# Patient Record
Sex: FEMALE | Race: BLACK OR AFRICAN AMERICAN | NOT HISPANIC OR LATINO | Employment: FULL TIME | URBAN - METROPOLITAN AREA
[De-identification: names, ages, dates, MRNs, and addresses within clinical notes are randomized per-mention and may not be internally consistent; named-entity substitution may affect disease eponyms.]

---

## 2017-03-01 ENCOUNTER — OFFICE VISIT (OUTPATIENT)
Dept: FAMILY MEDICINE CLINIC | Facility: CLINIC | Age: 47
End: 2017-03-01

## 2017-03-01 VITALS
HEIGHT: 68 IN | WEIGHT: 293 LBS | DIASTOLIC BLOOD PRESSURE: 92 MMHG | SYSTOLIC BLOOD PRESSURE: 130 MMHG | OXYGEN SATURATION: 96 % | HEART RATE: 93 BPM | BODY MASS INDEX: 44.41 KG/M2 | TEMPERATURE: 97.9 F

## 2017-03-01 DIAGNOSIS — E66.01 MORBID OBESITY DUE TO EXCESS CALORIES (HCC): ICD-10-CM

## 2017-03-01 DIAGNOSIS — I10 ESSENTIAL HYPERTENSION: Primary | ICD-10-CM

## 2017-03-01 DIAGNOSIS — R60.0 LOCALIZED EDEMA: ICD-10-CM

## 2017-03-01 DIAGNOSIS — N93.9 ABNORMAL UTERINE AND VAGINAL BLEEDING, UNSPECIFIED: ICD-10-CM

## 2017-03-01 PROCEDURE — 99213 OFFICE O/P EST LOW 20 MIN: CPT | Performed by: FAMILY MEDICINE

## 2017-03-01 RX ORDER — LOSARTAN POTASSIUM 50 MG/1
50 TABLET ORAL DAILY
Qty: 90 TABLET | Refills: 3 | Status: SHIPPED | OUTPATIENT
Start: 2017-03-01 | End: 2018-02-09 | Stop reason: SDUPTHER

## 2017-03-01 NOTE — PROGRESS NOTES
"Subjective   Davina Delgado is a 46 y.o. female.  Pt is here due to swelling in ankles. Swelling is present 3-4 weeks. Pt is waking up in middle of the night along with through the day with swelling ankles.     History of Present Illness aye ankle swelling 3-4 weeks.  Painful due to pressure in ankles.  No injury.  She is on hctz and norvasc 10 mg for over last 1 year. No cp no soa.  nno pnd.  No orthopnea onset after started calcium channel blocker.  Bilateral  No redness.      She has hx fibroids and having spotting intermenstral with some bleeding 2 weeks after period for 2-3 days.      The following portions of the patient's history were reviewed and updated as appropriate: allergies, current medications, past family history, past medical history, past social history, past surgical history and problem list.    Review of Systems   Constitutional: Negative.    HENT: Negative.    Eyes: Negative.    Respiratory: Negative.  Negative for apnea, cough, choking, chest tightness and shortness of breath.    Cardiovascular: Positive for leg swelling. Negative for chest pain and palpitations.   Gastrointestinal: Negative.    Endocrine: Negative.    Genitourinary: Negative.    Musculoskeletal: Negative.    Skin: Negative.    Allergic/Immunologic: Negative.    Neurological: Negative.    Hematological: Negative.    Psychiatric/Behavioral: Negative.    All other systems reviewed and are negative.      Objective     Vitals:    03/01/17 1053   BP: 130/92   Pulse: 93   Temp: 97.9 °F (36.6 °C)   SpO2: 96%   Weight: (!) 340 lb (154 kg)   Height: 68\" (172.7 cm)       Physical Exam   Constitutional: She is oriented to person, place, and time. She appears well-developed and well-nourished.   HENT:   Head: Normocephalic and atraumatic.   Eyes: EOM are normal. Pupils are equal, round, and reactive to light. Right eye exhibits no discharge. Left eye exhibits no discharge.   Neck: Normal range of motion. Neck supple.   Cardiovascular: " Normal rate, regular rhythm, normal heart sounds and intact distal pulses.    Trace 1+ pitting edema bilaterally   Pulmonary/Chest: Effort normal and breath sounds normal.   Abdominal: Soft. Bowel sounds are normal. She exhibits no mass. There is no tenderness.   Musculoskeletal: Normal range of motion.        Right shoulder: She exhibits no swelling.   Neurological: She is alert and oriented to person, place, and time. She has normal reflexes.   Skin: Skin is warm and dry. No cyanosis. Nails show no clubbing.   Psychiatric: She has a normal mood and affect. Her behavior is normal. Judgment and thought content normal.   Nursing note and vitals reviewed.      Assessment/Plan     Problem List Items Addressed This Visit        Cardiovascular and Mediastinum    Hypertension - Primary    Relevant Medications    losartan (COZAAR) 50 MG tablet       Digestive    Obesity       Other    Abnormal uterine and vaginal bleeding, unspecified    Relevant Orders    Ambulatory Referral to Gynecology    Localized edema    Relevant Medications    losartan (COZAAR) 50 MG tablet        We will discontinue Norvasc as this is likely what is causing her peripheral edema.  Continue hydrochlorothiazide.  Start her on losartan 50 mg daily recheck her blood pressure in 1-2 months.  We will refer her to gynecology for her abnormal vaginal bleeding.

## 2017-03-30 ENCOUNTER — OFFICE VISIT (OUTPATIENT)
Dept: OBSTETRICS AND GYNECOLOGY | Facility: CLINIC | Age: 47
End: 2017-03-30

## 2017-03-30 VITALS
SYSTOLIC BLOOD PRESSURE: 140 MMHG | WEIGHT: 293 LBS | HEIGHT: 68 IN | BODY MASS INDEX: 44.41 KG/M2 | DIASTOLIC BLOOD PRESSURE: 90 MMHG

## 2017-03-30 DIAGNOSIS — N93.9 ABNORMAL UTERINE AND VAGINAL BLEEDING, UNSPECIFIED: Primary | ICD-10-CM

## 2017-03-30 DIAGNOSIS — N92.1 MENORRHAGIA WITH IRREGULAR CYCLE: ICD-10-CM

## 2017-03-30 DIAGNOSIS — D25.1 INTRAMURAL LEIOMYOMA OF UTERUS: ICD-10-CM

## 2017-03-30 PROCEDURE — 99203 OFFICE O/P NEW LOW 30 MIN: CPT | Performed by: OBSTETRICS & GYNECOLOGY

## 2017-03-30 RX ORDER — HYDROCHLOROTHIAZIDE 12.5 MG/1
CAPSULE, GELATIN COATED ORAL
Refills: 3 | COMMUNITY
Start: 2017-03-06 | End: 2019-06-17 | Stop reason: SDUPTHER

## 2017-03-30 NOTE — PROGRESS NOTES
Subjective   Chief Complaint   Patient presents with   • Menstrual Problem     Pt. is here because she is having irregular periods.  She complains of having really heavy periods.      Davina Delgado is a 46 y.o. year old .  Patient's last menstrual period was 03/10/2017 (approximate).  She presents to be seen because of abnormal bleeding and heavy periods.     SEXUAL Hx:  She is not currently sexually active.  In the past year there has not been new sexual partners.    Condoms are not typically used.  She would not like to be screened for STD's at today's exam.  Current birth control method: tubal ligation.  She is happy with her current method of contraception and does not want to discuss alternative methods of contraception.  MENSTRUAL Hx:  Patient's last menstrual period was 03/10/2017 (approximate).  In the past 6 months her cycles have been irregular.  Her menstrual flow is excessive.   Each month on average there are roughly 2 day(s) of very heavy flow.    Intermenstrual bleeding is present.    Post-coital bleeding is absent.  Dysmenorrhea: is not affecting her activities of daily living  PMS: is not affecting her activities of daily living  Her cycles are a source of concern for her that she wishes to discuss today.  HEALTH Hx:  She exercises regularly:no (but is planning to start exercising more ).  She wears her seat belt:yes.  She has concerns about domestic violence: no.  OTHER COMPLAINTS:  Nothing else    OTHER THINGS SHE WANTS TO DISCUSS TODAY:  Nothing else    The following portions of the patient's history were reviewed and updated as appropriate:current medications, allergies, past medical history and past surgical history    Smoking status: Never Smoker                                                                 Smokeless status: Not on file                       Review of Systems  Review of Systems - History obtained from the patient  General ROS: positive for  - fatigue and weight  "gain  Psychological ROS: negative  ENT ROS: negative  Allergy and Immunology ROS: negative  Hematological and Lymphatic ROS: negative  Endocrine ROS: negative  Breast ROS: negative for breast lumps  Mammogram is current  Respiratory ROS: no cough, shortness of breath, or wheezing  Cardiovascular ROS: no chest pain or dyspnea on exertion  Gastrointestinal ROS: no abdominal pain, change in bowel habits, or black or bloody stools  Genito-Urinary ROS: no dysuria, trouble voiding, or hematuria  Musculoskeletal ROS: negative  Neurological ROS: no TIA or stroke symptoms  Dermatological ROS: negative        Objective   /90 (BP Location: Right arm, Patient Position: Sitting, Cuff Size: Large Adult)  Ht 68\" (172.7 cm)  Wt (!) 337 lb (153 kg)  LMP 03/10/2017 (Approximate)  Breastfeeding? No  BMI 51.24 kg/m2    General:  well developed; well nourished  no acute distress  obese - Body mass index is 51.24 kg/(m^2).   Skin:  No suspicious lesions seen   Thyroid: normal to inspection and palpation   Lungs:  breathing is unlabored  clear to auscultation bilaterally   Heart:  regular rate and rhythm, S1, S2 normal, no murmur, click, rub or gallop   Breasts:  Examined in supine position  Symmetric without masses or skin dimpling  Nipples normal without inversion, lesions or discharge  There are no palpable axillary nodes   Abdomen: soft, non-tender; no masses  no umbilical or inginual hernias are present  no hepato-splenomegaly   Pelvis: Clinical staff was present for exam  External genitalia:  normal appearance of the external genitalia including [place]th[place]'s and [place]'s glands.  :  urethral meatus normal; urethral hypermobility is absent.  Vaginal:  normal pink mucosa without prolapse or lesions.  Cervix:  normal appearance.  Uterus:  normal size, shape and consistency. anteverted;  Adnexa:  normal bimanual exam of the adnexa.  Rectal:  digital rectal exam not performed; anus visually normal appearing.     Lab Review "   No data reviewed    Imaging   No data reviewed          Assessment/Plan   Davina was seen today for menstrual problem.    Diagnoses and all orders for this visit:    Abnormal uterine and vaginal bleeding, unspecified  -     US Non-ob Transvaginal; Future    Menorrhagia with irregular cycle  -     US Non-ob Transvaginal; Future    Intramural leiomyoma of uterus  -     US Non-ob Transvaginal; Future       Return after US      This note was electronically signed.    Chirag Poe MD   March 30, 2017

## 2017-05-22 ENCOUNTER — OFFICE VISIT (OUTPATIENT)
Dept: FAMILY MEDICINE CLINIC | Facility: CLINIC | Age: 47
End: 2017-05-22

## 2017-05-22 VITALS
HEIGHT: 68 IN | DIASTOLIC BLOOD PRESSURE: 88 MMHG | HEART RATE: 97 BPM | SYSTOLIC BLOOD PRESSURE: 118 MMHG | BODY MASS INDEX: 44.41 KG/M2 | WEIGHT: 293 LBS | OXYGEN SATURATION: 98 %

## 2017-05-22 DIAGNOSIS — I10 ESSENTIAL HYPERTENSION: Primary | ICD-10-CM

## 2017-05-22 PROCEDURE — 99213 OFFICE O/P EST LOW 20 MIN: CPT | Performed by: FAMILY MEDICINE

## 2017-07-21 ENCOUNTER — HOSPITAL ENCOUNTER (OUTPATIENT)
Dept: ULTRASOUND IMAGING | Facility: HOSPITAL | Age: 47
Discharge: HOME | End: 2017-07-21
Attending: OBSTETRICS & GYNECOLOGY | Admitting: OBSTETRICS & GYNECOLOGY

## 2017-07-21 DIAGNOSIS — N93.9 ABNORMAL UTERINE AND VAGINAL BLEEDING, UNSPECIFIED: ICD-10-CM

## 2017-07-21 DIAGNOSIS — N92.1 MENORRHAGIA WITH IRREGULAR CYCLE: ICD-10-CM

## 2017-07-21 DIAGNOSIS — D25.1 INTRAMURAL LEIOMYOMA OF UTERUS: ICD-10-CM

## 2017-07-21 PROCEDURE — 76830 TRANSVAGINAL US NON-OB: CPT

## 2017-08-01 ENCOUNTER — OFFICE VISIT (OUTPATIENT)
Dept: OBSTETRICS AND GYNECOLOGY | Facility: CLINIC | Age: 47
End: 2017-08-01

## 2017-08-01 VITALS
WEIGHT: 293 LBS | BODY MASS INDEX: 44.41 KG/M2 | TEMPERATURE: 98.6 F | HEIGHT: 68 IN | DIASTOLIC BLOOD PRESSURE: 84 MMHG | SYSTOLIC BLOOD PRESSURE: 130 MMHG

## 2017-08-01 DIAGNOSIS — N92.1 MENORRHAGIA WITH IRREGULAR CYCLE: ICD-10-CM

## 2017-08-01 DIAGNOSIS — N93.9 ABNORMAL UTERINE AND VAGINAL BLEEDING, UNSPECIFIED: ICD-10-CM

## 2017-08-01 DIAGNOSIS — D25.1 INTRAMURAL LEIOMYOMA OF UTERUS: Primary | ICD-10-CM

## 2017-08-01 PROCEDURE — 99212 OFFICE O/P EST SF 10 MIN: CPT | Performed by: OBSTETRICS & GYNECOLOGY

## 2017-08-01 NOTE — PROGRESS NOTES
Subjective   Davina Delgado is a 47 y.o. female.     History of Present Illness  A transvaginal ultrasound shows multiple uterine leiomyomata.  Patient is continued to have abnormal bleeding and wants a D&C for endometrial sampling.  The following portions of the patient's history were reviewed and updated as appropriate: allergies, current medications, past family history, past medical history, past social history, past surgical history and problem list.    Review of Systems   Constitutional: Negative.    Respiratory: Negative.    Cardiovascular: Negative.    Genitourinary: Negative.        Objective   Physical Exam   Constitutional: She appears well-developed and well-nourished.   Nursing note and vitals reviewed.      Assessment/Plan   Davina was seen today for follow-up.    Diagnoses and all orders for this visit:    Intramural leiomyoma of uterus    Menorrhagia with irregular cycle  -     External Facility Surgical/Procedural Request    Abnormal uterine and vaginal bleeding, unspecified  -     External Facility Surgical/Procedural Request       Schedule hysteroscopic fractional D&C    Chirag Poe MD

## 2017-08-07 ENCOUNTER — PREP FOR SURGERY (OUTPATIENT)
Dept: OTHER | Facility: HOSPITAL | Age: 47
End: 2017-08-07

## 2017-08-07 DIAGNOSIS — N93.9 ABNORMAL UTERINE BLEEDING (AUB): Primary | ICD-10-CM

## 2017-08-14 DIAGNOSIS — I10 ESSENTIAL HYPERTENSION: ICD-10-CM

## 2017-08-14 RX ORDER — HYDROCHLOROTHIAZIDE 12.5 MG/1
CAPSULE, GELATIN COATED ORAL
Qty: 90 CAPSULE | Refills: 3 | Status: SHIPPED | OUTPATIENT
Start: 2017-08-14 | End: 2017-11-22 | Stop reason: SDUPTHER

## 2017-10-12 ENCOUNTER — CLINICAL SUPPORT (OUTPATIENT)
Dept: FAMILY MEDICINE CLINIC | Facility: CLINIC | Age: 47
End: 2017-10-12

## 2017-10-12 DIAGNOSIS — Z23 NEED FOR INFLUENZA VACCINE: ICD-10-CM

## 2017-10-12 PROCEDURE — 90471 IMMUNIZATION ADMIN: CPT | Performed by: FAMILY MEDICINE

## 2017-10-12 PROCEDURE — 90686 IIV4 VACC NO PRSV 0.5 ML IM: CPT | Performed by: FAMILY MEDICINE

## 2017-10-18 ENCOUNTER — TRANSCRIBE ORDERS (OUTPATIENT)
Dept: ADMINISTRATIVE | Facility: HOSPITAL | Age: 47
End: 2017-10-18

## 2017-10-18 DIAGNOSIS — Z12.31 VISIT FOR SCREENING MAMMOGRAM: Primary | ICD-10-CM

## 2017-10-23 ENCOUNTER — TELEPHONE (OUTPATIENT)
Dept: FAMILY MEDICINE CLINIC | Facility: CLINIC | Age: 47
End: 2017-10-23

## 2017-10-23 NOTE — TELEPHONE ENCOUNTER
mucinex (plain gueifenesin) 600 mg BID   LAURA CARLSON    ----- Message from Elizabet Holder MA sent at 10/20/2017 11:12 AM EDT -----  Regarding: FW: NEEDING SUGGESTIONS FOR RX  Contact: [phone]  Thong Mary on call    ----- Message -----     From: Malorie Chu     Sent: 10/20/2017   9:19 AM       To: Elizabet Holder MA  Subject: NEEDING SUGGESTIONS FOR RX                       PATIENT TAKES B/P MEDICATION AND HAS COLD.  WANTS TO KNOW WHAT KIND OF MEDICATION SHE CAN USE TO HELP BRING UP THE PHLEGM?

## 2017-11-22 ENCOUNTER — OFFICE VISIT (OUTPATIENT)
Dept: FAMILY MEDICINE CLINIC | Facility: CLINIC | Age: 47
End: 2017-11-22

## 2017-11-22 VITALS
WEIGHT: 293 LBS | HEIGHT: 68 IN | TEMPERATURE: 98.1 F | OXYGEN SATURATION: 100 % | SYSTOLIC BLOOD PRESSURE: 128 MMHG | HEART RATE: 71 BPM | BODY MASS INDEX: 44.41 KG/M2 | DIASTOLIC BLOOD PRESSURE: 82 MMHG

## 2017-11-22 DIAGNOSIS — E55.9 VITAMIN D DEFICIENCY: ICD-10-CM

## 2017-11-22 DIAGNOSIS — IMO0001 CLASS 3 OBESITY DUE TO EXCESS CALORIES WITHOUT SERIOUS COMORBIDITY WITH BODY MASS INDEX (BMI) OF 50.0 TO 59.9 IN ADULT: ICD-10-CM

## 2017-11-22 DIAGNOSIS — D50.9 IRON DEFICIENCY ANEMIA, UNSPECIFIED IRON DEFICIENCY ANEMIA TYPE: ICD-10-CM

## 2017-11-22 DIAGNOSIS — I10 ESSENTIAL HYPERTENSION: Primary | ICD-10-CM

## 2017-11-22 DIAGNOSIS — R31.9 HEMATURIA, UNSPECIFIED TYPE: ICD-10-CM

## 2017-11-22 PROBLEM — Z12.4 PAP SMEAR FOR CERVICAL CANCER SCREENING: Status: ACTIVE | Noted: 2017-11-22

## 2017-11-22 LAB
25(OH)D3 SERPL-MCNC: 36.7 NG/ML
ALBUMIN SERPL-MCNC: 4.2 G/DL (ref 3.2–4.8)
ALBUMIN/GLOB SERPL: 1.4 G/DL (ref 1.5–2.5)
ALP SERPL-CCNC: 97 U/L (ref 25–100)
ALT SERPL W P-5'-P-CCNC: 13 U/L (ref 7–40)
ANION GAP SERPL CALCULATED.3IONS-SCNC: 5 MMOL/L (ref 3–11)
ARTICHOKE IGE QN: 116 MG/DL (ref 0–130)
AST SERPL-CCNC: 16 U/L (ref 0–33)
BASOPHILS # BLD AUTO: 0.03 10*3/MM3 (ref 0–0.2)
BASOPHILS NFR BLD AUTO: 0.6 % (ref 0–1)
BILIRUB BLD-MCNC: NEGATIVE MG/DL
BILIRUB SERPL-MCNC: 0.3 MG/DL (ref 0.3–1.2)
BUN BLD-MCNC: 8 MG/DL (ref 9–23)
BUN/CREAT SERPL: 11.4 (ref 7–25)
CALCIUM SPEC-SCNC: 11.6 MG/DL (ref 8.7–10.4)
CHLORIDE SERPL-SCNC: 110 MMOL/L (ref 99–109)
CHOLEST SERPL-MCNC: 167 MG/DL (ref 0–200)
CLARITY, POC: CLEAR
CO2 SERPL-SCNC: 25 MMOL/L (ref 20–31)
COLOR UR: YELLOW
CREAT BLD-MCNC: 0.7 MG/DL (ref 0.6–1.3)
DEPRECATED RDW RBC AUTO: 47 FL (ref 37–54)
EOSINOPHIL # BLD AUTO: 0.15 10*3/MM3 (ref 0–0.3)
EOSINOPHIL NFR BLD AUTO: 2.8 % (ref 0–3)
ERYTHROCYTE [DISTWIDTH] IN BLOOD BY AUTOMATED COUNT: 15 % (ref 11.3–14.5)
EXPIRATION DATE: ABNORMAL
GFR SERPL CREATININE-BSD FRML MDRD: 108 ML/MIN/1.73
GLOBULIN UR ELPH-MCNC: 3.1 GM/DL
GLUCOSE BLD-MCNC: 103 MG/DL (ref 70–100)
GLUCOSE UR STRIP-MCNC: NEGATIVE MG/DL
HAV IGM SERPL QL IA: NORMAL
HBA1C MFR BLD: 5.1 % (ref 4.8–5.6)
HBV CORE IGM SERPL QL IA: NORMAL
HBV SURFACE AG SERPL QL IA: NORMAL
HCT VFR BLD AUTO: 36.1 % (ref 34.5–44)
HCV AB SER DONR QL: NORMAL
HDLC SERPL-MCNC: 39 MG/DL (ref 40–60)
HGB BLD-MCNC: 10.7 G/DL (ref 11.5–15.5)
HIV1+2 AB SER QL: NORMAL
IMM GRANULOCYTES # BLD: 0.01 10*3/MM3 (ref 0–0.03)
IMM GRANULOCYTES NFR BLD: 0.2 % (ref 0–0.6)
IRON 24H UR-MRATE: 42 MCG/DL (ref 50–175)
IRON SATN MFR SERPL: 14 % (ref 15–50)
KETONES UR QL: NEGATIVE
LEUKOCYTE EST, POC: NEGATIVE
LYMPHOCYTES # BLD AUTO: 1.92 10*3/MM3 (ref 0.6–4.8)
LYMPHOCYTES NFR BLD AUTO: 35.7 % (ref 24–44)
Lab: ABNORMAL
MCH RBC QN AUTO: 25.4 PG (ref 27–31)
MCHC RBC AUTO-ENTMCNC: 29.6 G/DL (ref 32–36)
MCV RBC AUTO: 85.7 FL (ref 80–99)
MONOCYTES # BLD AUTO: 0.55 10*3/MM3 (ref 0–1)
MONOCYTES NFR BLD AUTO: 10.2 % (ref 0–12)
NEUTROPHILS # BLD AUTO: 2.72 10*3/MM3 (ref 1.5–8.3)
NEUTROPHILS NFR BLD AUTO: 50.5 % (ref 41–71)
NITRITE UR-MCNC: NEGATIVE MG/ML
PH UR: 6 [PH] (ref 5–8)
PLATELET # BLD AUTO: 374 10*3/MM3 (ref 150–450)
PMV BLD AUTO: 9.9 FL (ref 6–12)
POTASSIUM BLD-SCNC: 4.3 MMOL/L (ref 3.5–5.5)
PROT SERPL-MCNC: 7.3 G/DL (ref 5.7–8.2)
PROT UR STRIP-MCNC: NEGATIVE MG/DL
RBC # BLD AUTO: 4.21 10*6/MM3 (ref 3.89–5.14)
RBC # UR STRIP: ABNORMAL /UL
SODIUM BLD-SCNC: 140 MMOL/L (ref 132–146)
SP GR UR: 1.02 (ref 1–1.03)
TIBC SERPL-MCNC: 305 MCG/DL (ref 250–450)
TRIGL SERPL-MCNC: 111 MG/DL (ref 0–150)
TSH SERPL DL<=0.05 MIU/L-ACNC: 1.5 MIU/ML (ref 0.35–5.35)
UROBILINOGEN UR QL: NORMAL
VIT B12 BLD-MCNC: 664 PG/ML (ref 211–911)
WBC NRBC COR # BLD: 5.38 10*3/MM3 (ref 3.5–10.8)

## 2017-11-22 PROCEDURE — 83036 HEMOGLOBIN GLYCOSYLATED A1C: CPT | Performed by: FAMILY MEDICINE

## 2017-11-22 PROCEDURE — 80074 ACUTE HEPATITIS PANEL: CPT | Performed by: FAMILY MEDICINE

## 2017-11-22 PROCEDURE — 84443 ASSAY THYROID STIM HORMONE: CPT | Performed by: FAMILY MEDICINE

## 2017-11-22 PROCEDURE — 83540 ASSAY OF IRON: CPT | Performed by: FAMILY MEDICINE

## 2017-11-22 PROCEDURE — 86592 SYPHILIS TEST NON-TREP QUAL: CPT | Performed by: FAMILY MEDICINE

## 2017-11-22 PROCEDURE — 80061 LIPID PANEL: CPT | Performed by: FAMILY MEDICINE

## 2017-11-22 PROCEDURE — 85025 COMPLETE CBC W/AUTO DIFF WBC: CPT | Performed by: FAMILY MEDICINE

## 2017-11-22 PROCEDURE — 87086 URINE CULTURE/COLONY COUNT: CPT | Performed by: FAMILY MEDICINE

## 2017-11-22 PROCEDURE — 83550 IRON BINDING TEST: CPT | Performed by: FAMILY MEDICINE

## 2017-11-22 PROCEDURE — 87147 CULTURE TYPE IMMUNOLOGIC: CPT | Performed by: FAMILY MEDICINE

## 2017-11-22 PROCEDURE — 82607 VITAMIN B-12: CPT | Performed by: FAMILY MEDICINE

## 2017-11-22 PROCEDURE — G0432 EIA HIV-1/HIV-2 SCREEN: HCPCS | Performed by: FAMILY MEDICINE

## 2017-11-22 PROCEDURE — 99214 OFFICE O/P EST MOD 30 MIN: CPT | Performed by: FAMILY MEDICINE

## 2017-11-22 PROCEDURE — 82306 VITAMIN D 25 HYDROXY: CPT | Performed by: FAMILY MEDICINE

## 2017-11-22 PROCEDURE — 80053 COMPREHEN METABOLIC PANEL: CPT | Performed by: FAMILY MEDICINE

## 2017-11-22 NOTE — PROGRESS NOTES
Subjective   Davina Delgado is a 47 y.o. female.     Pt is here for routine fu on htn and fasting labs.    Hypertension   This is a chronic problem. The current episode started more than 1 year ago. The problem has been gradually improving since onset. The problem is controlled. Pertinent negatives include no anxiety, blurred vision, chest pain, headaches, malaise/fatigue, neck pain, orthopnea, palpitations, peripheral edema, PND, shortness of breath or sweats. There are no associated agents to hypertension. Risk factors for coronary artery disease include dyslipidemia and sedentary lifestyle. Past treatments include angiotensin blockers and diuretics. The current treatment provides moderate improvement. There are no compliance problems.  There is no history of angina, kidney disease, CAD/MI, CVA, heart failure, left ventricular hypertrophy, PVD or retinopathy.      She has history of iron def anemia and would like iron levels checked today    She was seen dr Xiong gyn and had tv us, dx with fibroids and offered D&C for irreg menses.  Pt declined at this time.  She is having regular periods with some intermittent spotting.  Pap 2016.  Repeat in 3 years.      The following portions of the patient's history were reviewed and updated as appropriate: allergies, current medications, past family history, past medical history, past social history, past surgical history and problem list.    Review of Systems   Constitutional: Negative.  Negative for malaise/fatigue.   HENT: Negative.    Eyes: Negative.  Negative for blurred vision.   Respiratory: Negative.  Negative for shortness of breath.    Cardiovascular: Negative.  Negative for chest pain, palpitations, orthopnea and PND.   Gastrointestinal: Negative.    Endocrine: Negative.    Genitourinary: Positive for menstrual problem.   Musculoskeletal: Negative.  Negative for neck pain.   Skin: Negative.    Allergic/Immunologic: Negative.    Neurological: Negative.  Negative  "for headaches.   Hematological: Negative.    Psychiatric/Behavioral: Negative.    All other systems reviewed and are negative.      Objective     Vitals:    11/22/17 0816   BP: 128/82   Pulse: 71   Temp: 98.1 °F (36.7 °C)   SpO2: 100%   Weight: (!) 333 lb (151 kg)   Height: 68\" (172.7 cm)       Physical Exam   Constitutional: She is oriented to person, place, and time. She appears well-developed and well-nourished.   HENT:   Head: Normocephalic and atraumatic.   Eyes: EOM are normal. Pupils are equal, round, and reactive to light. Right eye exhibits no discharge. Left eye exhibits no discharge.   Neck: Normal range of motion. Neck supple. No thyromegaly present.   Cardiovascular: Normal rate, regular rhythm, normal heart sounds and intact distal pulses.    No murmur heard.  Pulmonary/Chest: Effort normal and breath sounds normal. No respiratory distress. She has no wheezes. She has no rales.   Abdominal: Soft. Bowel sounds are normal. She exhibits no mass. There is no tenderness.   Musculoskeletal: Normal range of motion.        Right shoulder: She exhibits no swelling.   Lymphadenopathy:     She has no cervical adenopathy.   Neurological: She is alert and oriented to person, place, and time. She has normal reflexes. She displays normal reflexes.   Skin: Skin is warm and dry. No rash noted. No cyanosis or erythema. Nails show no clubbing.   Psychiatric: She has a normal mood and affect. Her behavior is normal. Judgment and thought content normal.   Nursing note and vitals reviewed.      Assessment/Plan     Problem List Items Addressed This Visit        Cardiovascular and Mediastinum    Hypertension - Primary    Relevant Orders    CBC & Differential    TSH    Comprehensive Metabolic Panel    Hemoglobin A1c    Lipid Panel    Vitamin D 25 Hydroxy    Vitamin B12    POC Urinalysis Dipstick, Automated (Completed)    HIV-1 / O / 2 Ag / Antibody 4th Generation    Hepatitis Panel, Acute    RPR    CBC Auto Differential       " Digestive    Vitamin D deficiency    Obesity       Hematopoietic and Hemostatic    Iron deficiency anemia    Relevant Orders    Iron and TIBC       Other    Hematuria    Relevant Orders    Urine Culture - Urine, Urine, Clean Catch

## 2017-11-24 LAB
BACTERIA SPEC AEROBE CULT: ABNORMAL
BACTERIA SPEC AEROBE CULT: ABNORMAL
RPR SER QL: NORMAL
STREP GROUPING: ABNORMAL

## 2017-12-04 ENCOUNTER — HOSPITAL ENCOUNTER (OUTPATIENT)
Dept: MAMMOGRAPHY | Facility: HOSPITAL | Age: 47
Discharge: HOME | End: 2017-12-04
Attending: FAMILY MEDICINE | Admitting: FAMILY MEDICINE

## 2017-12-04 DIAGNOSIS — Z12.31 VISIT FOR SCREENING MAMMOGRAM: ICD-10-CM

## 2017-12-04 PROCEDURE — 77063 BREAST TOMOSYNTHESIS BI: CPT

## 2017-12-04 PROCEDURE — G0202 SCR MAMMO BI INCL CAD: HCPCS

## 2017-12-05 PROCEDURE — 77063 BREAST TOMOSYNTHESIS BI: CPT | Performed by: RADIOLOGY

## 2017-12-05 PROCEDURE — 77067 SCR MAMMO BI INCL CAD: CPT | Performed by: RADIOLOGY

## 2017-12-07 ENCOUNTER — TELEPHONE (OUTPATIENT)
Dept: FAMILY MEDICINE CLINIC | Facility: CLINIC | Age: 47
End: 2017-12-07

## 2017-12-07 NOTE — TELEPHONE ENCOUNTER
Called pt , per dr tomlinson recommend pt taking a women's daily vitamin with iron and to follow up with Dr macedo next week . Pt verbalized understanding .

## 2017-12-07 NOTE — TELEPHONE ENCOUNTER
Everything was stable except for mild iron deficient anemia. She should be taking a women's multivitamin with iron once a day. Dr. Troncoso can follow-up with the rest of the labs next week.

## 2018-01-03 ENCOUNTER — OFFICE VISIT (OUTPATIENT)
Dept: FAMILY MEDICINE CLINIC | Facility: CLINIC | Age: 48
End: 2018-01-03

## 2018-01-03 VITALS
WEIGHT: 293 LBS | TEMPERATURE: 98 F | SYSTOLIC BLOOD PRESSURE: 108 MMHG | HEART RATE: 88 BPM | BODY MASS INDEX: 44.41 KG/M2 | HEIGHT: 68 IN | DIASTOLIC BLOOD PRESSURE: 84 MMHG | OXYGEN SATURATION: 97 %

## 2018-01-03 DIAGNOSIS — R35.0 URINE FREQUENCY: Primary | ICD-10-CM

## 2018-01-03 DIAGNOSIS — M79.662 PAIN OF LEFT CALF: ICD-10-CM

## 2018-01-03 LAB
BILIRUB BLD-MCNC: NEGATIVE MG/DL
CLARITY, POC: CLEAR
COLOR UR: YELLOW
EXPIRATION DATE: ABNORMAL
GLUCOSE UR STRIP-MCNC: NEGATIVE MG/DL
KETONES UR QL: NEGATIVE
LEUKOCYTE EST, POC: ABNORMAL
Lab: ABNORMAL
NITRITE UR-MCNC: NEGATIVE MG/ML
PH UR: 5.5 [PH] (ref 5–8)
PROT UR STRIP-MCNC: NEGATIVE MG/DL
RBC # UR STRIP: ABNORMAL /UL
SP GR UR: 1.01 (ref 1–1.03)
UROBILINOGEN UR QL: NORMAL

## 2018-01-03 PROCEDURE — 99214 OFFICE O/P EST MOD 30 MIN: CPT | Performed by: FAMILY MEDICINE

## 2018-01-03 PROCEDURE — 87086 URINE CULTURE/COLONY COUNT: CPT | Performed by: FAMILY MEDICINE

## 2018-01-03 PROCEDURE — 87147 CULTURE TYPE IMMUNOLOGIC: CPT | Performed by: FAMILY MEDICINE

## 2018-01-03 RX ORDER — NITROFURANTOIN 25; 75 MG/1; MG/1
100 CAPSULE ORAL EVERY 12 HOURS SCHEDULED
Qty: 20 CAPSULE | Refills: 0 | Status: SHIPPED | OUTPATIENT
Start: 2018-01-03 | End: 2018-05-23

## 2018-01-03 NOTE — PROGRESS NOTES
"Subjective   Davina Delgado is a 47 y.o. female.     Pt is here due to tenderness on left inter lower calf area. Noticed couple days ago. Warm to touch. Some swelling. Pain worse when walking.    Pt complains of urinary frequency that started about 3 weeks along with low back pain.    History of Present Illness he reports 2 days of tenderness in her left medial distal calf.  She complains of some calf swelling and some warmth over the medial calf.  She denies any pain with flexion and dorsiflexion or plantarflexion of the foot.  No shortness of breath no chest pain.  No recent immobilization or recent travel.  No family or personal history of blood clots.    She also reports she's had some urinary frequency for the last 3 weeks some mild lower back pain.  No pain with urination no fevers chills no nausea vomiting no vaginal discharge no vaginal itching.    The following portions of the patient's history were reviewed and updated as appropriate: allergies, current medications, past family history, past medical history, past social history, past surgical history and problem list.       Review of Systems   Constitutional: Negative.  Negative for fatigue and fever.   HENT: Negative.    Eyes: Negative.    Respiratory: Negative.    Cardiovascular: Negative.    Gastrointestinal: Negative.    Endocrine: Negative.    Genitourinary: Positive for frequency and urgency.   Musculoskeletal: Positive for joint swelling. Negative for back pain and gait problem.   Skin: Negative.    Allergic/Immunologic: Negative.    Neurological: Negative.    Hematological: Negative.    Psychiatric/Behavioral: Negative.    All other systems reviewed and are negative.      Objective     Vitals:    01/03/18 1140   BP: 108/84   Pulse: 88   Temp: 98 °F (36.7 °C)   SpO2: 97%   Weight: (!) 153 kg (338 lb)   Height: 172.7 cm (68\")       Physical Exam   Constitutional: She is oriented to person, place, and time. She appears well-developed and " well-nourished.   HENT:   Head: Normocephalic and atraumatic.   Eyes: EOM are normal. Pupils are equal, round, and reactive to light. Right eye exhibits no discharge. Left eye exhibits no discharge.   Neck: Normal range of motion. Neck supple.   Cardiovascular: Normal rate, regular rhythm, normal heart sounds and intact distal pulses.    Pulmonary/Chest: Effort normal and breath sounds normal.   Abdominal: Soft. Bowel sounds are normal. She exhibits no mass. There is no tenderness.   Musculoskeletal: Normal range of motion. She exhibits edema. She exhibits no tenderness or deformity.        Right shoulder: She exhibits no swelling.   Left calf 44 cm  Right calf 42 cm.   Neg caputo's     Neurological: She is alert and oriented to person, place, and time. She has normal reflexes.   Skin: Skin is warm and dry. No rash noted. No cyanosis or erythema. No pallor. Nails show no clubbing.   Psychiatric: She has a normal mood and affect. Her behavior is normal. Judgment and thought content normal.   Vitals reviewed.      Assessment/Plan     Problem List Items Addressed This Visit        Nervous and Auditory    Pain of left calf    Relevant Orders    Duplex Venous Lower Extremity - Left CAR       Genitourinary    Urine frequency - Primary    Relevant Medications    nitrofurantoin, macrocrystal-monohydrate, (MACROBID) 100 MG capsule    Other Relevant Orders    POC Urinalysis Dipstick, Automated (Completed)    Urine Culture - Urine, Urine, Clean Catch

## 2018-01-05 LAB
BACTERIA SPEC AEROBE CULT: ABNORMAL
STREP GROUPING: ABNORMAL

## 2018-01-09 ENCOUNTER — TELEPHONE (OUTPATIENT)
Dept: FAMILY MEDICINE CLINIC | Facility: CLINIC | Age: 48
End: 2018-01-09

## 2018-01-09 NOTE — TELEPHONE ENCOUNTER
----- Message from Zeynep Tracey Rep sent at 1/9/2018  2:17 PM EST -----  Regarding: RESULTS OF XRAY OF LT LEG  Contact: [phone]  WOULD LIKE TO KNOW THE RESULTS OF XRAY ON LEFT LEG. HAD DONE AT [facility]. ALSO WANTS TO KNOW IF SHE NEEDS TO BE RECHECK AFTER UTI.

## 2018-01-10 ENCOUNTER — TELEPHONE (OUTPATIENT)
Dept: FAMILY MEDICINE CLINIC | Facility: CLINIC | Age: 48
End: 2018-01-10

## 2018-01-10 NOTE — TELEPHONE ENCOUNTER
----- Message from Zeynep Tracey sent at 1/9/2018  2:17 PM EST -----  Regarding: RESULTS OF XRAY OF LT LEG  Contact: [phone]  WOULD LIKE TO KNOW THE RESULTS OF XRAY ON LEFT LEG. HAD DONE AT [facility]. ALSO WANTS TO KNOW IF SHE NEEDS TO BE RECHECK AFTER UTI.    PHONE PT TO NOTIFIED OF NORMAL VENOUS DUPLEX WITH THE EXCEPTION VENOUS INSUFFICIENCY. NO NEED TO FU AFTER FINISHING ANTIBIOTIC FOR UTI AS CULTURE DID NOT GROW ANY BACTERIA. UNABLE TO REACH DUE TO NON WORKING NUMBER.

## 2018-01-16 ENCOUNTER — TELEPHONE (OUTPATIENT)
Dept: FAMILY MEDICINE CLINIC | Facility: CLINIC | Age: 48
End: 2018-01-16

## 2018-02-09 DIAGNOSIS — R60.0 LOCALIZED EDEMA: ICD-10-CM

## 2018-02-09 DIAGNOSIS — I10 ESSENTIAL HYPERTENSION: ICD-10-CM

## 2018-02-09 RX ORDER — LOSARTAN POTASSIUM 50 MG/1
TABLET ORAL
Qty: 90 TABLET | Refills: 0 | Status: SHIPPED | OUTPATIENT
Start: 2018-02-09 | End: 2018-05-06 | Stop reason: SDUPTHER

## 2018-05-06 DIAGNOSIS — I10 ESSENTIAL HYPERTENSION: ICD-10-CM

## 2018-05-06 DIAGNOSIS — R60.0 LOCALIZED EDEMA: ICD-10-CM

## 2018-05-06 RX ORDER — LOSARTAN POTASSIUM 50 MG/1
TABLET ORAL
Qty: 90 TABLET | Refills: 0 | Status: SHIPPED | OUTPATIENT
Start: 2018-05-06 | End: 2018-05-23 | Stop reason: DRUGHIGH

## 2018-05-23 ENCOUNTER — OFFICE VISIT (OUTPATIENT)
Dept: FAMILY MEDICINE CLINIC | Facility: CLINIC | Age: 48
End: 2018-05-23

## 2018-05-23 VITALS
HEART RATE: 96 BPM | DIASTOLIC BLOOD PRESSURE: 88 MMHG | WEIGHT: 293 LBS | OXYGEN SATURATION: 96 % | SYSTOLIC BLOOD PRESSURE: 110 MMHG | TEMPERATURE: 98.2 F | BODY MASS INDEX: 50.94 KG/M2

## 2018-05-23 DIAGNOSIS — R60.0 LOCALIZED EDEMA: ICD-10-CM

## 2018-05-23 DIAGNOSIS — I10 ESSENTIAL HYPERTENSION: Primary | ICD-10-CM

## 2018-05-23 PROCEDURE — 99213 OFFICE O/P EST LOW 20 MIN: CPT | Performed by: FAMILY MEDICINE

## 2018-05-23 RX ORDER — LOSARTAN POTASSIUM 25 MG/1
25 TABLET ORAL DAILY
Qty: 90 TABLET | Refills: 3 | Status: SHIPPED | OUTPATIENT
Start: 2018-05-23 | End: 2020-06-05

## 2018-05-23 NOTE — PROGRESS NOTES
Subjective   Davina Delgado is a 47 y.o. female.     Pt is here to fu on htn. Systolic been 90s-low 100s Diastolic 62-69.    History of Present Illness she has been checking her blood pressure at home and although she feels well she has noticed some low systolic blood pressures in the low 100s and some lower diastolic numbers and the mid 60s.  She denies any near syncope no chest pain or shortness of breath she has occasional headaches at the end of the days.  No blurred vision no weakness.  No additional swelling in her legs.  She has no other complaints.    The following portions of the patient's history were reviewed and updated as appropriate: allergies, current medications, past family history, past medical history, past social history, past surgical history and problem list.    Review of Systems   Constitutional: Negative.    HENT: Negative.    Eyes: Negative.    Respiratory: Negative.    Cardiovascular: Negative.    Gastrointestinal: Negative.    Endocrine: Negative.    Genitourinary: Negative.    Musculoskeletal: Negative.    Skin: Negative.    Allergic/Immunologic: Negative.    Neurological: Negative.    Hematological: Negative.    Psychiatric/Behavioral: Negative.    All other systems reviewed and are negative.      Objective     Vitals:    05/23/18 0809   BP: 110/88   Pulse: 96   Temp: 98.2 °F (36.8 °C)   SpO2: 96%   Weight: (!) 152 kg (335 lb)       Physical Exam   Constitutional: She is oriented to person, place, and time. She appears well-developed and well-nourished.   HENT:   Head: Normocephalic and atraumatic.   Eyes: EOM are normal. Pupils are equal, round, and reactive to light. Right eye exhibits no discharge. Left eye exhibits no discharge.   Neck: Normal range of motion. Neck supple.   Cardiovascular: Normal rate, regular rhythm, normal heart sounds and intact distal pulses.    Pulmonary/Chest: Effort normal and breath sounds normal.   Abdominal: Soft. Bowel sounds are normal. She exhibits  no mass. There is no tenderness.   Musculoskeletal: Normal range of motion.        Right shoulder: She exhibits no swelling.   Neurological: She is alert and oriented to person, place, and time. She has normal reflexes.   Skin: Skin is warm and dry. No cyanosis. Nails show no clubbing.   Psychiatric: She has a normal mood and affect. Her behavior is normal. Judgment and thought content normal.       Assessment/Plan     Problem List Items Addressed This Visit        Cardiovascular and Mediastinum    Hypertension - Primary    Relevant Medications    losartan (COZAAR) 25 MG tablet       Other    Localized edema        I have adjusted her losartan from 50 mg daily to 25 mg daily.  I've asked her to bring her blood pressure cuff into the office to calibrate her cuff.  We discussed the goal blood pressure.  Follow-up in 3 months keep a blood pressure log in the meantime.  If her blood pressure readings are greater than 140/90 she should increase her losartan back to 50 mg.

## 2018-07-07 ENCOUNTER — APPOINTMENT (OUTPATIENT)
Dept: GENERAL RADIOLOGY | Facility: HOSPITAL | Age: 48
End: 2018-07-07

## 2018-07-07 ENCOUNTER — HOSPITAL ENCOUNTER (EMERGENCY)
Facility: HOSPITAL | Age: 48
Discharge: HOME | End: 2018-07-07
Attending: EMERGENCY MEDICINE | Admitting: EMERGENCY MEDICINE

## 2018-07-07 VITALS
OXYGEN SATURATION: 96 % | SYSTOLIC BLOOD PRESSURE: 168 MMHG | RESPIRATION RATE: 18 BRPM | HEIGHT: 68 IN | DIASTOLIC BLOOD PRESSURE: 73 MMHG | BODY MASS INDEX: 44.41 KG/M2 | TEMPERATURE: 98.4 F | WEIGHT: 293 LBS | HEART RATE: 82 BPM

## 2018-07-07 DIAGNOSIS — M17.12 PRIMARY OSTEOARTHRITIS OF LEFT KNEE: Primary | ICD-10-CM

## 2018-07-07 PROCEDURE — 99283 EMERGENCY DEPT VISIT LOW MDM: CPT

## 2018-07-07 PROCEDURE — 73560 X-RAY EXAM OF KNEE 1 OR 2: CPT

## 2018-07-07 RX ORDER — IBUPROFEN 800 MG/1
800 TABLET ORAL
Qty: 15 TABLET | Refills: 0 | Status: SHIPPED | OUTPATIENT
Start: 2018-07-07 | End: 2022-02-24

## 2018-07-11 ENCOUNTER — OFFICE VISIT (OUTPATIENT)
Dept: ORTHOPEDIC SURGERY | Facility: CLINIC | Age: 48
End: 2018-07-11

## 2018-07-11 VITALS — HEART RATE: 94 BPM | BODY MASS INDEX: 44.41 KG/M2 | OXYGEN SATURATION: 99 % | HEIGHT: 68 IN | WEIGHT: 293 LBS

## 2018-07-11 DIAGNOSIS — M17.12 PRIMARY LOCALIZED OSTEOARTHROSIS OF LEFT LOWER LEG: Primary | ICD-10-CM

## 2018-07-11 PROCEDURE — 20610 DRAIN/INJ JOINT/BURSA W/O US: CPT | Performed by: ORTHOPAEDIC SURGERY

## 2018-07-11 PROCEDURE — 99204 OFFICE O/P NEW MOD 45 MIN: CPT | Performed by: ORTHOPAEDIC SURGERY

## 2018-07-11 RX ORDER — BETAMETHASONE SODIUM PHOSPHATE AND BETAMETHASONE ACETATE 3; 3 MG/ML; MG/ML
6 INJECTION, SUSPENSION INTRA-ARTICULAR; INTRALESIONAL; INTRAMUSCULAR; SOFT TISSUE
Status: COMPLETED | OUTPATIENT
Start: 2018-07-11 | End: 2018-07-11

## 2018-07-11 RX ORDER — LIDOCAINE HYDROCHLORIDE 10 MG/ML
4 INJECTION, SOLUTION INFILTRATION; PERINEURAL
Status: COMPLETED | OUTPATIENT
Start: 2018-07-11 | End: 2018-07-11

## 2018-07-11 RX ORDER — BUPIVACAINE HYDROCHLORIDE 2.5 MG/ML
4 INJECTION, SOLUTION INFILTRATION; PERINEURAL
Status: COMPLETED | OUTPATIENT
Start: 2018-07-11 | End: 2018-07-11

## 2018-07-11 RX ADMIN — BUPIVACAINE HYDROCHLORIDE 4 ML: 2.5 INJECTION, SOLUTION INFILTRATION; PERINEURAL at 13:09

## 2018-07-11 RX ADMIN — LIDOCAINE HYDROCHLORIDE 4 ML: 10 INJECTION, SOLUTION INFILTRATION; PERINEURAL at 13:09

## 2018-07-11 RX ADMIN — BETAMETHASONE SODIUM PHOSPHATE AND BETAMETHASONE ACETATE 6 MG: 3; 3 INJECTION, SUSPENSION INTRA-ARTICULAR; INTRALESIONAL; INTRAMUSCULAR; SOFT TISSUE at 13:09

## 2018-07-11 NOTE — PROGRESS NOTES
[facility] [place] Note    Subjective     Chief Complaint   Patient presents with   • Left Knee - Pain        HPI      Davina Delgado is a 48 y.o. female. She complains of left knee pain.  She's had a for 6 months.  She takes 800 mg ibuprofen.  Pain is burning.  It is worse with walking and better with rest.        Past Medical History:   Diagnosis Date   • Hypertension    • Obesity    • Vitamin D deficiency       Past Surgical History:   Procedure Laterality Date   • NO PAST SURGERIES     • TUBAL ABDOMINAL LIGATION        Family History   Problem Relation Age of Onset   • Colon polyps Mother    • Colon polyps Father    • No Known Problems Sister    • No Known Problems Brother    • No Known Problems Daughter    • No Known Problems Son    • No Known Problems Maternal Grandmother    • No Known Problems Paternal Grandmother    • No Known Problems Maternal Aunt    • No Known Problems Paternal Aunt    • BRCA 1/2 Neg Hx    • Breast cancer Neg Hx    • Colon cancer Neg Hx    • Endometrial cancer Neg Hx    • Ovarian cancer Neg Hx      Social History     Social History   • Marital status:      Spouse name: N/A   • Number of children: N/A   • Years of education: N/A     Occupational History   • Not on file.     Social History Main Topics   • Smoking status: Never Smoker   • Smokeless tobacco: Never Used   • Alcohol use No   • Drug use: No   • Sexual activity: No     Other Topics Concern   • Not on file     Social History Narrative    PT IS  AND DOES NOT WORK.      Current Outpatient Prescriptions on File Prior to Visit   Medication Sig Dispense Refill   • aspirin 81 MG EC tablet Take 81 mg by mouth Daily.     • hydrochlorothiazide (MICROZIDE) 12.5 MG capsule TAKE ONE CAPSULE BY MOUTH EVERY DAY  3   • ibuprofen (ADVIL,MOTRIN) 800 MG tablet Take 1 tablet by mouth 3 (Three) Times a Day With Meals. 15 tablet 0   • losartan (COZAAR) 25 MG tablet Take 1 tablet by mouth Daily. 90 tablet 3   •  "MULTIPLE VITAMINS ESSENTIAL PO Take  by mouth.     • vitamin D (ERGOCALCIFEROL) 45792 UNITS capsule capsule Take  by mouth.       No current facility-administered medications on file prior to visit.       No Known Allergies     The following portions of the patient's history were reviewed and updated as appropriate: allergies, current medications, past family history, past medical history, past social history, past surgical history and problem list.    Review of Systems   Constitutional: Negative.    HENT: Negative.    Eyes: Negative.    Respiratory: Negative.    Cardiovascular: Negative.    Gastrointestinal: Negative.    Endocrine: Negative.    Genitourinary: Negative.    Musculoskeletal: Positive for arthralgias (Left knee pain).   Skin: Negative.    Allergic/Immunologic: Negative.    Neurological: Negative.    Hematological: Negative.    Psychiatric/Behavioral: Negative.         Objective      Physical Exam  Pulse 94   Ht 173 cm (68.11\")   Wt (!) 154 kg (339 lb 11.7 oz)   SpO2 99%   BMI 51.49 kg/m²     Body mass index is 51.49 kg/m².        GENERAL APPEARANCE: awake, alert & oriented x 3, in no acute distress and well developed, well nourished  PSYCH: normal mood and affect  LUNGS:  breathing nonlabored, no wheezing  EYES: sclera anicteric, pupils equal  CARDIOVASCULAR: palpable pulses dorsalis pedis, palpable posterior tibial bilaterally. Capillary refill less than 2 seconds  INTEGUMENTARY: skin intact, no clubbing, cyanosis  NEUROLOGIC:  Normal Sensation and reflexes             Ortho Exam  Peripheral Vascular:    Upper Extremity:   Inspection:  Left--no cyanotic nail beds Right--no cyanotic nail beds   Bilateral:  Pink nail beds with brisk capillary refill   Palpation:  Bilateral radial pulse normal    Musculoskeletal:  Global Assessment:  Overall assessment of Lower Extremity Muscle Strength and Tone:  Left quadriceps--5/5   Left hamstrings--5/5       Left tibialis anterior--5/5  Left " gastroc-soleus--5/5  Left EHL --5/5    Lower Extremity:  Knee/Patella:  No digital clubbing or cyanosis.    Examination of left knee reveals:  Normal deep tendon reflexes, coordination, strength, tone, sensation.  No known fractures or deformities.    Inspection and Palpation:  Left knee:  Tenderness:  Over the medial joint line and moderate severity  Effusion:  none  Crepitus:  Positive  Pulses:  2+  Ecchymosis:  None  Warmth:  None     ROM:  Right:  Extension:5    Flexion:120  Left:  Extension:5     Flexion:120    Instability:    Left:  Lachman Test:  Negative, Varus stress test negative, Valgus stress test negative    Deformities/Malalignments/Discrepancies:    Left:  Genu Varum   Right:  No deformity    Functional Testing:  Kenia's test:  Negative  Patella grind test:  Positive  Q-angle:  normal    Imaging/Studies  Imaging Results (last 7 days)     ** No results found for the last 168 hours. **        I reviewed the x-rays which show medial compartment arthritis.  Assessment/Plan        ICD-10-CM ICD-9-CM   1. Primary localized osteoarthrosis of left lower leg M17.12 715.16       Orders Placed This Encounter   Procedures   • Large Joint Arthrocentesis        I injected the left knee with cortisone.  She tolerated the injection well.  She will follow-up as needed when the pain returns.    Medical Decision Making  Management Options : prescription/IM medicine  Data/Risk: radiology tests and independent visualization of imaging, lab tests, or EMG/NCV    Samir High MD  07/11/18  1:19 PM         EMR Dragon/Transcription disclaimer:  Much of this encounter note is an electronic transcription of spoken language to printed text. Electronic transcription of spoken language may permit erroneous, or at times, nonsensical words or phrases to be inadvertently transcribed. Although I have reviewed the note for such errors, some may still exist.

## 2018-07-11 NOTE — ED PROVIDER NOTES
Subjective   Libby Delgado is a 48-year-old female that presents emergency Department with complaints of left knee pain.  Patient reports she's had pain off and on to her left knee.  Patient reports gotten worse today.  Patient denies any injury.  Patient denies any numbness, tingling.  Patient complains of increasing pain with walking.        History provided by:  Patient  Knee Pain   Location:  Knee  Knee location:  L knee  Pain details:     Quality:  Throbbing    Radiates to:  Does not radiate    Severity:  Mild    Timing:  Constant    Progression:  Worsening  Worsened by:  Bearing weight and activity  Ineffective treatments:  None tried  Associated symptoms: swelling    Associated symptoms: no back pain, no decreased ROM, no numbness and no tingling    Risk factors: obesity        Review of Systems   Musculoskeletal: Negative for back pain.        Left knee pain   All other systems reviewed and are negative.      Past Medical History:   Diagnosis Date   • Hypertension    • Obesity    • Vitamin D deficiency        No Known Allergies    Past Surgical History:   Procedure Laterality Date   • NO PAST SURGERIES     • TUBAL ABDOMINAL LIGATION         Family History   Problem Relation Age of Onset   • Colon polyps Mother    • Colon polyps Father    • No Known Problems Sister    • No Known Problems Brother    • No Known Problems Daughter    • No Known Problems Son    • No Known Problems Maternal Grandmother    • No Known Problems Paternal Grandmother    • No Known Problems Maternal Aunt    • No Known Problems Paternal Aunt    • BRCA 1/2 Neg Hx    • Breast cancer Neg Hx    • Colon cancer Neg Hx    • Endometrial cancer Neg Hx    • Ovarian cancer Neg Hx        Social History     Social History   • Marital status:      Social History Main Topics   • Smoking status: Never Smoker   • Smokeless tobacco: Never Used   • Alcohol use No   • Drug use: No   • Sexual activity: No     Other Topics Concern   • Not on file      Social History Narrative    PT IS  AND DOES NOT WORK.           Objective   Physical Exam   Constitutional: She is oriented to person, place, and time. She appears well-developed and well-nourished. No distress.   HENT:   Head: Normocephalic and atraumatic.   Eyes: EOM are normal.   Neck: Normal range of motion.   Cardiovascular: Normal rate, regular rhythm and intact distal pulses.    Pulmonary/Chest: Effort normal and breath sounds normal. No respiratory distress.   Musculoskeletal:        Left knee: She exhibits swelling. She exhibits normal range of motion and no deformity. Tenderness (anterior left knee) found.   No obvious signs and symptoms of ligamentous injury   Neurological: She is alert and oriented to person, place, and time.   Skin: Skin is warm.   Psychiatric: She has a normal mood and affect.   Nursing note and vitals reviewed.      Procedures           ED Course  ED Course as of Jul 11 0406   Wed Jul 11, 2018   0405 7/7 0400  patient is advise her results at this time.  Patient will be discharged home.  Patient to take a temperature as as discussed.  Patient to follow-up with OrthO as needed.  Patient agrees and verbalizes understanding.  [KG]      ED Course User Index  [KG] Summer LACI Pugh, APRN          No results found for this or any previous visit (from the past 24 hour(s)).  Note: In addition to lab results from this visit, the labs listed above may include labs taken at another facility or during a different encounter within the last 24 hours. Please correlate lab times with ED admission and discharge times for further clarification of the services performed during this visit.    XR Knee 1 or 2 View Left   Final Result     1. No acute osseous findings.     2. Mild tricompartmental osteoarthrosis.     3. Small knee joint effusion, a nonspecific finding.      THIS DOCUMENT HAS BEEN ELECTRONICALLY SIGNED BY RAYMUNDO ODOM MD        Vitals:    07/07/18 0057   BP: 168/73   BP Location:  "Left arm   Patient Position: Sitting   Pulse: 82   Resp: 18   Temp: 98.4 °F (36.9 °C)   TempSrc: Oral   SpO2: 96%   Weight: (!) 150 kg (330 lb)   Height: 172.7 cm (68\")     Medications - No data to display  ECG/EMG Results (last 24 hours)     ** No results found for the last 24 hours. **                [facility]      Final diagnoses:   Primary osteoarthritis of left knee            Summer Pugh, APRN  07/11/18 0406    "

## 2018-07-11 NOTE — PROGRESS NOTES
Procedure   Large Joint Arthrocentesis  Date/Time: 7/11/2018 1:09 PM  Consent given by: patient  Site marked: site marked  Timeout: Immediately prior to procedure a time out was called to verify the correct patient, procedure, equipment, support staff and site/side marked as required   Supporting Documentation  Indications: pain   Procedure Details  Location: knee - L knee  Preparation: Patient was prepped and draped in the usual sterile fashion  Needle size: 22 G  Approach: anterolateral  Medications administered: 4 mL lidocaine 1 %; 6 mg betamethasone acetate-betamethasone sodium phosphate 6 (3-3) MG/ML; 4 mL bupivacaine 0.25 %  Patient tolerance: patient tolerated the procedure well with no immediate complications

## 2018-07-12 DIAGNOSIS — I10 ESSENTIAL HYPERTENSION: ICD-10-CM

## 2018-07-17 RX ORDER — HYDROCHLOROTHIAZIDE 12.5 MG/1
CAPSULE, GELATIN COATED ORAL
Qty: 90 CAPSULE | Refills: 3 | Status: SHIPPED | OUTPATIENT
Start: 2018-07-17 | End: 2018-08-13 | Stop reason: SDUPTHER

## 2018-08-02 ENCOUNTER — TELEPHONE (OUTPATIENT)
Dept: ORTHOPEDIC SURGERY | Facility: CLINIC | Age: 48
End: 2018-08-02

## 2018-08-02 DIAGNOSIS — R60.0 LOCALIZED EDEMA: ICD-10-CM

## 2018-08-02 DIAGNOSIS — I10 ESSENTIAL HYPERTENSION: ICD-10-CM

## 2018-08-02 RX ORDER — LOSARTAN POTASSIUM 50 MG/1
TABLET ORAL
Qty: 90 TABLET | Refills: 0 | Status: SHIPPED | OUTPATIENT
Start: 2018-08-02 | End: 2018-08-13 | Stop reason: SDUPTHER

## 2018-08-02 NOTE — TELEPHONE ENCOUNTER
The patient stated that the pain that she is having in her left knee. She had to rub herself down in icy hot just to go to sleep. She also states that she does not want the cortisone injection again. I advised that I could get her an appointment with Dr. High to follow up for the pain. I set her up for an appointment on 08/17/18. She asked what is recommended for pain, I advised that OTC pain medication will work, such as Tylenol and Ibuprofen. I also recommended Aleve as well as alternating between heat and ice as well and rest as much as she can. She understood.

## 2018-08-02 NOTE — TELEPHONE ENCOUNTER
PT CALLING BECAUSE SHE HAD A CORTISONE INJ A COUPLE WEEKS AGO AND SHE SAYS HER KNEE IS STILL HURTING TERRIBLY. PLEASE CALL HER WITH SUGGESTIONS -[phone].

## 2018-08-13 ENCOUNTER — OFFICE VISIT (OUTPATIENT)
Dept: FAMILY MEDICINE CLINIC | Facility: CLINIC | Age: 48
End: 2018-08-13

## 2018-08-13 VITALS
DIASTOLIC BLOOD PRESSURE: 74 MMHG | BODY MASS INDEX: 44.41 KG/M2 | TEMPERATURE: 98.8 F | WEIGHT: 293 LBS | HEART RATE: 81 BPM | OXYGEN SATURATION: 98 % | HEIGHT: 68 IN | SYSTOLIC BLOOD PRESSURE: 128 MMHG

## 2018-08-13 DIAGNOSIS — I10 ESSENTIAL HYPERTENSION: Primary | ICD-10-CM

## 2018-08-13 PROCEDURE — 99213 OFFICE O/P EST LOW 20 MIN: CPT | Performed by: FAMILY MEDICINE

## 2018-08-13 NOTE — PROGRESS NOTES
"Erum Delgado is a 48 y.o. female.     Pt is here to fu on htn. Home systolic has been around low 120s and diastolic 69-70.    Hypertension   This is a chronic problem. The current episode started more than 1 year ago. The problem has been gradually improving since onset. The problem is controlled. Pertinent negatives include no anxiety, blurred vision, chest pain, peripheral edema or shortness of breath. There are no associated agents to hypertension. Risk factors for coronary artery disease include obesity, post-menopausal state and sedentary lifestyle. Current antihypertension treatment includes diuretics and angiotensin blockers. The current treatment provides moderate improvement. There are no compliance problems.  There is no history of angina, kidney disease or heart failure. There is no history of sleep apnea or a thyroid problem.        The following portions of the patient's history were reviewed and updated as appropriate: allergies, current medications, past family history, past medical history, past social history, past surgical history and problem list.    Review of Systems   Constitutional: Negative.    HENT: Negative.    Eyes: Negative.  Negative for blurred vision.   Respiratory: Negative.  Negative for shortness of breath.    Cardiovascular: Negative.  Negative for chest pain.   Gastrointestinal: Negative.    Endocrine: Negative.    Genitourinary: Negative.    Musculoskeletal: Negative.    Skin: Negative.    Allergic/Immunologic: Negative.    Neurological: Negative.    Hematological: Negative.    Psychiatric/Behavioral: Negative.    All other systems reviewed and are negative.      Objective     Vitals:    08/13/18 0812   BP: 128/74   Pulse: 81   Temp: 98.8 °F (37.1 °C)   SpO2: 98%   Weight: (!) 152 kg (336 lb)   Height: 172.7 cm (68\")       Physical Exam   Constitutional: She is oriented to person, place, and time. She appears well-developed and well-nourished.   HENT:   Head: " Normocephalic and atraumatic.   Eyes: Pupils are equal, round, and reactive to light. EOM are normal. Right eye exhibits no discharge. Left eye exhibits no discharge.   Neck: Normal range of motion. Neck supple.   Cardiovascular: Normal rate, regular rhythm, normal heart sounds and intact distal pulses.    Pulmonary/Chest: Effort normal and breath sounds normal.   Abdominal: Soft. Bowel sounds are normal. She exhibits no mass. There is no tenderness.   Musculoskeletal: Normal range of motion.        Right shoulder: She exhibits no swelling.   Neurological: She is alert and oriented to person, place, and time. She has normal reflexes.   Skin: Skin is warm and dry. No cyanosis. Nails show no clubbing.   Psychiatric: She has a normal mood and affect. Her behavior is normal. Judgment and thought content normal.   Nursing note and vitals reviewed.      Assessment/Plan     Problem List Items Addressed This Visit        Cardiovascular and Mediastinum    Hypertension - Primary    Current Assessment & Plan     Hypertension is improving with treatment.  Continue current treatment regimen.  Blood pressure will be reassessed at the next regular appointment.

## 2018-08-17 ENCOUNTER — OFFICE VISIT (OUTPATIENT)
Dept: ORTHOPEDIC SURGERY | Facility: CLINIC | Age: 48
End: 2018-08-17

## 2018-08-17 VITALS — HEIGHT: 68 IN | WEIGHT: 293 LBS | HEART RATE: 96 BPM | BODY MASS INDEX: 44.41 KG/M2 | OXYGEN SATURATION: 98 %

## 2018-08-17 DIAGNOSIS — IMO0001 CLASS 3 OBESITY DUE TO EXCESS CALORIES WITHOUT SERIOUS COMORBIDITY WITH BODY MASS INDEX (BMI) OF 50.0 TO 59.9 IN ADULT: ICD-10-CM

## 2018-08-17 DIAGNOSIS — M17.12 PRIMARY LOCALIZED OSTEOARTHROSIS OF LEFT LOWER LEG: Primary | ICD-10-CM

## 2018-08-17 PROCEDURE — 99213 OFFICE O/P EST LOW 20 MIN: CPT | Performed by: ORTHOPAEDIC SURGERY

## 2018-08-17 NOTE — PROGRESS NOTES
[facility] [place]    Subjective     Chief Complaint   Patient presents with   • Follow-up     5 weeks- Injection recheck -  Primary localized osteoarthrosis of left        HPI      Davina Delgado is a 48 y.o. female.  She is follow-up left knee.  She received a cortisone injection July.  It did not help.  She can pinpoint area of pain in her medial joint line.  The pain is 6 out of 10.        Past Medical History:   Diagnosis Date   • Hypertension    • Obesity    • Vitamin D deficiency       Past Surgical History:   Procedure Laterality Date   • NO PAST SURGERIES     • TUBAL ABDOMINAL LIGATION        Family History   Problem Relation Age of Onset   • Colon polyps Mother    • Colon polyps Father    • No Known Problems Sister    • No Known Problems Brother    • No Known Problems Daughter    • No Known Problems Son    • No Known Problems Maternal Grandmother    • No Known Problems Paternal Grandmother    • No Known Problems Maternal Aunt    • No Known Problems Paternal Aunt    • BRCA 1/2 Neg Hx    • Breast cancer Neg Hx    • Colon cancer Neg Hx    • Endometrial cancer Neg Hx    • Ovarian cancer Neg Hx      Social History     Social History   • Marital status:      Spouse name: N/A   • Number of children: N/A   • Years of education: N/A     Occupational History   • Not on file.     Social History Main Topics   • Smoking status: Never Smoker   • Smokeless tobacco: Never Used   • Alcohol use No   • Drug use: No   • Sexual activity: No     Other Topics Concern   • Not on file     Social History Narrative    PT IS  AND DOES NOT WORK.      Current Outpatient Prescriptions on File Prior to Visit   Medication Sig Dispense Refill   • aspirin 81 MG EC tablet Take 81 mg by mouth Daily.     • hydrochlorothiazide (MICROZIDE) 12.5 MG capsule TAKE ONE CAPSULE BY MOUTH EVERY DAY  3   • ibuprofen (ADVIL,MOTRIN) 800 MG tablet Take 1 tablet by mouth 3 (Three) Times a Day With Meals. 15 tablet 0  "  • losartan (COZAAR) 25 MG tablet Take 1 tablet by mouth Daily. 90 tablet 3   • MULTIPLE VITAMINS ESSENTIAL PO Take  by mouth.     • vitamin D (ERGOCALCIFEROL) 32390 UNITS capsule capsule Take  by mouth.       No current facility-administered medications on file prior to visit.       No Known Allergies     The following portions of the patient's history were reviewed and updated as appropriate: allergies, current medications, past family history, past medical history, past social history, past surgical history and problem list.    Review of Systems   Constitutional: Negative.    HENT: Negative.    Eyes: Negative.    Respiratory: Negative.    Cardiovascular: Negative.    Gastrointestinal: Negative.    Endocrine: Negative.    Genitourinary: Negative.    Musculoskeletal: Positive for arthralgias and gait problem.   Skin: Negative.    Allergic/Immunologic: Negative.    Hematological: Negative.    Psychiatric/Behavioral: Negative.         Objective      Physical Exam  Pulse 96   Ht 172.7 cm (67.99\")   Wt (!) 150 kg (330 lb 11 oz)   SpO2 98%   BMI 50.29 kg/m²     Body mass index is 50.29 kg/m².        GENERAL APPEARANCE: awake, alert & oriented x 3, in no acute distress and well developed, well nourished  PSYCH: normal mood and affect  LUNGS:  breathing nonlabored, no wheezing  EYES: sclera anicteric, pupils equal  CARDIOVASCULAR: palpable pulses dorsalis pedis, palpable posterior tibial bilaterally. Capillary refill less than 2 seconds  INTEGUMENTARY: skin intact, no clubbing, cyanosis  NEUROLOGIC:  Normal Sensation and reflexes             Ortho Exam  Peripheral Vascular:    Upper Extremity:   Inspection:  Left--no cyanotic nail beds Right--no cyanotic nail beds   Bilateral:  Pink nail beds with brisk capillary refill   Palpation:  Bilateral radial pulse normal    Musculoskeletal:  Global Assessment:  Overall assessment of Lower Extremity Muscle Strength and Tone:  Left quadriceps--5/5   Left hamstrings--5/5     "   Left tibialis anterior--5/5  Left gastroc-soleus--5/5  Left EHL --5/5    Lower Extremity:  Knee/Patella:  No digital clubbing or cyanosis.    Examination of left knee reveals:  Normal deep tendon reflexes, coordination, strength, tone, sensation.  No known fractures or deformities.    Inspection and Palpation:  Left knee:  Tenderness:  Over the medial joint line and moderate severity  Effusion:  none  Crepitus:  Positive  Pulses:  2+  Ecchymosis:  None  Warmth:  None     ROM:  Right:  Extension:5    Flexion:120  Left:  Extension:5     Flexion:120    Instability:    Left:  Lachman Test:  Negative, Varus stress test negative, Valgus stress test negative    Deformities/Malalignments/Discrepancies:    Left:  Genu Varum   Right:  No deformity    Functional Testing:  Kenia's test:  Negative  Patella grind test:  Positive  Q-angle:  normal    Imaging/Studies  Imaging Results (last 7 days)     ** No results found for the last 168 hours. **          Assessment/Plan        ICD-10-CM ICD-9-CM   1. Primary localized osteoarthrosis of left lower leg M17.12 715.16   2. Class 3 obesity due to excess calories without serious comorbidity with body mass index (BMI) of 50.0 to 59.9 in adult (CMS/[facility]) E66.09 278.00    Z68.43 V85.43       Orders Placed This Encounter   Procedures   • MRI Knee Left Without Contrast    We will get an MRI of the left knee.  She is not need a note for work resections.  She'll follow-up after the MRI.      Medical Decision Making  Management Options : over-the-counter medicine  Data/Risk: radiology tests    Samir High MD  08/17/18  8:20 AM         EMR Dragon/Transcription disclaimer:  Much of this encounter note is an electronic transcription of spoken language to printed text. Electronic transcription of spoken language may permit erroneous, or at times, nonsensical words or phrases to be inadvertently transcribed. Although I have reviewed the note for such errors, some may still exist.

## 2018-08-24 ENCOUNTER — HOSPITAL ENCOUNTER (OUTPATIENT)
Dept: MRI IMAGING | Facility: HOSPITAL | Age: 48
Discharge: HOME | End: 2018-08-24
Attending: ORTHOPAEDIC SURGERY | Admitting: ORTHOPAEDIC SURGERY

## 2018-08-24 DIAGNOSIS — M17.12 PRIMARY LOCALIZED OSTEOARTHROSIS OF LEFT LOWER LEG: ICD-10-CM

## 2018-08-24 PROCEDURE — 73721 MRI JNT OF LWR EXTRE W/O DYE: CPT

## 2018-08-31 ENCOUNTER — OFFICE VISIT (OUTPATIENT)
Dept: ORTHOPEDIC SURGERY | Facility: CLINIC | Age: 48
End: 2018-08-31

## 2018-08-31 VITALS — HEART RATE: 91 BPM | WEIGHT: 293 LBS | HEIGHT: 68 IN | OXYGEN SATURATION: 98 % | BODY MASS INDEX: 44.41 KG/M2

## 2018-08-31 DIAGNOSIS — M17.12 PRIMARY OSTEOARTHRITIS OF LEFT KNEE: ICD-10-CM

## 2018-08-31 DIAGNOSIS — IMO0001 CLASS 3 OBESITY DUE TO EXCESS CALORIES WITHOUT SERIOUS COMORBIDITY WITH BODY MASS INDEX (BMI) OF 50.0 TO 59.9 IN ADULT: Primary | ICD-10-CM

## 2018-08-31 DIAGNOSIS — S83.232D COMPLEX TEAR OF MEDIAL MENISCUS OF LEFT KNEE AS CURRENT INJURY, SUBSEQUENT ENCOUNTER: ICD-10-CM

## 2018-08-31 PROCEDURE — 99213 OFFICE O/P EST LOW 20 MIN: CPT | Performed by: ORTHOPAEDIC SURGERY

## 2018-09-06 ENCOUNTER — HOSPITAL ENCOUNTER (OUTPATIENT)
Dept: PHYSICAL THERAPY | Facility: HOSPITAL | Age: 48
Setting detail: THERAPIES SERIES
Discharge: HOME | End: 2018-09-06

## 2018-09-06 DIAGNOSIS — M17.12 PRIMARY OSTEOARTHRITIS OF LEFT KNEE: Primary | ICD-10-CM

## 2018-09-06 DIAGNOSIS — M25.562 CHRONIC PAIN OF LEFT KNEE: ICD-10-CM

## 2018-09-06 DIAGNOSIS — G89.29 CHRONIC PAIN OF LEFT KNEE: ICD-10-CM

## 2018-09-06 PROCEDURE — 97161 PT EVAL LOW COMPLEX 20 MIN: CPT

## 2018-09-06 NOTE — THERAPY EVALUATION
Outpatient Physical Therapy Ortho Initial Evaluation   [place]     Patient Name: Davina Delgado  : 1970  MRN: [id number]  Today's Date: 2018      Visit Date: 2018    Patient Active Problem List   Diagnosis   • Hypertension   • Vitamin D deficiency   • Obesity   • Physical exam, annual   • Hyperlipidemia   • Nonintractable episodic headache   • Abnormal uterine and vaginal bleeding, unspecified   • Localized edema   • Abnormal uterine bleeding (AUB)   • Pap smear for cervical cancer screening   • Iron deficiency anemia   • Hematuria   • Urine frequency   • Pain of left calf        Past Medical History:   Diagnosis Date   • Hypertension    • Obesity    • Vitamin D deficiency         Past Surgical History:   Procedure Laterality Date   • NO PAST SURGERIES     • TUBAL ABDOMINAL LIGATION         Visit Dx:     ICD-10-CM ICD-9-CM   1. Primary osteoarthritis of left knee M17.12 715.16   2. Chronic pain of left knee M25.562 719.46    G89.29 338.29           Patient History     Row Name 18 0801             History    Chief Complaint Pain  -AC      Type of Pain Knee pain  -AC      Date Current Problem(s) Began 18   6+ months ago  -AC      Brief Description of Current Complaint Pt noticed pain initially going up steps in L knee, felt a burning sensation in medial knee while lying in bed.  Pt states pain was so bad she had to go to the ER one night.  Aggravated by specific movements and going up steps.  Thinks she was too active trying to push through pain.    -AC      Previous treatment for THIS PROBLEM Injections  -AC      Patient/Caregiver Goals Relieve pain;Return to prior level of function  -AC      Patient's Rating of General Health Good  -AC      Occupation/sports/leisure activities Pt works as a caregiver for various people. Pt enjoys staying at home, cooking, taking care of  who is 82 years old.  Enjoys traveling, which can be an issue d/t knee pain.    -AC      Patient  seeing anyone else for problem(s)? Dr. High  -      How has patient tried to help current problem? Ice, mini squats, leg kicks  -AC      What clinical tests have you had for this problem? X-ray;MRI  -AC      Results of Clinical Tests Tricompartmental OA, small joint effusion, torn meniscus, mild MCL strain  -AC      Surgery/Hospitalization No  -AC      History of Previous Related Injuries Fell on R knee in '05 and displaced patella, did not feel pain until 1 year later.  No injuries to any other areas  -AC      Are you or can you be pregnant No  -AC         Pain     Pain Location Knee  -AC      Pain at Present 0  -AC      Pain at Best 0   Took 2 Aleve prior to evaluation  -AC      Pain at Worst 10  -AC      Pain Frequency Intermittent  -AC      Pain Description Burning;Aching   Stabbing when lying on mattress, aching going up steps  -AC      What Performance Factors Make the Current Problem(s) WORSE? Going up steps, lying on either side in bed, walking > 30-45 minutes continuously  -AC      What Performance Factors Make the Current Problem(s) BETTER? Getting off feet, elevating foot  -AC      Is your sleep disturbed? Yes   Initially  -AC      Is medication used to assist with sleep? Yes   Aleve, Tylenol Arthritis  -AC      Total hours of sleep per night 7-8  -AC      What position do you sleep in? Left sidelying;Right sidelying   With pillow between knees  -AC      Difficulties at work? Pushes through pain  -AC      Difficulties with ADL's? Using stairs in home  -AC         Fall Risk Assessment    Any falls in the past year: No  -AC         Daily Activities    Primary Language English  -AC      How does patient learn best? Reading;Listening;Demonstration  -AC      Barriers to learning Visual   Prescribed glasses  -AC      Pt Participated in POC and Goals Yes  -AC         Safety    Are you being hurt, hit, or frightened by anyone at home or in your life? No  -AC      Are you being neglected by a caregiver No  -AC         User Key  (r) = Recorded By, (t) = Taken By, (c) = Cosigned By    Initials Name Provider Type    AC Lisandra Benavides, PT Physical Therapist              PT Ortho     Row Name 09/06/18 0801       Posture/Observations    Posture- WNL Posture is WNL  -AC       Quarter Clearing    Quarter Clearing Lower Quarter Clearing  -AC       DTR- Lower Quarter Clearing    Patellar tendon (L2-4) Bilateral:;2- Normal response  -AC    Achilles tendon (S1-2) Bilateral:;2- Normal response  -AC       Sensory Screen for Light Touch- Lower Quarter Clearing    L1 (inguinal area) Bilateral:;Intact  -AC    L2 (anterior mid thigh) Bilateral:;Intact  -AC    L3 (distal anterior thigh) Bilateral:;Intact  -AC    L4 (medial lower leg/foot) Bilateral:;Intact  -AC    L5 (lateral lower leg/great toe) Bilateral:;Intact  -AC    S1 (bottom of foot) Bilateral:;Intact  -AC       Myotomal Screen- Lower Quarter Clearing    Hip flexion (L2) Right:;4 (Good);Left:;4+ (Good +)   Pain in L superior knee  -AC    Knee extension (L3) Right:;5 (Normal);Left:;4+ (Good +)  -AC    Ankle DF (L4) Bilateral:;5 (Normal)  -AC    Ankle PF (S1) Bilateral:;3- (Fair -)   Decreased ROM bilaterally  -AC    Knee flexion (S2) Right:;4+ (Good +);Left:;5 (Normal)   Pain in anterior left knee  -AC       Special Tests/Palpation    Special Tests/Palpation Knee  -AC       Knee Palpation    Knee Palpation? Yes   TTP distal MCL/pes anserine, patellar tendon insertion  -AC       Patellar Accessory Motions    Patellar Accessory Motions Tested? Yes   Decreased medial/lateral glide L patella  -AC       Knee (Tibiofemoral) Accessory Motions    Knee (Tibiofemoral) Accessory Motions Tested? Yes  -AC       Knee Special Tests    Anterior drawer (ACL lesion) Left:;Negative  -AC    Lachman’s (ACL lesion) Left:;Negative  -AC    Posterior drawer (PCL lesion) Left:;Negative  -AC    Valgus stress (MCL lesion) Left:;Positive   Pain at 30 deg flexion  -AC    Varus stress (LCL lesion)  Left:;Negative  -AC    Pivot shift test (ACL lesion) Left:;Positive   For crepitus only  -AC    Apley’s distraction test (meniscal lesion vs OA) Left:;Negative  -AC    Apley’s compression test (meniscal lesion vs OA) Left:;Negative  -AC    Thessaly test (meniscal lesion) Left:;Positive   At 30 degrees flexion  -AC    Patellar grind test (chondromalacia patella) Bilateral:;Negative  -AC       MMT (Manual Muscle Testing)    Rt Lower Ext Rt Hip Internal (Medial) Rotation;Rt Hip External (Lateral) Rotation;Rt Hip ABduction;Rt Hip Extension  -AC    Lt Lower Ext Lt Hip Internal (Medial) Rotation;Lt Hip External (Lateral) Rotation;Lt Hip ABduction;Lt Hip Extension  -AC       MMT Right Lower Ext    Rt Hip Extension MMT, Gross Movement (4+/5) good plus  -AC    Rt Hip ABduction MMT, Gross Movement (4/5) good  -AC    Rt Hip Internal (Medial) Rotation MMT, Gross Movement (4-/5) good minus  -AC    Rt Hip External (Lateral) Rotation MMT, Gross Movement (4+/5) good plus  -AC       MMT Left Lower Ext    Lt Hip Extension MMT, Gross Movement (4+/5) good plus  -AC    Lt Hip ABduction MMT, Gross Movement (4+/5) good plus  -AC    Lt Hip Internal (Medial) Rotation MMT, Gross Movement (5/5) normal  -AC    Lt Hip External (Lateral) Rotation MMT, Gross Movement (4+/5) good plus  -AC       Gait/Stairs Assessment/Training    Deviations/Abnormal Patterns (Gait) antalgic   Mildly decr'd step length with RLE  -AC      User Key  (r) = Recorded By, (t) = Taken By, (c) = Cosigned By    Initials Name Provider Type    Lisandra Montanez, PT Physical Therapist        Therapy Education  Education Details: Pt provided HEP including: standing hamstring stretch, standing gastroc/soleus stretches, heel raises, SLR, and PROM knee flexion.  Given: HEP  Program: New  How Provided: Demonstration, Written, Verbal  Provided to: Patient  Level of Understanding: Verbalized, Demonstrated           PT OP Goals     Row Name 09/06/18 0801          PT Short Term  Goals    STG Date to Achieve 09/27/18  -AC     STG 1 Decrease L knee pain by > or = 50%.  -AC     STG 1 Progress New  -AC     STG 2 Perform independent HEP to improve L knee strength/flexibility and manage pain.  -AC     STG 2 Progress New  -AC     STG 3 Enable ambulation up/down stairs with < or = 3/10 pain.  -AC     STG 3 Progress New  -AC        Long Term Goals    LTG Date to Achieve 10/18/18  -AC     LTG 1 Decrease L knee pain by > or = 75%.  -AC     LTG 1 Progress New  -AC     LTG 2 Improve LEFS score to > or = 65/80 to reflect significant improvement in use of LLE for ADLs/functional mobility.  -AC     LTG 2 Progress New  -AC     LTG 3 Enable pain-free ambulation up and down stairs.  -AC     LTG 3 Progress New  -AC        Time Calculation    PT Goal Re-Cert Due Date 12/05/18  -AC       User Key  (r) = Recorded By, (t) = Taken By, (c) = Cosigned By    Initials Name Provider Type    AC Lisandra Benavides, PT Physical Therapist              PT Assessment/Plan     Row Name 09/06/18 0801          PT Assessment    Functional Limitations Impaired gait;Limitations in community activities;Performance in leisure activities;Performance in work activities  -AC     Impairments Pain;Joint mobility;Muscle strength;Gait;Joint integrity  -AC     Assessment Comments Pt presents with signs and symptoms consistent with diagnosis. Pt presents with good overall strength but pain with specific stresses to L knee that limits pt's ability to ambulate stairs and perform work activities.  Pt would benefit from PT intervention to manage pain and improve pt's tolerance to activity.  -AC     Please refer to paper survey for additional self-reported information Yes  -AC     Rehab Potential Good  -AC     Patient/caregiver participated in establishment of treatment plan and goals Yes  -AC     Patient would benefit from skilled therapy intervention Yes  -AC        PT Plan    PT Frequency 1x/week  -AC     Predicted Duration of Therapy  Intervention (Therapy Eval) 12 visits  -AC     Planned CPT's? PT EVAL LOW COMPLEXITY: 18631;PT MANUAL THERAPY EA 15 MIN: 56886;PT GAIT TRAINING EA 15 MIN: 93459;PT ELECTRICAL STIM UNATTEND: ;PT RE-EVAL: 00799;PT THER ACT EA 15 MIN: 09142;PT NEUROMUSC RE-EDUCATION EA 15 MIN: 54647;PT SELF CARE/HOME MGMT/TRAIN EA 15: 06350;PT THER SUPP EA 15 MIN;PT THER MASS EA 15 MIN: 87114;PT HOT/COLD PACK WC NONMCARE: 17638;PT ULTRASOUND EA 15 MIN: 33824;PT IONTOPHORESIS EA 15 MIN: 09359  -AC     PT Plan Comments Progress LE strengthening/flexibility as tolerated. Manual therapy and modalities as needed.  -AC       User Key  (r) = Recorded By, (t) = Taken By, (c) = Cosigned By    Initials Name Provider Type    AC Lisandra Benavides, PT Physical Therapist        Outcome Measure Options: Lower Extremity Functional Scale (LEFS)  Lower Extremity Functional Index  Any of your usual work, housework or school activities: A little bit of difficulty  Your usual hobbies, recreational or sporting activities: A little bit of difficulty  Getting into or out of the bath: A little bit of difficulty  Walking between rooms: No difficulty  Putting on your shoes or socks: A little bit of difficulty  Squatting: Moderate difficulty  Lifting an object, like a bag of groceries from the floor: A little bit of difficulty  Performing light activities around your home: No difficulty  Performing heavy activities around your home: Moderate difficulty  Getting into or out of a car: Moderate difficulty  Walking 2 blocks: A little bit of difficulty  Walking a mile: A little bit of difficulty  Going up or down 10 stairs (about 1 flight of stairs): Quite a bit of difficulty  Standing for 1 hour: Moderate difficulty  Sitting for 1 hour: A little bit of difficulty  Running on even ground: Moderate difficulty  Running on uneven ground: Quite a bit of difficulty  Making sharp turns while running fast: Quite a bit of difficulty  Hopping: Moderate  difficulty  Rolling over in bed: A little bit of difficulty  Total: 50    Time Calculation:     Therapy Suggested Charges     Code   Minutes Charges    None             Start Time: 0801     Therapy Charges for Today     Code Description Service Date Service Provider Modifiers Qty    15349651509 HC PT EVAL LOW COMPLEXITY 4 9/6/2018 Lisandra Benavides, PT GP 1        PT G-Codes  Outcome Measure Options: Lower Extremity Functional Scale (LEFS)  Total: 50       Lisandra Benavides, PT  9/6/2018

## 2018-09-13 ENCOUNTER — HOSPITAL ENCOUNTER (OUTPATIENT)
Dept: PHYSICAL THERAPY | Facility: HOSPITAL | Age: 48
Setting detail: THERAPIES SERIES
Discharge: HOME | End: 2018-09-13

## 2018-09-13 DIAGNOSIS — M25.562 CHRONIC PAIN OF LEFT KNEE: ICD-10-CM

## 2018-09-13 DIAGNOSIS — G89.29 CHRONIC PAIN OF LEFT KNEE: ICD-10-CM

## 2018-09-13 DIAGNOSIS — M17.12 PRIMARY OSTEOARTHRITIS OF LEFT KNEE: Primary | ICD-10-CM

## 2018-09-13 PROCEDURE — 97110 THERAPEUTIC EXERCISES: CPT

## 2018-09-13 NOTE — THERAPY TREATMENT NOTE
Outpatient Physical Therapy Ortho Treatment Note  [facility]     Patient Name: Davina Delgado  : 1970  MRN: [id number]  Today's Date: 2018      Visit Date: 2018    Visit Dx:    ICD-10-CM ICD-9-CM   1. Primary osteoarthritis of left knee M17.12 715.16   2. Chronic pain of left knee M25.562 719.46    G89.29 338.29       Patient Active Problem List   Diagnosis   • Hypertension   • Vitamin D deficiency   • Obesity   • Physical exam, annual   • Hyperlipidemia   • Nonintractable episodic headache   • Abnormal uterine and vaginal bleeding, unspecified   • Localized edema   • Abnormal uterine bleeding (AUB)   • Pap smear for cervical cancer screening   • Iron deficiency anemia   • Hematuria   • Urine frequency   • Pain of left calf        Past Medical History:   Diagnosis Date   • Hypertension    • Obesity    • Vitamin D deficiency         Past Surgical History:   Procedure Laterality Date   • NO PAST SURGERIES     • TUBAL ABDOMINAL LIGATION             PT Assessment/Plan     Row Name 18 0759          PT Assessment    Assessment Comments Pt was able to tolerate progressions in quad/hamstring and gastroc/soleus strengthening this session, and reported elimination of pain at conclusion of session.  Pt states she is doing well with HEP as well.  -AC        PT Plan    PT Plan Comments Progress lower quarter strengthening as tolerated. Manual therapy and modalities for L knee as needed.  -AC       User Key  (r) = Recorded By, (t) = Taken By, (c) = Cosigned By    Initials Name Provider Type    AC Lisandra Benavides, PT Physical Therapist              Exercises     Row Name 18 0759             Subjective Comments    Subjective Comments Pt states she did well with her exercises, and is having some pain in her L knee today.  -AC         Subjective Pain    Able to rate subjective pain? yes  -AC      Pre-Treatment Pain Level 6  -AC      Post-Treatment Pain Level 0  -AC         Total Minutes     12177 - PT Therapeutic Exercise Minutes 30  -AC         Exercise 1    Exercise Name 1 Exercises performed in clinical setting as per flow sheet.  -AC      Time 1 30  -AC      Additional Comments Ther Ex  -AC        User Key  (r) = Recorded By, (t) = Taken By, (c) = Cosigned By    Initials Name Provider Type    AC Lisandra Benavides, PT Physical Therapist        Time Calculation:   Start Time: 0759  Therapy Suggested Charges     Code   Minutes Charges    51362 (CPT®) Hc Pt Neuromusc Re Education Ea 15 Min      71305 (CPT®) Hc Pt Ther Proc Ea 15 Min 30 2    06482 (CPT®) Hc Gait Training Ea 15 Min      01190 (CPT®) Hc Pt Therapeutic Act Ea 15 Min      50057 (CPT®) Hc Pt Manual Therapy Ea 15 Min      95286 (CPT®) Hc Pt Ther Massage- Per 15 Min      34680 (CPT®) Hc Pt Iontophoresis Ea 15 Min      07120 (CPT®) Hc Pt Elec Stim Ea-Per 15 Min      89821 (CPT®) Hc Pt Ultrasound Ea 15 Min      52269 (CPT®) Hc Pt Self Care/Mgmt/Train Ea 15 Min      85834 (CPT®) Hc Pt Prosthetic (S) Train Initial Encounter, Each 15 Min      82526 (CPT®) Hc Orthotic(S) Mgmt/Train Initial Encounter, Each 15min      49714 (CPT®) Hc Pt Aquatic Therapy Ea 15 Min      09241 (CPT®) Hc Pt Orthotic(S)/Prosthetic(S) Encounter, Each 15 Min      Total  30 2        Therapy Charges for Today     Code Description Service Date Service Provider Modifiers Qty    16943736767 HC PT THER PROC EA 15 MIN 9/13/2018 Lisandra Benavides, PT GP 2        Lisandra Benavides, PT  9/13/2018

## 2018-09-20 ENCOUNTER — HOSPITAL ENCOUNTER (OUTPATIENT)
Dept: PHYSICAL THERAPY | Facility: HOSPITAL | Age: 48
Setting detail: THERAPIES SERIES
Discharge: HOME | End: 2018-09-20

## 2018-09-20 ENCOUNTER — APPOINTMENT (OUTPATIENT)
Dept: NUTRITION | Facility: HOSPITAL | Age: 48
End: 2018-09-20

## 2018-09-20 DIAGNOSIS — M25.562 CHRONIC PAIN OF LEFT KNEE: ICD-10-CM

## 2018-09-20 DIAGNOSIS — G89.29 CHRONIC PAIN OF LEFT KNEE: ICD-10-CM

## 2018-09-20 DIAGNOSIS — M17.12 PRIMARY OSTEOARTHRITIS OF LEFT KNEE: Primary | ICD-10-CM

## 2018-09-20 PROCEDURE — 97110 THERAPEUTIC EXERCISES: CPT

## 2018-09-20 NOTE — THERAPY TREATMENT NOTE
Outpatient Physical Therapy Ortho Treatment Note  [facility]     Patient Name: Davina Delgado  : 1970  MRN: [id number]  Today's Date: 2018      Visit Date: 2018    Visit Dx:    ICD-10-CM ICD-9-CM   1. Primary osteoarthritis of left knee M17.12 715.16   2. Chronic pain of left knee M25.562 719.46    G89.29 338.29       Patient Active Problem List   Diagnosis   • Hypertension   • Vitamin D deficiency   • Obesity   • Physical exam, annual   • Hyperlipidemia   • Nonintractable episodic headache   • Abnormal uterine and vaginal bleeding, unspecified   • Localized edema   • Abnormal uterine bleeding (AUB)   • Pap smear for cervical cancer screening   • Iron deficiency anemia   • Hematuria   • Urine frequency   • Pain of left calf        Past Medical History:   Diagnosis Date   • Hypertension    • Obesity    • Vitamin D deficiency         Past Surgical History:   Procedure Laterality Date   • NO PAST SURGERIES     • TUBAL ABDOMINAL LIGATION             PT Assessment/Plan     Row Name 18 0755          PT Assessment    Assessment Comments Pt tolerated progressions in LE strengthening well, with mild c/o L knee pain with Bosu lunges.  However, pt did not report pain prior to or at conclusion of session.  -AC        PT Plan    PT Plan Comments Progress LE strengthening/flexibility as tolerated, with manual therapy and modalities as needed.  -AC       User Key  (r) = Recorded By, (t) = Taken By, (c) = Cosigned By    Initials Name Provider Type    Lisandra Montanez, PT Physical Therapist              Exercises     Row Name 18 0755             Subjective Comments    Subjective Comments Pt states her L knee has been hurting some, but she just pushes through it to get done what she needs to do.  However, she is not having any pain right now.  -AC         Subjective Pain    Able to rate subjective pain? yes  -AC      Pre-Treatment Pain Level 0  -AC      Post-Treatment Pain Level 0  -AC          Total Minutes    52876 - PT Therapeutic Exercise Minutes 30  -AC         Exercise 1    Exercise Name 1 Exercises performed in clinical setting as per flow sheet.  -AC      Time 1 30  -AC      Additional Comments Ther Ex  -AC        User Key  (r) = Recorded By, (t) = Taken By, (c) = Cosigned By    Initials Name Provider Type    AC Lisandra Benavides, PT Physical Therapist        Time Calculation:   Start Time: 0755  Therapy Suggested Charges     Code   Minutes Charges    42613 (CPT®) Hc Pt Neuromusc Re Education Ea 15 Min      63168 (CPT®) Hc Pt Ther Proc Ea 15 Min 30 2    75947 (CPT®) Hc Gait Training Ea 15 Min      82029 (CPT®) Hc Pt Therapeutic Act Ea 15 Min      89133 (CPT®) Hc Pt Manual Therapy Ea 15 Min      35827 (CPT®) Hc Pt Ther Massage- Per 15 Min      88872 (CPT®) Hc Pt Iontophoresis Ea 15 Min      52812 (CPT®) Hc Pt Elec Stim Ea-Per 15 Min      22438 (CPT®) Hc Pt Ultrasound Ea 15 Min      10168 (CPT®) Hc Pt Self Care/Mgmt/Train Ea 15 Min      46432 (CPT®) Hc Pt Prosthetic (S) Train Initial Encounter, Each 15 Min      28900 (CPT®) Hc Orthotic(S) Mgmt/Train Initial Encounter, Each 15min      53492 (CPT®) Hc Pt Aquatic Therapy Ea 15 Min      11931 (CPT®) Hc Pt Orthotic(S)/Prosthetic(S) Encounter, Each 15 Min      Total  30 2        Therapy Charges for Today     Code Description Service Date Service Provider Modifiers Qty    58200412524 HC PT THER PROC EA 15 MIN 9/20/2018 Lisandra Benavides, PT GP 2          Lisandra Benavides, PT  9/20/2018

## 2018-09-27 ENCOUNTER — HOSPITAL ENCOUNTER (OUTPATIENT)
Dept: PHYSICAL THERAPY | Facility: HOSPITAL | Age: 48
Setting detail: THERAPIES SERIES
Discharge: HOME | End: 2018-09-27

## 2018-09-27 DIAGNOSIS — M25.562 CHRONIC PAIN OF LEFT KNEE: ICD-10-CM

## 2018-09-27 DIAGNOSIS — M17.12 PRIMARY OSTEOARTHRITIS OF LEFT KNEE: Primary | ICD-10-CM

## 2018-09-27 DIAGNOSIS — G89.29 CHRONIC PAIN OF LEFT KNEE: ICD-10-CM

## 2018-09-27 PROCEDURE — 97010 HOT OR COLD PACKS THERAPY: CPT

## 2018-09-27 PROCEDURE — 97110 THERAPEUTIC EXERCISES: CPT

## 2018-09-27 NOTE — THERAPY TREATMENT NOTE
Outpatient Physical Therapy Ortho Treatment Note  [facility]     Patient Name: Davina Delgado  : 1970  MRN: [id number]  Today's Date: 2018      Visit Date: 2018    Visit Dx:    ICD-10-CM ICD-9-CM   1. Primary osteoarthritis of left knee M17.12 715.16   2. Chronic pain of left knee M25.562 719.46    G89.29 338.29       Patient Active Problem List   Diagnosis   • Hypertension   • Vitamin D deficiency   • Obesity   • Physical exam, annual   • Hyperlipidemia   • Nonintractable episodic headache   • Abnormal uterine and vaginal bleeding, unspecified   • Localized edema   • Abnormal uterine bleeding (AUB)   • Pap smear for cervical cancer screening   • Iron deficiency anemia   • Hematuria   • Urine frequency   • Pain of left calf        Past Medical History:   Diagnosis Date   • Hypertension    • Obesity    • Vitamin D deficiency         Past Surgical History:   Procedure Laterality Date   • NO PAST SURGERIES     • TUBAL ABDOMINAL LIGATION             PT Assessment/Plan     Row Name 18 0813          PT Assessment    Assessment Comments Pt had some c/o anterior knee pain with squatting this session, but otherwise is progressing with LE strengthening well.  Provided additional German ball exercises she can perform at home. Pt states she only has occasional medial L knee pain with ER of her hip, but is okay if she bends her knee in a straight plane. Pt agreed to ice application at conclusion of session since today was her first day not taking Tylenol prior to PT.  -AC        PT Plan    PT Plan Comments Progress LE ROM/strengthening exercises as tolerated. Manual therapy and modalities as needed.  -AC       User Key  (r) = Recorded By, (t) = Taken By, (c) = Cosigned By    Initials Name Provider Type    Lisandra Montanez, PT Physical Therapist              Modalities     Row Name 18 0813             Ice    Ice Applied Yes  -AC      Location L knee  -AC      Rx Minutes 15 mins  -AC       Ice S/P Rx Yes  -AC        User Key  (r) = Recorded By, (t) = Taken By, (c) = Cosigned By    Initials Name Provider Type    Lisandra Montanez, PT Physical Therapist               Exercises     Row Name 09/27/18 0813             Subjective Comments    Subjective Comments Pt states she thinks her knee is doing a little better.  Yesterday and today were the first days she has not needed to take Tylenol for pain.  -AC         Subjective Pain    Able to rate subjective pain? yes  -AC      Pre-Treatment Pain Level 0  -AC      Post-Treatment Pain Level 0  -AC         Total Minutes    20342 - PT Therapeutic Exercise Minutes 40  -AC         Exercise 1    Exercise Name 1 Exercises performed in clinical setting as per flow sheet.  -AC      Time 1 40  -AC      Additional Comments Ther Ex  -AC        User Key  (r) = Recorded By, (t) = Taken By, (c) = Cosigned By    Initials Name Provider Type    Lisandra Montanez, PT Physical Therapist           Manual Rx (last 36 hours)      Manual Treatments     Row Name 09/27/18 0813             Manual Rx 1    Manual Rx 1 Location L knee  -AC      Manual Rx 1 Type Provided brief patellofemoral glides in all directions to minimize pain after squats  -AC      Manual Rx 1 Grade I-II  -AC      Manual Rx 1 Duration 3 mins  -AC        User Key  (r) = Recorded By, (t) = Taken By, (c) = Cosigned By    Initials Name Provider Type    Lisandra Montanez, PT Physical Therapist        Time Calculation:   Start Time: 0813  Therapy Suggested Charges     Code   Minutes Charges    46871 (CPT®) Hc Pt Neuromusc Re Education Ea 15 Min      61321 (CPT®) Hc Pt Ther Proc Ea 15 Min 40 3    44652 (CPT®) Hc Gait Training Ea 15 Min      19053 (CPT®) Hc Pt Therapeutic Act Ea 15 Min      01492 (CPT®) Hc Pt Manual Therapy Ea 15 Min      89303 (CPT®) Hc Pt Ther Massage- Per 15 Min      76794 (CPT®) Hc Pt Iontophoresis Ea 15 Min      38690 (CPT®) Hc Pt Elec Stim Ea-Per 15 Min      25684 (CPT®) Hc Pt  Ultrasound Ea 15 Min      36576 (CPT®) Hc Pt Self Care/Mgmt/Train Ea 15 Min      80232 (CPT®) Hc Pt Prosthetic (S) Train Initial Encounter, Each 15 Min      72872 (CPT®) Hc Orthotic(S) Mgmt/Train Initial Encounter, Each 15min      12889 (CPT®) Hc Pt Aquatic Therapy Ea 15 Min      38470 (CPT®) Hc Pt Orthotic(S)/Prosthetic(S) Encounter, Each 15 Min      Total  40 3        Therapy Charges for Today     Code Description Service Date Service Provider Modifiers Qty    10252445656 HC PT THER PROC EA 15 MIN 9/27/2018 Lisandra Benavides, PT GP 3    61145787917 HC PT HOT/COLD PACK WC NONMCARE 9/27/2018 Lisandra Benavides, PT GP 1        Lisandra Benavides, PT  9/27/2018

## 2018-09-28 ENCOUNTER — OFFICE VISIT (OUTPATIENT)
Dept: ORTHOPEDIC SURGERY | Facility: CLINIC | Age: 48
End: 2018-09-28

## 2018-09-28 VITALS — HEART RATE: 98 BPM | OXYGEN SATURATION: 98 % | BODY MASS INDEX: 44.41 KG/M2 | WEIGHT: 293 LBS | HEIGHT: 68 IN

## 2018-09-28 DIAGNOSIS — M17.12 PRIMARY OSTEOARTHRITIS OF LEFT KNEE: ICD-10-CM

## 2018-09-28 DIAGNOSIS — S83.232D COMPLEX TEAR OF MEDIAL MENISCUS OF LEFT KNEE AS CURRENT INJURY, SUBSEQUENT ENCOUNTER: ICD-10-CM

## 2018-09-28 DIAGNOSIS — IMO0001 CLASS 3 OBESITY DUE TO EXCESS CALORIES WITHOUT SERIOUS COMORBIDITY WITH BODY MASS INDEX (BMI) OF 50.0 TO 59.9 IN ADULT: Primary | ICD-10-CM

## 2018-09-28 PROCEDURE — 99213 OFFICE O/P EST LOW 20 MIN: CPT | Performed by: ORTHOPAEDIC SURGERY

## 2018-09-28 NOTE — PROGRESS NOTES
[facility] [place] [facility] Note    Subjective     Chief Complaint   Patient presents with   • Follow-up     4 weeks- Complex tear of medial meniscus of left knee as current injury & Primary osteoarthritis of left knee        HPI      Davina Delgado is a 48 y.o. female.  She says she's doing much better.  She's working on weight loss.  She's lost 2 pounds.  She doing physical therapy.  She got some custom orthotics.  Her pain is 3 out of 10.        Past Medical History:   Diagnosis Date   • Hypertension    • Obesity    • Vitamin D deficiency       Past Surgical History:   Procedure Laterality Date   • NO PAST SURGERIES     • TUBAL ABDOMINAL LIGATION        Family History   Problem Relation Age of Onset   • Colon polyps Mother    • Colon polyps Father    • No Known Problems Sister    • No Known Problems Brother    • No Known Problems Daughter    • No Known Problems Son    • No Known Problems Maternal Grandmother    • No Known Problems Paternal Grandmother    • No Known Problems Maternal Aunt    • No Known Problems Paternal Aunt    • BRCA 1/2 Neg Hx    • Breast cancer Neg Hx    • Colon cancer Neg Hx    • Endometrial cancer Neg Hx    • Ovarian cancer Neg Hx      Social History     Social History   • Marital status:      Spouse name: N/A   • Number of children: N/A   • Years of education: N/A     Occupational History   • Not on file.     Social History Main Topics   • Smoking status: Never Smoker   • Smokeless tobacco: Never Used   • Alcohol use No   • Drug use: No   • Sexual activity: No     Other Topics Concern   • Not on file     Social History Narrative    PT IS  AND DOES NOT WORK.      Current Outpatient Prescriptions on File Prior to Visit   Medication Sig Dispense Refill   • aspirin 81 MG EC tablet Take 81 mg by mouth Daily.     • hydrochlorothiazide (MICROZIDE) 12.5 MG capsule TAKE ONE CAPSULE BY MOUTH EVERY DAY  3   • ibuprofen (ADVIL,MOTRIN) 800 MG tablet Take 1 tablet by mouth 3  "(Three) Times a Day With Meals. 15 tablet 0   • losartan (COZAAR) 25 MG tablet Take 1 tablet by mouth Daily. 90 tablet 3   • MULTIPLE VITAMINS ESSENTIAL PO Take  by mouth.     • vitamin D (ERGOCALCIFEROL) 21509 UNITS capsule capsule Take  by mouth.       No current facility-administered medications on file prior to visit.       No Known Allergies     The following portions of the patient's history were reviewed and updated as appropriate: allergies, current medications, past family history, past medical history, past social history, past surgical history and problem list.    Review of Systems   Constitutional: Negative.    HENT: Negative.    Eyes: Negative.    Respiratory: Negative.    Cardiovascular: Negative.    Gastrointestinal: Negative.    Endocrine: Negative.    Genitourinary: Negative.    Musculoskeletal: Positive for arthralgias.   Skin: Negative.    Allergic/Immunologic: Negative.    Neurological: Negative.    Hematological: Negative.    Psychiatric/Behavioral: Negative.         Objective      Physical Exam  Pulse 98   Ht 172.7 cm (67.99\")   Wt (!) 148 kg (326 lb 4.5 oz)   SpO2 98%   BMI 49.62 kg/m²     Body mass index is 49.62 kg/m².        GENERAL APPEARANCE: awake, alert & oriented x 3, in no acute distress and well developed, well nourished  PSYCH: normal mood and affect      Ortho Exam  Peripheral Vascular:    Upper Extremity:   Inspection:  Left--no cyanotic nail beds Right--no cyanotic nail beds   Bilateral:  Pink nail beds with brisk capillary refill   Palpation:  Bilateral radial pulse normal    Musculoskeletal:  Global Assessment:  Overall assessment of Lower Extremity Muscle Strength and Tone:  Left quadriceps--5/5   Left hamstrings--5/5       Left tibialis anterior--5/5  Left gastroc-soleus--5/5  Left EHL --5/5    Lower Extremity:  Knee/Patella:  No digital clubbing or cyanosis.    Examination of left knee reveals:  Normal deep tendon reflexes, coordination, strength, tone, sensation.  No " known fractures or deformities.    Inspection and Palpation:  Left knee:  Tenderness:  Over the medial joint line and moderate severity  Effusion:  none  Crepitus:  Positive  Pulses:  2+  Ecchymosis:  None  Warmth:  None     ROM:  Right:  Extension:5    Flexion:120  Left:  Extension:5     Flexion:120    Instability:    Left:  Lachman Test:  Negative, Varus stress test negative, Valgus stress test negative    Deformities/Malalignments/Discrepancies:    Left:  Genu Varum   Right:  No deformity    Functional Testing:  Kenia's test:  Negative  Patella grind test:  Positive  Q-angle:  normal    Imaging/Studies  Imaging Results (last 7 days)     ** No results found for the last 168 hours. **          Assessment/Plan        ICD-10-CM ICD-9-CM   1. Class 3 obesity due to excess calories without serious comorbidity with body mass index (BMI) of 50.0 to 59.9 in adult E66.09 278.00    Z68.43 V85.43   2. Complex tear of medial meniscus of left knee as current injury, subsequent encounter S83.232D V58.89   3. Primary osteoarthritis of left knee M17.12 715.16     She will continue physical therapy and advance activity as tolerated.  She'll follow-up as needed.  Medical Decision Making  Management Options : over-the-counter medicine and physical/occupational therapy      Samir High MD  09/28/18  8:07 AM         EMR Dragon/Transcription disclaimer:  Much of this encounter note is an electronic transcription of spoken language to printed text. Electronic transcription of spoken language may permit erroneous, or at times, nonsensical words or phrases to be inadvertently transcribed. Although I have reviewed the note for such errors, some may still exist.

## 2018-10-04 ENCOUNTER — HOSPITAL ENCOUNTER (OUTPATIENT)
Dept: PHYSICAL THERAPY | Facility: HOSPITAL | Age: 48
Setting detail: THERAPIES SERIES
Discharge: HOME | End: 2018-10-04

## 2018-10-04 DIAGNOSIS — M25.562 CHRONIC PAIN OF LEFT KNEE: ICD-10-CM

## 2018-10-04 DIAGNOSIS — G89.29 CHRONIC PAIN OF LEFT KNEE: ICD-10-CM

## 2018-10-04 DIAGNOSIS — M17.12 PRIMARY OSTEOARTHRITIS OF LEFT KNEE: Primary | ICD-10-CM

## 2018-10-04 PROCEDURE — 97110 THERAPEUTIC EXERCISES: CPT

## 2018-10-04 NOTE — THERAPY TREATMENT NOTE
Outpatient Physical Therapy Ortho Treatment Note   [place]     Patient Name: Davina Delgado  : 1970  MRN: [id number]  Today's Date: 10/4/2018      Visit Date: 10/04/2018    Visit Dx:    ICD-10-CM ICD-9-CM   1. Primary osteoarthritis of left knee M17.12 715.16   2. Chronic pain of left knee M25.562 719.46    G89.29 338.29       Patient Active Problem List   Diagnosis   • Hypertension   • Vitamin D deficiency   • Obesity   • Physical exam, annual   • Hyperlipidemia   • Nonintractable episodic headache   • Abnormal uterine and vaginal bleeding, unspecified   • Localized edema   • Abnormal uterine bleeding (AUB)   • Pap smear for cervical cancer screening   • Iron deficiency anemia   • Hematuria   • Urine frequency   • Pain of left calf        Past Medical History:   Diagnosis Date   • Hypertension    • Obesity    • Vitamin D deficiency         Past Surgical History:   Procedure Laterality Date   • NO PAST SURGERIES     • TUBAL ABDOMINAL LIGATION               PT Ortho     Row Name 10/04/18 0755       Subjective Pain    Post-Treatment Pain Level 0  -AC       Myotomal Screen- Lower Quarter Clearing    Hip flexion (L2) Right:;5 (Normal);Left:;4+ (Good +)  -AC    Knee extension (L3) Bilateral:;5 (Normal)  -AC    Ankle DF (L4) Bilateral:;5 (Normal)  -AC    Knee flexion (S2) Bilateral:;5 (Normal)   L: pain in anterior knee but better than before  -AC       Special Tests/Palpation    Special Tests/Palpation Knee  -AC       Knee Palpation    Knee Palpation? Yes   Improved TTP to medial distal MCL/pes anserine; TTP mid-calf  -AC      User Key  (r) = Recorded By, (t) = Taken By, (c) = Cosigned By    Initials Name Provider Type    AC Lisandra Benavides, PT Physical Therapist              PT Assessment/Plan     Row Name 10/04/18 0755          PT Assessment    Functional Limitations Performance in self-care ADL;Limitation in home management;Impaired locomotion  -AC     Impairments Pain;Joint integrity;Muscle  strength;Locomotion  -AC     Assessment Comments Pt reports significant improvement in L knee pain (80-89%) and feels exercises in clinic/at home have been helping.  Pt has improved tenderness to palpation to medial knee, however notes pain with going up steps in L mid-calf area.  Pt demonstrates improved LE strength with primary deficits in plantar-flexion.  Pt also has difficulty getting out of bathtub due to pain, and would like to improve this.  Pt would benefit from further PT intervention to practice stair training and build up gastroc-soleus strength for stairs/ADLs.  -AC     Please refer to paper survey for additional self-reported information Yes  -AC     Rehab Potential Good  -AC     Patient/caregiver participated in establishment of treatment plan and goals Yes  -AC     Patient would benefit from skilled therapy intervention Yes  -AC        PT Plan    PT Frequency 1x/week  -AC     Predicted Duration of Therapy Intervention (Therapy Eval) 4-6 weeks  -AC     Planned CPT's? PT EVAL LOW COMPLEXITY: 78903;PT THER PROC EA 15 MIN: 65559;PT THER SUPP EA 15 MIN;PT THER ACT EA 15 MIN: 08918;PT ELECTRICAL STIM UNATTEND: ;PT MANUAL THERAPY EA 15 MIN: 79003;PT HOT/COLD PACK WC NONMCARE: 71456;PT RE-EVAL: 82134;PT NEUROMUSC RE-EDUCATION EA 15 MIN: 06324;PT SELF CARE/HOME MGMT/TRAIN EA 15: 46423;PT ULTRASOUND EA 15 MIN: 79183  -AC     PT Plan Comments Progress LE strengthening as tolerated, with manual therapy and modalities as needed.  -AC       User Key  (r) = Recorded By, (t) = Taken By, (c) = Cosigned By    Initials Name Provider Type    AC Lisandra Benavides, PT Physical Therapist              Exercises     Row Name 10/04/18 7142             Subjective Comments    Subjective Comments Pt states she has just a little bit of pain in L knee occasionally with walking and certain movements.  -AC         Subjective Pain    Able to rate subjective pain? yes  -AC      Pre-Treatment Pain Level 0  -AC       "Post-Treatment Pain Level 0  -AC         Total Minutes    40197 - PT Therapeutic Exercise Minutes 40  -AC         Exercise 1    Exercise Name 1 Exercises performed in clinical setting as per flow sheet.  -AC      Time 1 40  -AC      Additional Comments Reassessment measures included in therex time.  -AC        User Key  (r) = Recorded By, (t) = Taken By, (c) = Cosigned By    Initials Name Provider Type    Lisandra Montanez, PT Physical Therapist              PT OP Goals     Row Name 10/04/18 0755          PT Short Term Goals    STG Date to Achieve 09/27/18  -AC     STG 1 Decrease L knee pain by > or = 50%.  -AC     STG 1 Progress Met  -     STG 2 Perform independent HEP to improve L knee strength/flexibility and manage pain.  -AC     STG 2 Progress Met  -     STG 3 Enable ambulation up/down stairs with < or = 3/10 pain.  -AC     STG 3 Progress Ongoing   Still 6/10 pain going up steps; tender/nagging in mid-calf  -AC        Long Term Goals    LTG Date to Achieve 10/18/18  -AC     LTG 1 Decrease L knee pain by > or = 75%.  -AC     LTG 1 Progress Met   \"80-89% better\"  -AC     LTG 2 Improve LEFS score to > or = 65/80 to reflect significant improvement in use of LLE for ADLs/functional mobility.  -AC     LTG 2 Progress Ongoing;Progressing   From 50/80 to 58/80  -AC     LTG 3 Enable pain-free ambulation up and down stairs.  -AC     LTG 3 Progress Ongoing   6/10 pain up steps  -        Time Calculation    PT Goal Re-Cert Due Date 12/05/18  -       User Key  (r) = Recorded By, (t) = Taken By, (c) = Cosigned By    Initials Name Provider Type    Lisandra Montanez, PT Physical Therapist        Outcome Measure Options: Lower Extremity Functional Scale (LEFS)  Lower Extremity Functional Index  Any of your usual work, housework or school activities: No difficulty  Your usual hobbies, recreational or sporting activities: No difficulty  Getting into or out of the bath: A little bit of difficulty  Walking between " rooms: No difficulty  Putting on your shoes or socks: No difficulty  Squatting: A little bit of difficulty  Lifting an object, like a bag of groceries from the floor: No difficulty  Performing light activities around your home: No difficulty  Performing heavy activities around your home: A little bit of difficulty  Getting into or out of a car: No difficulty  Walking 2 blocks: No difficulty  Walking a mile: No difficulty  Going up or down 10 stairs (about 1 flight of stairs): Moderate difficulty  Standing for 1 hour: No difficulty  Sitting for 1 hour: No difficulty  Running on even ground: Extreme difficulty or unable to perform activity  Running on uneven ground: Extreme difficulty or unable to perform activity  Making sharp turns while running fast: Extreme difficulty or unable to perform activity  Hopping: Extreme difficulty or unable to perform activity  Rolling over in bed: A little bit of difficulty  Total: 58    Time Calculation:   Start Time: 0755  Therapy Suggested Charges     Code   Minutes Charges    05188 (CPT®) Hc Pt Neuromusc Re Education Ea 15 Min      10235 (CPT®) Hc Pt Ther Proc Ea 15 Min 40 3    30356 (CPT®) Hc Gait Training Ea 15 Min      44477 (CPT®) Hc Pt Therapeutic Act Ea 15 Min      17730 (CPT®) Hc Pt Manual Therapy Ea 15 Min      96411 (CPT®) Hc Pt Ther Massage- Per 15 Min      67976 (CPT®) Hc Pt Iontophoresis Ea 15 Min      03774 (CPT®) Hc Pt Elec Stim Ea-Per 15 Min      30674 (CPT®) Hc Pt Ultrasound Ea 15 Min      40292 (CPT®) Hc Pt Self Care/Mgmt/Train Ea 15 Min      71340 (CPT®) Hc Pt Prosthetic (S) Train Initial Encounter, Each 15 Min      91663 (CPT®) Hc Orthotic(S) Mgmt/Train Initial Encounter, Each 15min      98709 (CPT®) Hc Pt Aquatic Therapy Ea 15 Min      43319 (CPT®) Hc Pt Orthotic(S)/Prosthetic(S) Encounter, Each 15 Min       (CPT®) Hc Pt Electrical Stim Unattended      Total  40 3        Therapy Charges for Today     Code Description Service Date Service Provider Modifiers  Qty    31284754068  PT THER PROC EA 15 MIN 10/4/2018 Lisandra Benavides, PT GP 3        PT G-Codes  Outcome Measure Options: Lower Extremity Functional Scale (LEFS)  Total: 58         Lisandra Benavides, PT  10/4/2018

## 2018-10-04 NOTE — THERAPY PROGRESS REPORT/RE-CERT
Outpatient Physical Therapy Ortho Progress Note   [place]     Patient Name: Davina Delgado  : 1970  MRN: [id number]  Today's Date: 10/4/2018      Visit Date: 10/04/2018    Visit Dx:    ICD-10-CM ICD-9-CM   1. Primary osteoarthritis of left knee M17.12 715.16   2. Chronic pain of left knee M25.562 719.46    G89.29 338.29       Patient Active Problem List   Diagnosis   • Hypertension   • Vitamin D deficiency   • Obesity   • Physical exam, annual   • Hyperlipidemia   • Nonintractable episodic headache   • Abnormal uterine and vaginal bleeding, unspecified   • Localized edema   • Abnormal uterine bleeding (AUB)   • Pap smear for cervical cancer screening   • Iron deficiency anemia   • Hematuria   • Urine frequency   • Pain of left calf        Past Medical History:   Diagnosis Date   • Hypertension    • Obesity    • Vitamin D deficiency         Past Surgical History:   Procedure Laterality Date   • NO PAST SURGERIES     • TUBAL ABDOMINAL LIGATION               PT Ortho     Row Name 10/04/18 0755       Subjective Pain    Post-Treatment Pain Level 0  -AC       Myotomal Screen- Lower Quarter Clearing    Hip flexion (L2) Right:;5 (Normal);Left:;4+ (Good +)  -AC    Knee extension (L3) Bilateral:;5 (Normal)  -AC    Ankle DF (L4) Bilateral:;5 (Normal)  -AC    Knee flexion (S2) Bilateral:;5 (Normal)   L: pain in anterior knee but better than before  -AC       Special Tests/Palpation    Special Tests/Palpation Knee  -AC       Knee Palpation    Knee Palpation? Yes   Improved TTP to medial distal MCL/pes anserine; TTP mid-calf  -AC      User Key  (r) = Recorded By, (t) = Taken By, (c) = Cosigned By    Initials Name Provider Type    AC Lisandra Benavides, PT Physical Therapist              PT Assessment/Plan     Row Name 10/04/18 0755          PT Assessment    Functional Limitations Performance in self-care ADL;Limitation in home management;Impaired locomotion  -AC     Impairments Pain;Joint integrity;Muscle  strength;Locomotion  -AC     Assessment Comments Pt reports significant improvement in L knee pain (80-89%) and feels exercises in clinic/at home have been helping.  Pt has improved tenderness to palpation to medial knee, however notes pain with going up steps in L mid-calf area.  Pt demonstrates improved LE strength with primary deficits in plantar-flexion.  Pt also has difficulty getting out of bathtub due to pain, and would like to improve this.  Pt would benefit from further PT intervention to practice stair training and build up gastroc-soleus strength for stairs/ADLs.  -AC     Please refer to paper survey for additional self-reported information Yes  -AC     Rehab Potential Good  -AC     Patient/caregiver participated in establishment of treatment plan and goals Yes  -AC     Patient would benefit from skilled therapy intervention Yes  -AC        PT Plan    PT Frequency 1x/week  -AC     Predicted Duration of Therapy Intervention (Therapy Eval) 4-6 weeks  -AC     Planned CPT's? PT EVAL LOW COMPLEXITY: 25094;PT THER PROC EA 15 MIN: 90628;PT THER SUPP EA 15 MIN;PT THER ACT EA 15 MIN: 23202;PT ELECTRICAL STIM UNATTEND: ;PT MANUAL THERAPY EA 15 MIN: 78738;PT HOT/COLD PACK WC NONMCARE: 38854;PT RE-EVAL: 26347;PT NEUROMUSC RE-EDUCATION EA 15 MIN: 84774;PT SELF CARE/HOME MGMT/TRAIN EA 15: 30567;PT ULTRASOUND EA 15 MIN: 61284  -AC     PT Plan Comments Progress LE strengthening as tolerated, with manual therapy and modalities as needed.  -AC       User Key  (r) = Recorded By, (t) = Taken By, (c) = Cosigned By    Initials Name Provider Type    AC Lisandra Benavides, PT Physical Therapist              Exercises     Row Name 10/04/18 5350             Subjective Comments    Subjective Comments Pt states she has just a little bit of pain in L knee occasionally with walking and certain movements.  -AC         Subjective Pain    Able to rate subjective pain? yes  -AC      Pre-Treatment Pain Level 0  -AC       "Post-Treatment Pain Level 0  -AC         Total Minutes    50139 - PT Therapeutic Exercise Minutes 40  -AC         Exercise 1    Exercise Name 1 Exercises performed in clinical setting as per flow sheet.  -AC      Time 1 40  -AC      Additional Comments Reassessment measures included in therex time.  -AC        User Key  (r) = Recorded By, (t) = Taken By, (c) = Cosigned By    Initials Name Provider Type    Lisandra Montanez, PT Physical Therapist              PT OP Goals     Row Name 10/04/18 0755          PT Short Term Goals    STG Date to Achieve 09/27/18  -AC     STG 1 Decrease L knee pain by > or = 50%.  -AC     STG 1 Progress Met  -     STG 2 Perform independent HEP to improve L knee strength/flexibility and manage pain.  -AC     STG 2 Progress Met  -     STG 3 Enable ambulation up/down stairs with < or = 3/10 pain.  -AC     STG 3 Progress Ongoing   Still 6/10 pain going up steps; tender/nagging in mid-calf  -AC        Long Term Goals    LTG Date to Achieve 10/18/18  -AC     LTG 1 Decrease L knee pain by > or = 75%.  -AC     LTG 1 Progress Met   \"80-89% better\"  -AC     LTG 2 Improve LEFS score to > or = 65/80 to reflect significant improvement in use of LLE for ADLs/functional mobility.  -AC     LTG 2 Progress Ongoing;Progressing   From 50/80 to 58/80  -AC     LTG 3 Enable pain-free ambulation up and down stairs.  -AC     LTG 3 Progress Ongoing   6/10 pain up steps  -        Time Calculation    PT Goal Re-Cert Due Date 12/05/18  -       User Key  (r) = Recorded By, (t) = Taken By, (c) = Cosigned By    Initials Name Provider Type    Lisandra Montanez, PT Physical Therapist        Outcome Measure Options: Lower Extremity Functional Scale (LEFS)  Lower Extremity Functional Index  Any of your usual work, housework or school activities: No difficulty  Your usual hobbies, recreational or sporting activities: No difficulty  Getting into or out of the bath: A little bit of difficulty  Walking between " rooms: No difficulty  Putting on your shoes or socks: No difficulty  Squatting: A little bit of difficulty  Lifting an object, like a bag of groceries from the floor: No difficulty  Performing light activities around your home: No difficulty  Performing heavy activities around your home: A little bit of difficulty  Getting into or out of a car: No difficulty  Walking 2 blocks: No difficulty  Walking a mile: No difficulty  Going up or down 10 stairs (about 1 flight of stairs): Moderate difficulty  Standing for 1 hour: No difficulty  Sitting for 1 hour: No difficulty  Running on even ground: Extreme difficulty or unable to perform activity  Running on uneven ground: Extreme difficulty or unable to perform activity  Making sharp turns while running fast: Extreme difficulty or unable to perform activity  Hopping: Extreme difficulty or unable to perform activity  Rolling over in bed: A little bit of difficulty  Total: 58    Time Calculation:   Start Time: 0755  Therapy Suggested Charges     Code   Minutes Charges    42575 (CPT®) Hc Pt Neuromusc Re Education Ea 15 Min      61133 (CPT®) Hc Pt Ther Proc Ea 15 Min 40 3    29149 (CPT®) Hc Gait Training Ea 15 Min      95718 (CPT®) Hc Pt Therapeutic Act Ea 15 Min      11008 (CPT®) Hc Pt Manual Therapy Ea 15 Min      78921 (CPT®) Hc Pt Ther Massage- Per 15 Min      51899 (CPT®) Hc Pt Iontophoresis Ea 15 Min      77707 (CPT®) Hc Pt Elec Stim Ea-Per 15 Min      92922 (CPT®) Hc Pt Ultrasound Ea 15 Min      74163 (CPT®) Hc Pt Self Care/Mgmt/Train Ea 15 Min      44243 (CPT®) Hc Pt Prosthetic (S) Train Initial Encounter, Each 15 Min      87268 (CPT®) Hc Orthotic(S) Mgmt/Train Initial Encounter, Each 15min      45899 (CPT®) Hc Pt Aquatic Therapy Ea 15 Min      90457 (CPT®) Hc Pt Orthotic(S)/Prosthetic(S) Encounter, Each 15 Min       (CPT®) Hc Pt Electrical Stim Unattended      Total  40 3        Therapy Charges for Today     Code Description Service Date Service Provider Modifiers  Qty    [id number]  PT THER PROC EA 15 MIN 10/4/2018 Lisandra Benavides, PT GP 3        PT G-Codes  Outcome Measure Options: Lower Extremity Functional Scale (LEFS)  Total: 58         Lisandra Benavides, PT  10/4/2018

## 2018-10-11 ENCOUNTER — HOSPITAL ENCOUNTER (OUTPATIENT)
Dept: PHYSICAL THERAPY | Facility: HOSPITAL | Age: 48
Setting detail: THERAPIES SERIES
Discharge: HOME | End: 2018-10-11

## 2018-10-11 DIAGNOSIS — M17.12 PRIMARY OSTEOARTHRITIS OF LEFT KNEE: Primary | ICD-10-CM

## 2018-10-11 DIAGNOSIS — M25.562 CHRONIC PAIN OF LEFT KNEE: ICD-10-CM

## 2018-10-11 DIAGNOSIS — G89.29 CHRONIC PAIN OF LEFT KNEE: ICD-10-CM

## 2018-10-11 PROCEDURE — 97110 THERAPEUTIC EXERCISES: CPT

## 2018-10-11 PROCEDURE — 97140 MANUAL THERAPY 1/> REGIONS: CPT

## 2018-10-11 NOTE — THERAPY TREATMENT NOTE
Outpatient Physical Therapy Ortho Treatment Note  [facility]     Patient Name: Davina Delgado  : 1970  MRN: [id number]  Today's Date: 10/11/2018      Visit Date: 10/11/2018    Visit Dx:    ICD-10-CM ICD-9-CM   1. Primary osteoarthritis of left knee M17.12 715.16   2. Chronic pain of left knee M25.562 719.46    G89.29 338.29       Patient Active Problem List   Diagnosis   • Hypertension   • Vitamin D deficiency   • Obesity   • Physical exam, annual   • Hyperlipidemia   • Nonintractable episodic headache   • Abnormal uterine and vaginal bleeding, unspecified   • Localized edema   • Abnormal uterine bleeding (AUB)   • Pap smear for cervical cancer screening   • Iron deficiency anemia   • Hematuria   • Urine frequency   • Pain of left calf        Past Medical History:   Diagnosis Date   • Hypertension    • Obesity    • Vitamin D deficiency         Past Surgical History:   Procedure Laterality Date   • NO PAST SURGERIES     • TUBAL ABDOMINAL LIGATION             PT Assessment/Plan     Row Name 10/11/18 0800          PT Assessment    Assessment Comments Pt tolerated today's session well, with primary c/o tenderness in medial proximal calf muscle.  Pain was not reproduced with ankle resistive testing, but was tender to palpation. Pt tolerated manual therapy well with mild reports of relief.  -AC        PT Plan    PT Plan Comments Progress exercises as tolerated, focusing on exercises to improve pt's ability to transition from bathtub to standing.  -AC       User Key  (r) = Recorded By, (t) = Taken By, (c) = Cosigned By    Initials Name Provider Type    AC Lisandra Benavides, PT Physical Therapist              Exercises     Row Name 10/11/18 0800             Subjective Comments    Subjective Comments Pt states she had a mild throbbing sensation in L knee two days ago, but otherwise knee is doing well.  -AC         Subjective Pain    Able to rate subjective pain? yes  -AC      Pre-Treatment Pain Level 0   -AC      Post-Treatment Pain Level 0  -AC         Total Minutes    21939 - PT Therapeutic Exercise Minutes 15  -AC      41197 - PT Manual Therapy Minutes 15  -AC         Exercise 1    Exercise Name 1 Exercises performed in clinical setting as per flow sheet.  -AC      Time 1 15  -AC        User Key  (r) = Recorded By, (t) = Taken By, (c) = Cosigned By    Initials Name Provider Type    AC Lisandra Benavides, PT Physical Therapist           Manual Rx (last 36 hours)      Manual Treatments     Row Name 10/11/18 0800             Total Minutes    64711 - PT Manual Therapy Minutes 15  -AC         Manual Rx 1    Manual Rx 1 Location L gastroc-soleus complex  -AC      Manual Rx 1 Type Provided soft tissue mobilization using tools with light-moderate pressure, globally to L gastroc-soleus complex with focus on medial head  -AC      Manual Rx 1 Duration 15 mins  -AC        User Key  (r) = Recorded By, (t) = Taken By, (c) = Cosigned By    Initials Name Provider Type    AC Lisandra Benavides, PT Physical Therapist        Time Calculation:   Start Time: 0800  Therapy Suggested Charges     Code   Minutes Charges    54352 (CPT®) Hc Pt Neuromusc Re Education Ea 15 Min      13540 (CPT®) Hc Pt Ther Proc Ea 15 Min 15 1    14369 (CPT®) Hc Gait Training Ea 15 Min      39516 (CPT®) Hc Pt Therapeutic Act Ea 15 Min      96589 (CPT®) Hc Pt Manual Therapy Ea 15 Min 15 1    21863 (CPT®) Hc Pt Ther Massage- Per 15 Min      24467 (CPT®) Hc Pt Iontophoresis Ea 15 Min      09932 (CPT®) Hc Pt Elec Stim Ea-Per 15 Min      65502 (CPT®) Hc Pt Ultrasound Ea 15 Min      29400 (CPT®) Hc Pt Self Care/Mgmt/Train Ea 15 Min      00896 (CPT®) Hc Pt Prosthetic (S) Train Initial Encounter, Each 15 Min      97159 (CPT®) Hc Orthotic(S) Mgmt/Train Initial Encounter, Each 15min      24490 (CPT®) Hc Pt Aquatic Therapy Ea 15 Min      40608 (CPT®) Hc Pt Orthotic(S)/Prosthetic(S) Encounter, Each 15 Min       (CPT®) Hc Pt Electrical Stim Unattended      Total   30 2        Therapy Charges for Today     Code Description Service Date Service Provider Modifiers Qty    38495846512  PT THER PROC EA 15 MIN 10/11/2018 Lisandra Benavides, PT GP 1    [id number]  PT MANUAL THERAPY EA 15 MIN 10/11/2018 Lisandra Benavides, PT GP 1        Lisandra Benavides, PT  10/11/2018

## 2018-10-25 ENCOUNTER — HOSPITAL ENCOUNTER (OUTPATIENT)
Dept: PHYSICAL THERAPY | Facility: HOSPITAL | Age: 48
Setting detail: THERAPIES SERIES
Discharge: HOME | End: 2018-10-25

## 2018-10-25 DIAGNOSIS — M17.12 PRIMARY OSTEOARTHRITIS OF LEFT KNEE: Primary | ICD-10-CM

## 2018-10-25 DIAGNOSIS — M25.562 CHRONIC PAIN OF LEFT KNEE: ICD-10-CM

## 2018-10-25 DIAGNOSIS — G89.29 CHRONIC PAIN OF LEFT KNEE: ICD-10-CM

## 2018-10-25 PROCEDURE — 97140 MANUAL THERAPY 1/> REGIONS: CPT

## 2018-10-25 PROCEDURE — 97110 THERAPEUTIC EXERCISES: CPT

## 2018-10-25 NOTE — THERAPY TREATMENT NOTE
Outpatient Physical Therapy Ortho Treatment Note   [facility]     Patient Name: Davina Delgado  : 1970  MRN: [id number]  Today's Date: 10/25/2018      Visit Date: 10/25/2018    Visit Dx:    ICD-10-CM ICD-9-CM   1. Primary osteoarthritis of left knee M17.12 715.16   2. Chronic pain of left knee M25.562 719.46    G89.29 338.29       Patient Active Problem List   Diagnosis   • Hypertension   • Vitamin D deficiency   • Obesity   • Physical exam, annual   • Hyperlipidemia   • Nonintractable episodic headache   • Abnormal uterine and vaginal bleeding, unspecified   • Localized edema   • Abnormal uterine bleeding (AUB)   • Pap smear for cervical cancer screening   • Iron deficiency anemia   • Hematuria   • Urine frequency   • Pain of left calf        Past Medical History:   Diagnosis Date   • Hypertension    • Obesity    • Vitamin D deficiency         Past Surgical History:   Procedure Laterality Date   • NO PAST SURGERIES     • TUBAL ABDOMINAL LIGATION                               PT Assessment/Plan     Row Name 10/25/18 0759          PT Assessment    Assessment Comments Pt tolerated today's session well, with focus on improving L gastroc-soleus strength and pliability.  -AC        PT Plan    PT Plan Comments Progress exercises with care, continuing ASTYM to address soft tissue adhesions.  -AC       User Key  (r) = Recorded By, (t) = Taken By, (c) = Cosigned By    Initials Name Provider Type    AC Lisandra Benavides, PT Physical Therapist                    Exercises     Row Name 10/25/18 0759             Subjective Comments    Subjective Comments Pt states she has some remaining tenderness along medial calf area that hurts to stretch, pain comes/goes.  -AC         Subjective Pain    Able to rate subjective pain? yes  -AC      Pre-Treatment Pain Level 0  -AC      Post-Treatment Pain Level 0  -AC         Total Minutes    12739 - PT Therapeutic Exercise Minutes 18  -AC      76139 - PT Manual Therapy  Minutes 13  -AC         Exercise 1    Exercise Name 1 Exercises performed in clinical setting as per flow sheet.  -AC      Time 1 18  -AC        User Key  (r) = Recorded By, (t) = Taken By, (c) = Cosigned By    Initials Name Provider Type    Lisandra Montanez, PT Physical Therapist                        Manual Rx (last 36 hours)      Manual Treatments     Row Name 10/25/18 0759             Total Minutes    61456 - PT Manual Therapy Minutes 13  -AC         Manual Rx 1    Manual Rx 1 Location L gastroc-soleus complex  -AC      Manual Rx 1 Type Provided soft tissue mobilization using tools with light-moderate pressure, globally to L gastroc-soleus complex with focus on medial head  -AC      Manual Rx 1 Duration 13  -AC        User Key  (r) = Recorded By, (t) = Taken By, (c) = Cosigned By    Initials Name Provider Type    Lisandra Montanez, PT Physical Therapist                             Time Calculation:   Start Time: 0759  Therapy Suggested Charges     Code   Minutes Charges    95705 (CPT®) Hc Pt Neuromusc Re Education Ea 15 Min      89783 (CPT®) Hc Pt Ther Proc Ea 15 Min 18 1    88644 (CPT®) Hc Gait Training Ea 15 Min      72152 (CPT®) Hc Pt Therapeutic Act Ea 15 Min      20846 (CPT®) Hc Pt Manual Therapy Ea 15 Min 13 1    80529 (CPT®) Hc Pt Ther Massage- Per 15 Min      40070 (CPT®) Hc Pt Iontophoresis Ea 15 Min      35024 (CPT®) Hc Pt Elec Stim Ea-Per 15 Min      12419 (CPT®) Hc Pt Ultrasound Ea 15 Min      72305 (CPT®) Hc Pt Self Care/Mgmt/Train Ea 15 Min      66855 (CPT®) Hc Pt Prosthetic (S) Train Initial Encounter, Each 15 Min      79627 (CPT®) Hc Orthotic(S) Mgmt/Train Initial Encounter, Each 15min      91273 (CPT®) Hc Pt Aquatic Therapy Ea 15 Min      62002 (CPT®) Hc Pt Orthotic(S)/Prosthetic(S) Encounter, Each 15 Min       (CPT®) Hc Pt Electrical Stim Unattended      Total  31 2        Therapy Charges for Today     Code Description Service Date Service Provider Modifiers Qty    37675157375  HC PT THER PROC EA 15 MIN 10/25/2018 Lisandra Benavides, PT GP 1    01502255313 HC PT MANUAL THERAPY EA 15 MIN 10/25/2018 Lisandra Benavides, PT GP 1                    Lisandra Benavides, PT  10/25/2018

## 2018-10-31 ENCOUNTER — TRANSCRIBE ORDERS (OUTPATIENT)
Dept: FAMILY MEDICINE CLINIC | Facility: CLINIC | Age: 48
End: 2018-10-31

## 2018-10-31 DIAGNOSIS — Z12.31 VISIT FOR SCREENING MAMMOGRAM: Primary | ICD-10-CM

## 2018-11-05 DIAGNOSIS — I10 ESSENTIAL HYPERTENSION: ICD-10-CM

## 2018-11-05 DIAGNOSIS — R60.0 LOCALIZED EDEMA: ICD-10-CM

## 2018-11-05 RX ORDER — LOSARTAN POTASSIUM 50 MG/1
TABLET ORAL
Qty: 90 TABLET | Refills: 0 | Status: SHIPPED | OUTPATIENT
Start: 2018-11-05 | End: 2018-11-20

## 2018-11-07 ENCOUNTER — HOSPITAL ENCOUNTER (OUTPATIENT)
Dept: PHYSICAL THERAPY | Facility: HOSPITAL | Age: 48
Setting detail: THERAPIES SERIES
Discharge: HOME | End: 2018-11-07

## 2018-11-07 DIAGNOSIS — G89.29 CHRONIC PAIN OF LEFT KNEE: ICD-10-CM

## 2018-11-07 DIAGNOSIS — M17.12 PRIMARY OSTEOARTHRITIS OF LEFT KNEE: Primary | ICD-10-CM

## 2018-11-07 DIAGNOSIS — M25.562 CHRONIC PAIN OF LEFT KNEE: ICD-10-CM

## 2018-11-07 PROCEDURE — 97110 THERAPEUTIC EXERCISES: CPT

## 2018-11-07 NOTE — THERAPY PROGRESS REPORT/RE-CERT
Outpatient Physical Therapy Ortho Progress Note  [facility]     Patient Name: Davina Delgado  : 1970  MRN: [id number]  Today's Date: 2018      Visit Date: 2018    Visit Dx:    ICD-10-CM ICD-9-CM   1. Primary osteoarthritis of left knee M17.12 715.16   2. Chronic pain of left knee M25.562 719.46    G89.29 338.29       Patient Active Problem List   Diagnosis   • Hypertension   • Vitamin D deficiency   • Obesity   • Physical exam, annual   • Hyperlipidemia   • Nonintractable episodic headache   • Abnormal uterine and vaginal bleeding, unspecified   • Localized edema   • Abnormal uterine bleeding (AUB)   • Pap smear for cervical cancer screening   • Iron deficiency anemia   • Hematuria   • Urine frequency   • Pain of left calf        Past Medical History:   Diagnosis Date   • Hypertension    • Obesity    • Vitamin D deficiency         Past Surgical History:   Procedure Laterality Date   • NO PAST SURGERIES     • TUBAL ABDOMINAL LIGATION             PT Assessment/Plan     Row Name 18 0946          PT Assessment    Functional Limitations Performance in leisure activities;Limitation in home management;Limitations in community activities  -AC     Impairments Pain;Joint integrity;Impaired muscle length  -AC     Assessment Comments Pt has met one additional STG this session (improved ambulation up/down steps), however she demonstrated a decrease in LEFS score this session by 5 points.  Pt now has c/o bilateral knee pain associated with arthritis.  Pt continues to have notable abnormal soft tissue pliability in bilateral gastroc-soleus complexes and mild difficulty with ambulation secondary to pain.  Pt would benefit from a few more visits to receive ASTYM treatments to resolve soft tissue adhesions.  Otherwise, pt demonstrates good strength in bilateral lower extremities and is compliant with home program.  -AC     Please refer to paper survey for additional self-reported information Yes   -AC     Rehab Potential Good  -AC     Patient/caregiver participated in establishment of treatment plan and goals Yes  -AC     Patient would benefit from skilled therapy intervention Yes  -AC        PT Plan    PT Frequency 1x/week  -AC     Predicted Duration of Therapy Intervention (Therapy Eval) 4 weeks  -AC     Planned CPT's? PT RE-EVAL: 02082;PT NEUROMUSC RE-EDUCATION EA 15 MIN: 53320;PT SELF CARE/HOME MGMT/TRAIN EA 15: 13852;PT ULTRASOUND EA 15 MIN: 31922;PT IONTOPHORESIS EA 15 MIN: 73023;PT EVAL LOW COMPLEXITY: 46004;PT THER PROC EA 15 MIN: 13294;PT THER SUPP EA 15 MIN;PT THER ACT EA 15 MIN: 20969;PT ELECTRICAL STIM UNATTEND: ;PT MANUAL THERAPY EA 15 MIN: 15665;PT HOT/COLD PACK WC NONMCARE: 15471;PT THER MASS EA 15 MIN: 47954  -AC     PT Plan Comments Warm up with treadmill, perform ASTYM to bilateral gastroc-soleus complexes.  -AC       User Key  (r) = Recorded By, (t) = Taken By, (c) = Cosigned By    Initials Name Provider Type    AC Lisandra Benavides, PT Physical Therapist              Exercises     Row Name 11/07/18 0946             Subjective Comments    Subjective Comments Pt states both of her knees are hurting her today, related to the cold weather.  -AC         Subjective Pain    Able to rate subjective pain? yes  -AC      Pre-Treatment Pain Level 6  -AC         Total Minutes    47711 - PT Therapeutic Exercise Minutes 40  -AC         Exercise 1    Exercise Name 1 Exercises performed in clinical setting as per flow sheet.  -AC      Time 1 40  -AC      Additional Comments Reassessment measures included in therex time.  -AC        User Key  (r) = Recorded By, (t) = Taken By, (c) = Cosigned By    Initials Name Provider Type    AC Lisandra Benavides, PT Physical Therapist              PT OP Goals     Row Name 11/07/18 0946          PT Short Term Goals    STG Date to Achieve 09/27/18  -AC     STG 1 Decrease L knee pain by > or = 50%.  -AC     STG 1 Progress Met  -AC     STG 2 Perform independent HEP  "to improve L knee strength/flexibility and manage pain.  -AC     STG 2 Progress Met  -AC     STG 3 Enable ambulation up/down stairs with < or = 3/10 pain.  -AC     STG 3 Progress Met   Fine going down steps; have to go slow going up steps 1/10  -AC        Long Term Goals    LTG Date to Achieve 10/18/18  -AC     LTG 1 Decrease L knee pain by > or = 75%.  -AC     LTG 1 Progress Met   \"80-89% better\"  -AC     LTG 2 Improve LEFS score to > or = 65/80 to reflect significant improvement in use of LLE for ADLs/functional mobility.  -AC     LTG 2 Progress Ongoing   From 58/80 regressed to 53/80  -AC     LTG 3 Enable pain-free ambulation up and down stairs.  -AC     LTG 3 Progress Ongoing;Progressing   1/10 pain going up steps  -AC        Time Calculation    PT Goal Re-Cert Due Date 12/05/18  -       User Key  (r) = Recorded By, (t) = Taken By, (c) = Cosigned By    Initials Name Provider Type    Lisandra Montanez, PT Physical Therapist        Outcome Measure Options: Lower Extremity Functional Scale (LEFS)  Lower Extremity Functional Index  Any of your usual work, housework or school activities: A little bit of difficulty  Your usual hobbies, recreational or sporting activities: A little bit of difficulty  Getting into or out of the bath: Moderate difficulty  Walking between rooms: No difficulty  Putting on your shoes or socks: A little bit of difficulty  Squatting: A little bit of difficulty  Lifting an object, like a bag of groceries from the floor: No difficulty  Performing light activities around your home: No difficulty  Performing heavy activities around your home: A little bit of difficulty  Getting into or out of a car: No difficulty  Walking 2 blocks: No difficulty  Walking a mile: No difficulty  Going up or down 10 stairs (about 1 flight of stairs): A little bit of difficulty  Standing for 1 hour: A little bit of difficulty  Sitting for 1 hour: A little bit of difficulty  Running on even ground: Extreme " difficulty or unable to perform activity  Running on uneven ground: Extreme difficulty or unable to perform activity  Making sharp turns while running fast: Extreme difficulty or unable to perform activity  Hopping: Quite a bit of difficulty  Rolling over in bed: Moderate difficulty  Total: 53     Time Calculation:   Start Time: 0946  Therapy Suggested Charges     Code   Minutes Charges    50555 (CPT®) Hc Pt Neuromusc Re Education Ea 15 Min      44971 (CPT®) Hc Pt Ther Proc Ea 15 Min 40 3    37903 (CPT®) Hc Gait Training Ea 15 Min      15419 (CPT®) Hc Pt Therapeutic Act Ea 15 Min      98854 (CPT®) Hc Pt Manual Therapy Ea 15 Min      31036 (CPT®) Hc Pt Ther Massage- Per 15 Min      73876 (CPT®) Hc Pt Iontophoresis Ea 15 Min      55661 (CPT®) Hc Pt Elec Stim Ea-Per 15 Min      09150 (CPT®) Hc Pt Ultrasound Ea 15 Min      79705 (CPT®) Hc Pt Self Care/Mgmt/Train Ea 15 Min      63110 (CPT®) Hc Pt Prosthetic (S) Train Initial Encounter, Each 15 Min      15254 (CPT®) Hc Orthotic(S) Mgmt/Train Initial Encounter, Each 15min      69230 (CPT®) Hc Pt Aquatic Therapy Ea 15 Min      44766 (CPT®) Hc Pt Orthotic(S)/Prosthetic(S) Encounter, Each 15 Min       (CPT®) Hc Pt Electrical Stim Unattended      Total  40 3        Therapy Charges for Today     Code Description Service Date Service Provider Modifiers Qty    86049266493 HC PT THER PROC EA 15 MIN 11/7/2018 Lisandra Benavides, PT GP 3        PT G-Codes  Outcome Measure Options: Lower Extremity Functional Scale (LEFS)  Total: 53     Lisandra Benavides, PT  11/7/2018

## 2018-11-09 ENCOUNTER — HOSPITAL ENCOUNTER (OUTPATIENT)
Dept: PHYSICAL THERAPY | Facility: HOSPITAL | Age: 48
Setting detail: THERAPIES SERIES
Discharge: HOME | End: 2018-11-09

## 2018-11-09 DIAGNOSIS — G89.29 CHRONIC PAIN OF LEFT KNEE: ICD-10-CM

## 2018-11-09 DIAGNOSIS — M17.12 PRIMARY OSTEOARTHRITIS OF LEFT KNEE: Primary | ICD-10-CM

## 2018-11-09 DIAGNOSIS — M25.562 CHRONIC PAIN OF LEFT KNEE: ICD-10-CM

## 2018-11-09 PROCEDURE — 97110 THERAPEUTIC EXERCISES: CPT

## 2018-11-09 PROCEDURE — 97140 MANUAL THERAPY 1/> REGIONS: CPT

## 2018-11-09 NOTE — THERAPY TREATMENT NOTE
"    Outpatient Physical Therapy Ortho Treatment Note  [facility]     Patient Name: Davina Delgado  : 1970  MRN: [id number]  Today's Date: 2018      Visit Date: 2018    Visit Dx:    ICD-10-CM ICD-9-CM   1. Primary osteoarthritis of left knee M17.12 715.16   2. Chronic pain of left knee M25.562 719.46    G89.29 338.29       Patient Active Problem List   Diagnosis   • Hypertension   • Vitamin D deficiency   • Obesity   • Physical exam, annual   • Hyperlipidemia   • Nonintractable episodic headache   • Abnormal uterine and vaginal bleeding, unspecified   • Localized edema   • Abnormal uterine bleeding (AUB)   • Pap smear for cervical cancer screening   • Iron deficiency anemia   • Hematuria   • Urine frequency   • Pain of left calf        Past Medical History:   Diagnosis Date   • Hypertension    • Obesity    • Vitamin D deficiency         Past Surgical History:   Procedure Laterality Date   • NO PAST SURGERIES     • TUBAL ABDOMINAL LIGATION                 PT Assessment/Plan     Row Name 18 1250          PT Assessment    Assessment Comments Patient completed ther ex without increase in symptoms  -        PT Plan    PT Plan Comments continue further P.T. pending ins. approval for more visits  -       User Key  (r) = Recorded By, (t) = Taken By, (c) = Cosigned By    Initials Name Provider Type    LF Ada Gregg, PT Physical Therapist                    Exercises     Row Name 18 1250             Subjective Comments    Subjective Comments Reports increased pain last night \"8/10\" and contributes to cold weater.  No pain currently - took tylenol before coming.  -LF         Subjective Pain    Able to rate subjective pain? yes  -LF      Pre-Treatment Pain Level 0  -LF      Post-Treatment Pain Level 0  -LF         Total Minutes    74650 - PT Therapeutic Exercise Minutes 18  -LF      93108 - PT Manual Therapy Minutes 8  -LF         Exercise 1    Exercise Name 1 Ther ex per " flowsheet.  Time spent on treadmill not included in total time  -LF      Time 1 18  -LF        User Key  (r) = Recorded By, (t) = Taken By, (c) = Cosigned By    Initials Name Provider Type    LF Ada Gregg PT Physical Therapist               Manual Rx (last 36 hours)      Manual Treatments     Row Name 11/09/18 1250             Total Minutes    48132 - PT Manual Therapy Minutes 8  -LF         Manual Rx 1    Manual Rx 1 Location L gastroc-soleus  -LF      Manual Rx 1 Type ASTYM with moderate pressure posterior knee, gastroc-soleus complex  -LF        User Key  (r) = Recorded By, (t) = Taken By, (c) = Cosigned By    Initials Name Provider Type    LF Cristino Ada Guerrero PT Physical Therapist                Time Calculation:   Start Time: 1250  Therapy Suggested Charges     Code   Minutes Charges    74847 (CPT®) Hc Pt Neuromusc Re Education Ea 15 Min      32116 (CPT®) Hc Pt Ther Proc Ea 15 Min 18 1    02734 (CPT®) Hc Gait Training Ea 15 Min      75242 (CPT®) Hc Pt Therapeutic Act Ea 15 Min      69463 (CPT®) Hc Pt Manual Therapy Ea 15 Min 8 1    21860 (CPT®) Hc Pt Ther Massage- Per 15 Min      21488 (CPT®) Hc Pt Iontophoresis Ea 15 Min      45170 (CPT®) Hc Pt Elec Stim Ea-Per 15 Min      00386 (CPT®) Hc Pt Ultrasound Ea 15 Min      13869 (CPT®) Hc Pt Self Care/Mgmt/Train Ea 15 Min      18792 (CPT®) Hc Pt Prosthetic (S) Train Initial Encounter, Each 15 Min      96436 (CPT®) Hc Orthotic(S) Mgmt/Train Initial Encounter, Each 15min      43546 (CPT®) Hc Pt Aquatic Therapy Ea 15 Min      71165 (CPT®) Hc Pt Orthotic(S)/Prosthetic(S) Encounter, Each 15 Min       (CPT®) Hc Pt Electrical Stim Unattended      Total  26 2        Therapy Charges for Today     Code Description Service Date Service Provider Modifiers Qty    80085668764 HC PT THER PROC EA 15 MIN 11/9/2018 Ada Gregg, PT GP 1    83514514043 HC PT MANUAL THERAPY EA 15 MIN 11/9/2018 Ada Gregg, PT GP 1                    Ada Gregg  PT  11/9/2018

## 2018-11-20 ENCOUNTER — OFFICE VISIT (OUTPATIENT)
Dept: FAMILY MEDICINE CLINIC | Facility: CLINIC | Age: 48
End: 2018-11-20

## 2018-11-20 VITALS
TEMPERATURE: 97.3 F | RESPIRATION RATE: 16 BRPM | WEIGHT: 293 LBS | HEIGHT: 68 IN | BODY MASS INDEX: 44.41 KG/M2 | HEART RATE: 76 BPM | OXYGEN SATURATION: 97 % | SYSTOLIC BLOOD PRESSURE: 124 MMHG | DIASTOLIC BLOOD PRESSURE: 82 MMHG

## 2018-11-20 DIAGNOSIS — E55.9 VITAMIN D DEFICIENCY: ICD-10-CM

## 2018-11-20 DIAGNOSIS — D50.0 IRON DEFICIENCY ANEMIA DUE TO CHRONIC BLOOD LOSS: ICD-10-CM

## 2018-11-20 DIAGNOSIS — Z12.11 COLON CANCER SCREENING: ICD-10-CM

## 2018-11-20 DIAGNOSIS — I10 ESSENTIAL HYPERTENSION: ICD-10-CM

## 2018-11-20 DIAGNOSIS — E66.01 CLASS 2 SEVERE OBESITY DUE TO EXCESS CALORIES WITH SERIOUS COMORBIDITY IN ADULT, UNSPECIFIED BMI (HCC): ICD-10-CM

## 2018-11-20 DIAGNOSIS — Z00.00 HEALTH CARE MAINTENANCE: Primary | ICD-10-CM

## 2018-11-20 DIAGNOSIS — R60.0 LOCALIZED EDEMA: ICD-10-CM

## 2018-11-20 LAB
25(OH)D3 SERPL-MCNC: 24.7 NG/ML
ALBUMIN SERPL-MCNC: 4.35 G/DL (ref 3.2–4.8)
ALBUMIN/GLOB SERPL: 1.4 G/DL (ref 1.5–2.5)
ALP SERPL-CCNC: 128 U/L (ref 25–100)
ALT SERPL W P-5'-P-CCNC: 7 U/L (ref 7–40)
ANION GAP SERPL CALCULATED.3IONS-SCNC: 3 MMOL/L (ref 3–11)
ARTICHOKE IGE QN: 136 MG/DL (ref 0–130)
AST SERPL-CCNC: 12 U/L (ref 0–33)
BASOPHILS # BLD AUTO: 0.03 10*3/MM3 (ref 0–0.2)
BASOPHILS NFR BLD AUTO: 0.5 % (ref 0–1)
BILIRUB SERPL-MCNC: 0.4 MG/DL (ref 0.3–1.2)
BUN BLD-MCNC: 8 MG/DL (ref 9–23)
BUN/CREAT SERPL: 10 (ref 7–25)
CALCIUM SPEC-SCNC: 11.4 MG/DL (ref 8.7–10.4)
CHLORIDE SERPL-SCNC: 104 MMOL/L (ref 99–109)
CHOLEST SERPL-MCNC: 195 MG/DL (ref 0–200)
CO2 SERPL-SCNC: 29 MMOL/L (ref 20–31)
CREAT BLD-MCNC: 0.8 MG/DL (ref 0.6–1.3)
DEPRECATED RDW RBC AUTO: 50.3 FL (ref 37–54)
EOSINOPHIL # BLD AUTO: 0.36 10*3/MM3 (ref 0–0.3)
EOSINOPHIL NFR BLD AUTO: 5.9 % (ref 0–3)
ERYTHROCYTE [DISTWIDTH] IN BLOOD BY AUTOMATED COUNT: 16 % (ref 11.3–14.5)
FOLATE SERPL-MCNC: 7.18 NG/ML (ref 3.2–20)
GFR SERPL CREATININE-BSD FRML MDRD: 92 ML/MIN/1.73
GLOBULIN UR ELPH-MCNC: 3.2 GM/DL
GLUCOSE BLD-MCNC: 89 MG/DL (ref 70–100)
HCT VFR BLD AUTO: 37.1 % (ref 34.5–44)
HDLC SERPL-MCNC: 45 MG/DL (ref 40–60)
HGB BLD-MCNC: 10.5 G/DL (ref 11.5–15.5)
IMM GRANULOCYTES # BLD: 0.01 10*3/MM3 (ref 0–0.03)
IMM GRANULOCYTES NFR BLD: 0.2 % (ref 0–0.6)
IRON 24H UR-MRATE: 22 MCG/DL (ref 50–175)
LYMPHOCYTES # BLD AUTO: 1.76 10*3/MM3 (ref 0.6–4.8)
LYMPHOCYTES NFR BLD AUTO: 29 % (ref 24–44)
MCH RBC QN AUTO: 24.2 PG (ref 27–31)
MCHC RBC AUTO-ENTMCNC: 28.3 G/DL (ref 32–36)
MCV RBC AUTO: 85.7 FL (ref 80–99)
MONOCYTES # BLD AUTO: 0.44 10*3/MM3 (ref 0–1)
MONOCYTES NFR BLD AUTO: 7.3 % (ref 0–12)
NEUTROPHILS # BLD AUTO: 3.47 10*3/MM3 (ref 1.5–8.3)
NEUTROPHILS NFR BLD AUTO: 57.3 % (ref 41–71)
PLATELET # BLD AUTO: 430 10*3/MM3 (ref 150–450)
PMV BLD AUTO: 10.2 FL (ref 6–12)
POTASSIUM BLD-SCNC: 4.5 MMOL/L (ref 3.5–5.5)
PROT SERPL-MCNC: 7.5 G/DL (ref 5.7–8.2)
RBC # BLD AUTO: 4.33 10*6/MM3 (ref 3.89–5.14)
RETICS/RBC NFR AUTO: 1.47 % (ref 0.5–1.5)
SODIUM BLD-SCNC: 136 MMOL/L (ref 132–146)
TRIGL SERPL-MCNC: 105 MG/DL (ref 0–150)
TSH SERPL DL<=0.05 MIU/L-ACNC: 1.68 MIU/ML (ref 0.35–5.35)
VIT B12 BLD-MCNC: 513 PG/ML (ref 211–911)
WBC NRBC COR # BLD: 6.06 10*3/MM3 (ref 3.5–10.8)

## 2018-11-20 PROCEDURE — 90471 IMMUNIZATION ADMIN: CPT | Performed by: NURSE PRACTITIONER

## 2018-11-20 PROCEDURE — 83540 ASSAY OF IRON: CPT | Performed by: NURSE PRACTITIONER

## 2018-11-20 PROCEDURE — 82306 VITAMIN D 25 HYDROXY: CPT | Performed by: NURSE PRACTITIONER

## 2018-11-20 PROCEDURE — 85045 AUTOMATED RETICULOCYTE COUNT: CPT | Performed by: NURSE PRACTITIONER

## 2018-11-20 PROCEDURE — 82746 ASSAY OF FOLIC ACID SERUM: CPT | Performed by: NURSE PRACTITIONER

## 2018-11-20 PROCEDURE — 90686 IIV4 VACC NO PRSV 0.5 ML IM: CPT | Performed by: NURSE PRACTITIONER

## 2018-11-20 PROCEDURE — 80053 COMPREHEN METABOLIC PANEL: CPT | Performed by: NURSE PRACTITIONER

## 2018-11-20 PROCEDURE — 84443 ASSAY THYROID STIM HORMONE: CPT | Performed by: NURSE PRACTITIONER

## 2018-11-20 PROCEDURE — 80061 LIPID PANEL: CPT | Performed by: NURSE PRACTITIONER

## 2018-11-20 PROCEDURE — 99396 PREV VISIT EST AGE 40-64: CPT | Performed by: NURSE PRACTITIONER

## 2018-11-20 PROCEDURE — 82607 VITAMIN B-12: CPT | Performed by: NURSE PRACTITIONER

## 2018-11-20 PROCEDURE — 85025 COMPLETE CBC W/AUTO DIFF WBC: CPT | Performed by: NURSE PRACTITIONER

## 2018-11-20 NOTE — PROGRESS NOTES
Subjective   Davina Delgado is a 48 y.o. female and is here for a comprehensive physical exam. The patient reports no problems. Patient reports last physical date of     Patient rates their health as good. Describes diet as Well Balanced Diet. Exercises not at all. Employed home-maker. Lives with spouse. Dental exam every 6 months-yes. Brushes teeth twice a day-yes. Vision exam in last 12 months-yes.    Do you take any herbs or supplements that were not prescribed by a doctor? no  Are you taking aspirin daily? yes  Family history of ovarian cancer? no  Family history of breast cancer? no  FH of endometrial cancer? no  FH of cervical cancer? no  FH of colon cancer? yes    Cancer Screening  Mammogram up-to-date?  yes  BMD up-to-date? na  Colonoscopy up-to-date? no      History:  LMP: Patient's last menstrual period was 2018.  Menopause-pre-menopausal  Last pap date:   Abnormal pap? no  : 6  Para: 5  Method of birth control not sexually active    Immunization History  Tdap? yes  HPV? not applicable  Pneumonia? not applicable  Shingles? not applicable    The following portions of the patient's history were reviewed and updated as appropriate: allergies, current medications, past family history, past medical history, past social history, past surgical history and problem list.    Past Medical History:   Diagnosis Date   • Hematuria 2017   • Hyperlipidemia 2016   • Hypertension    • Iron deficiency anemia 2017   • Obesity    • Vitamin D deficiency        Family History   Problem Relation Age of Onset   • Hypertension Mother    • Colon polyps Father    • No Known Problems Sister    • No Known Problems Brother    • No Known Problems Daughter    • No Known Problems Son    • Diabetes Maternal Grandmother    • No Known Problems Paternal Grandmother    • No Known Problems Maternal Aunt    • No Known Problems Paternal Aunt    • BRCA 1/2 Neg Hx    • Breast cancer Neg Hx    • Colon  cancer Neg Hx    • Endometrial cancer Neg Hx    • Ovarian cancer Neg Hx        Past Surgical History:   Procedure Laterality Date   • TUBAL ABDOMINAL LIGATION         Social History     Socioeconomic History   • Marital status:      Spouse name: Not on file   • Number of children: Not on file   • Years of education: Not on file   • Highest education level: Not on file   Social Needs   • Financial resource strain: Not on file   • Food insecurity - worry: Not on file   • Food insecurity - inability: Not on file   • Transportation needs - medical: Not on file   • Transportation needs - non-medical: Not on file   Occupational History   • Not on file   Tobacco Use   • Smoking status: Never Smoker   • Smokeless tobacco: Never Used   Substance and Sexual Activity   • Alcohol use: No   • Drug use: No   • Sexual activity: No   Other Topics Concern   • Not on file   Social History Narrative    PT IS  AND DOES NOT WORK.       Review of Systems  Do you have pain that bothers you in your daily life? yes  Review of Systems   Constitutional: Negative for fatigue, fever and unexpected weight change.   HENT: Negative for congestion, hearing loss, nosebleeds, rhinorrhea, sore throat, trouble swallowing and voice change.    Eyes: Negative for discharge, redness and visual disturbance.   Respiratory: Negative for cough, chest tightness, shortness of breath and wheezing.    Cardiovascular: Negative for chest pain, palpitations and leg swelling.   Gastrointestinal: Positive for constipation. Negative for abdominal pain, blood in stool, diarrhea, nausea and vomiting.   Endocrine: Positive for heat intolerance. Negative for polydipsia, polyphagia and polyuria.   Genitourinary: Negative for dysuria, flank pain, frequency and hematuria.   Musculoskeletal: Positive for arthralgias. Negative for joint swelling and myalgias.   Skin: Negative for color change and rash.   Neurological: Negative for dizziness, seizures, syncope,  weakness and headaches.   Hematological: Negative for adenopathy. Does not bruise/bleed easily.   Psychiatric/Behavioral: Negative for dysphoric mood. The patient is not nervous/anxious.        Objective   Physical Exam   Constitutional: She is oriented to person, place, and time. She appears well-developed and well-nourished. No distress.   HENT:   Head: Normocephalic and atraumatic.   Right Ear: Tympanic membrane and external ear normal.   Left Ear: Tympanic membrane and external ear normal.   Nose: Nose normal.   Mouth/Throat: Oropharynx is clear and moist. No oropharyngeal exudate.   Eyes: Conjunctivae are normal. Pupils are equal, round, and reactive to light. Right eye exhibits no discharge. Left eye exhibits no discharge. No scleral icterus.   Neck: Neck supple. No tracheal deviation present. No thyromegaly present.   Cardiovascular: Normal rate, regular rhythm and normal heart sounds. Exam reveals no gallop and no friction rub.   No murmur heard.  Pulmonary/Chest: Effort normal and breath sounds normal. No respiratory distress. She has no wheezes.   Abdominal: Soft. Bowel sounds are normal. She exhibits no distension and no mass. There is no tenderness.   Musculoskeletal: She exhibits no edema or deformity.   Lymphadenopathy:     She has no cervical adenopathy.   Neurological: She is alert and oriented to person, place, and time. Coordination normal.   Skin: Skin is warm and dry. Capillary refill takes less than 2 seconds. No rash noted. No erythema.   Psychiatric: She has a normal mood and affect. Her speech is normal and behavior is normal. Judgment and thought content normal.   Nursing note and vitals reviewed.       Davina was seen today for annual exam.    Diagnoses and all orders for this visit:    Health care maintenance  -     CBC & Differential  -     Comprehensive Metabolic Panel  -     Lipid Panel  -     TSH  -     Vitamin D 25 Hydroxy  -     Fluarix/Fluzone/Afluria Quad>6 Months  -     CBC Auto  Differential    Essential hypertension    Vitamin D deficiency    Class 2 severe obesity due to excess calories with serious comorbidity in adult, unspecified BMI (CMS/HCC)    Iron deficiency anemia due to chronic blood loss  -     Reticulocytes; Future  -     Iron; Future  -     Vitamin B12; Future  -     Folate; Future  -     Folate  -     Vitamin B12  -     Iron  -     Reticulocytes    Localized edema    Colon cancer screening  -     Ambulatory Referral For Screening Colonoscopy          1.   Problem List Items Addressed This Visit        Cardiovascular and Mediastinum    Hypertension       Digestive    Vitamin D deficiency    Obesity       Hematopoietic and Hemostatic    Iron deficiency anemia       Other    Localized edema      Other Visit Diagnoses     Health care maintenance    -  Primary          1. VIS provided.    2. Patient Counseling:  --Nutrition: Stressed importance of moderation in sodium/caffeine intake, saturated fat and cholesterol, caloric balance, sufficient intake of fresh fruits, vegetables, fiber, calcium, iron, and 1 mg of folate supplement per day (for females capable of pregnancy).  --Exercise: Stressed the importance of exercise 5 times a week  --Injury prevention: Discussed safety belts, safety helmets, smoke detector, smoking near bedding or upholstery.   --Dental health: Discussed importance of regular tooth brushing, flossing, and dental visits.  --Immunizations reviewed.  --Discussed benefits of screening colonoscopy.  --Discussed benefits of screening mammogram.  --After hours service discussed with patient, and appropriate use of the ER.  --Discussed the importance of medication compliance, follow up with me and other health care providers, annual physical, fasting labs, and age appropriate screenings.    3. Discussed the patient's BMI with her.  The BMI is above average; BMI management plan is completed  4. Follow up in one year  5. Discussed the nature of the disease including,  risks, complications, implications, management, safe and proper use of medications. Encouraged therapeutic lifestyle changes including low calorie diet with plenty of fruits and vegetables, daily exercise, medication compliance, and keeping scheduled follow up appointments with me and any other providers. Encouraged patient to have appointment for complete physical, fasting labs, appropriate screenings, and immunizations on an annual basis.

## 2018-11-21 ENCOUNTER — HOSPITAL ENCOUNTER (OUTPATIENT)
Dept: PHYSICAL THERAPY | Facility: HOSPITAL | Age: 48
Setting detail: THERAPIES SERIES
Discharge: HOME | End: 2018-11-21

## 2018-11-21 DIAGNOSIS — M25.562 CHRONIC PAIN OF LEFT KNEE: ICD-10-CM

## 2018-11-21 DIAGNOSIS — M17.12 PRIMARY OSTEOARTHRITIS OF LEFT KNEE: Primary | ICD-10-CM

## 2018-11-21 DIAGNOSIS — G89.29 CHRONIC PAIN OF LEFT KNEE: ICD-10-CM

## 2018-11-21 PROCEDURE — 97110 THERAPEUTIC EXERCISES: CPT | Performed by: PHYSICAL THERAPIST

## 2018-11-21 NOTE — THERAPY DISCHARGE NOTE
Outpatient Physical Therapy Ortho Treatment Note/Discharge Summary   [facility]     Patient Name: Davina Delgado  : 1970  MRN: [id number]  Today's Date: 2018      Visit Date: 2018    Visit Dx:    ICD-10-CM ICD-9-CM   1. Primary osteoarthritis of left knee M17.12 715.16   2. Chronic pain of left knee M25.562 719.46    G89.29 338.29       Patient Active Problem List   Diagnosis   • Hypertension   • Vitamin D deficiency   • Obesity   • Physical exam, annual   • Hyperlipidemia   • Nonintractable episodic headache   • Abnormal uterine and vaginal bleeding, unspecified   • Localized edema   • Abnormal uterine bleeding (AUB)   • Pap smear for cervical cancer screening   • Iron deficiency anemia   • Hematuria   • Urine frequency   • Pain of left calf        Past Medical History:   Diagnosis Date   • Hematuria 2017   • Hyperlipidemia 2016   • Hypertension    • Iron deficiency anemia 2017   • Obesity    • Vitamin D deficiency         Past Surgical History:   Procedure Laterality Date   • TUBAL ABDOMINAL LIGATION       PT Assessment/Plan     Row Name 18 1430          PT Assessment    Assessment Comments  Today is client's last visit.  She has no concerns re: discharge - feels she is independent with HEP.   -LM        PT Plan    PT Plan Comments  D/c  -LM       User Key  (r) = Recorded By, (t) = Taken By, (c) = Cosigned By    Initials Name Provider Type    Noemi Sierra, PT Physical Therapist        Exercises     Row Name 18 1430             Subjective Comments    Subjective Comments  Client states she is hurting today, but thinks its because she has been running errands and cooking all day.  Feels she is ready for today to be her last visit.  Feels comfortable with her HEP at home.  -LM         Subjective Pain    Able to rate subjective pain?  yes  -LM      Pre-Treatment Pain Level  5  -LM      Post-Treatment Pain Level  5  -LM         Total Minutes    58112 - PT  "Therapeutic Exercise Minutes  20  -LM         Exercise 1    Exercise Name 1  Refer to flowsheet for ther ex completed in clinical setting. (Time spent on treadmill not included in total ther ex time).  -LM      Time 1  20  -LM        User Key  (r) = Recorded By, (t) = Taken By, (c) = Cosigned By    Initials Name Provider Type    Noemi Sierra, JAGUAR Physical Therapist        PT OP Goals     Row Name 11/21/18 1430          PT Short Term Goals    STG Date to Achieve  09/27/18  -LM     STG 1  Decrease L knee pain by > or = 50%.  -LM     STG 1 Progress  Met  -LM     STG 2  Perform independent HEP to improve L knee strength/flexibility and manage pain.  -LM     STG 2 Progress  Met  -LM     STG 3  Enable ambulation up/down stairs with < or = 3/10 pain.  -LM     STG 3 Progress  Met Fine going down steps; have to go slow going up steps 1/10  -LM        Long Term Goals    LTG Date to Achieve  10/18/18  -LM     LTG 1  Decrease L knee pain by > or = 75%.  -LM     LTG 1 Progress  Met \"80-89% better\"  -LM     LTG 2  Improve LEFS score to > or = 65/80 to reflect significant improvement in use of LLE for ADLs/functional mobility.  -LM     LTG 2 Progress  Not Met From 58/80 regressed to 53/80  -LM     LTG 3  Enable pain-free ambulation up and down stairs.  -LM     LTG 3 Progress  Not Met 1/10 pain going up steps  -LM        Time Calculation    PT Goal Re-Cert Due Date  12/05/18  -LM       User Key  (r) = Recorded By, (t) = Taken By, (c) = Cosigned By    Initials Name Provider Type    Noemi Sierra, JAGUAR Physical Therapist        Time Calculation:   Start Time: 1430  Therapy Suggested Charges     Code   Minutes Charges    51380 (CPT®) Hc Pt Neuromusc Re Education Ea 15 Min      61659 (CPT®) Hc Pt Ther Proc Ea 15 Min 20 1    76961 (CPT®) Hc Gait Training Ea 15 Min      76543 (CPT®) Hc Pt Therapeutic Act Ea 15 Min      03636 (CPT®) Hc Pt Manual Therapy Ea 15 Min      55189 (CPT®) Hc Pt Ther Massage- Per 15 Min      66859 (CPT®) Hc Pt " Iontophoresis Ea 15 Min      65628 (CPT®) Hc Pt Elec Stim Ea-Per 15 Min      12524 (CPT®) Hc Pt Ultrasound Ea 15 Min      30953 (CPT®) Hc Pt Self Care/Mgmt/Train Ea 15 Min      46731 (CPT®) Hc Pt Prosthetic (S) Train Initial Encounter, Each 15 Min      27006 (CPT®) Hc Orthotic(S) Mgmt/Train Initial Encounter, Each 15min      96380 (CPT®) Hc Pt Aquatic Therapy Ea 15 Min      16554 (CPT®) Hc Pt Orthotic(S)/Prosthetic(S) Encounter, Each 15 Min       (CPT®) Hc Pt Electrical Stim Unattended      Total  20 1        Therapy Charges for Today     Code Description Service Date Service Provider Modifiers Qty    71560439775 HC PT THER PROC EA 15 MIN 11/21/2018 Noemi Maxwell, PT GP 1        OP PT Discharge Summary  Date of Discharge: 11/21/18  Reason for Discharge: other (comment)(Insurance would not approve further visits)  Outcomes Achieved: Refer to plan of care for updates on goals achieved  Discharge Destination: Home with home program  Discharge Instructions/Additional Comments: Prognosis: Good      Noemi Maxwell, PT  11/21/2018

## 2018-11-26 ENCOUNTER — TELEPHONE (OUTPATIENT)
Dept: FAMILY MEDICINE CLINIC | Facility: CLINIC | Age: 48
End: 2018-11-26

## 2018-11-26 NOTE — TELEPHONE ENCOUNTER
----- Message from April CRISTOFER Capps sent at 11/26/2018 12:35 PM EST -----  Contact: PT   PT CALLED WANTING TO KNOW ABOUT HER LAB RESULTS. CALL BACK NUMBER FOR [phone]

## 2018-11-26 NOTE — TELEPHONE ENCOUNTER
Spoke with Pt and informed her of letter that was sent out today, informed her of results and recommendations. Pt verbalized understanding.

## 2018-12-04 ENCOUNTER — PREP FOR SURGERY (OUTPATIENT)
Dept: OTHER | Facility: HOSPITAL | Age: 48
End: 2018-12-04

## 2018-12-04 DIAGNOSIS — Z12.11 SCREEN FOR COLON CANCER: Primary | ICD-10-CM

## 2018-12-05 PROBLEM — Z12.11 SCREEN FOR COLON CANCER: Status: ACTIVE | Noted: 2018-12-05

## 2018-12-06 DIAGNOSIS — Z12.11 SCREENING FOR COLON CANCER: Primary | ICD-10-CM

## 2018-12-06 RX ORDER — PEG-3350, SODIUM SULFATE, SODIUM CHLORIDE, POTASSIUM CHLORIDE, SODIUM ASCORBATE AND ASCORBIC ACID 7.5-2.691G
1000 KIT ORAL ONCE
Qty: 1000 ML | Refills: 0 | Status: SHIPPED | OUTPATIENT
Start: 2018-12-06 | End: 2018-12-06

## 2018-12-12 ENCOUNTER — HOSPITAL ENCOUNTER (OUTPATIENT)
Dept: MAMMOGRAPHY | Facility: HOSPITAL | Age: 48
Discharge: HOME | End: 2018-12-12
Attending: FAMILY MEDICINE | Admitting: FAMILY MEDICINE

## 2018-12-12 DIAGNOSIS — Z12.31 VISIT FOR SCREENING MAMMOGRAM: ICD-10-CM

## 2018-12-12 PROCEDURE — 77067 SCR MAMMO BI INCL CAD: CPT | Performed by: RADIOLOGY

## 2018-12-12 PROCEDURE — 77063 BREAST TOMOSYNTHESIS BI: CPT

## 2018-12-12 PROCEDURE — 77063 BREAST TOMOSYNTHESIS BI: CPT | Performed by: RADIOLOGY

## 2018-12-12 PROCEDURE — 77067 SCR MAMMO BI INCL CAD: CPT

## 2019-02-28 ENCOUNTER — OFFICE VISIT (OUTPATIENT)
Dept: OBSTETRICS AND GYNECOLOGY | Facility: CLINIC | Age: 49
End: 2019-02-28

## 2019-02-28 VITALS
WEIGHT: 293 LBS | SYSTOLIC BLOOD PRESSURE: 128 MMHG | DIASTOLIC BLOOD PRESSURE: 88 MMHG | HEIGHT: 68 IN | BODY MASS INDEX: 44.41 KG/M2

## 2019-02-28 DIAGNOSIS — E66.01 CLASS 2 SEVERE OBESITY DUE TO EXCESS CALORIES WITH SERIOUS COMORBIDITY IN ADULT, UNSPECIFIED BMI (HCC): ICD-10-CM

## 2019-02-28 DIAGNOSIS — Z01.419 WELL WOMAN EXAM WITH ROUTINE GYNECOLOGICAL EXAM: Primary | ICD-10-CM

## 2019-02-28 DIAGNOSIS — D25.9 UTERINE LEIOMYOMA, UNSPECIFIED LOCATION: ICD-10-CM

## 2019-02-28 DIAGNOSIS — N92.6 IRREGULAR PERIODS: ICD-10-CM

## 2019-02-28 PROCEDURE — 99396 PREV VISIT EST AGE 40-64: CPT | Performed by: OBSTETRICS & GYNECOLOGY

## 2019-02-28 NOTE — PROGRESS NOTES
Subjective   Chief Complaint   Patient presents with   • Gynecologic Exam     pt here for annual. last pap 16 negative. last mammo 18 negative. states no c/o     Davina Delgado is a 48 y.o. year old  presenting to be seen for her annual exam.  Patient's periods have improved.  She is not having any abnormal bleeding.  Her cycles are still irregular.  They have become lighter and she only has 1 day of heavy bleeding.  Patient is beginning to have hot flashes but has no other GYN problems.    SEXUAL Hx:  She is not currently sexually active.  In the past year there has not been new sexual partners.    Condoms are not typically used.  She would not like to be screened for STD's at today's exam.  Current birth control method: abstinence.  She is happy with her current method of contraception and does not want to discuss alternative methods of contraception.  MENSTRUAL Hx:  Patient's last menstrual period was 2019 (exact date).  In the past 6 months her cycles have been irregular.  Her menstrual flow is typically moderately heavy.   Each month on average there are roughly 2 day(s) of very heavy flow.    Intermenstrual bleeding is absent.    Post-coital bleeding is absent.  Dysmenorrhea: is not affecting her activities of daily living  PMS: is not affecting her activities of daily living  Her cycles are not a source of concern for her that she wishes to discuss today.  HEALTH Hx:  She exercises regularly:yes.  She wears her seat belt:yes.  She has concerns about domestic violence: no.  OTHER COMPLAINTS:  Nothing else    The following portions of the patient's history were reviewed and updated as appropriate:problem list, current medications, allergies, past family history, past medical history, past social history and past surgical history.    Social History    Tobacco Use      Smoking status: Never Smoker      Smokeless tobacco: Never Used    Review of Systems  Review of Systems - History  "obtained from the patient  General ROS: positive for  - fatigue  Psychological ROS: negative  ENT ROS: negative  Allergy and Immunology ROS: negative  Hematological and Lymphatic ROS: negative  Endocrine ROS: negative  Breast ROS: negative for breast lumps  Current on mammogram  Respiratory ROS: no cough, shortness of breath, or wheezing  Cardiovascular ROS: no chest pain or dyspnea on exertion  Gastrointestinal ROS: no abdominal pain, change in bowel habits, or black or bloody stools  Genito-Urinary ROS: no dysuria, trouble voiding, or hematuria  Musculoskeletal ROS: negative  Neurological ROS: no TIA or stroke symptoms  Dermatological ROS: negative        Objective   /88   Ht 172.7 cm (68\")   Wt (!) 146 kg (322 lb 12.8 oz)   LMP 01/26/2019 (Exact Date)   BMI 49.08 kg/m²      General:  well developed; well nourished  no acute distress   Skin:  No suspicious lesions seen   Thyroid: normal to inspection and palpation   Breasts:  Examined in supine position  Symmetric without masses or skin dimpling  Nipples normal without inversion, lesions or discharge  There are no palpable axillary nodes   Abdomen: soft, non-tender; no masses  no umbilical or inguinal hernias are present  no hepato-splenomegaly   Heart: regular rate and rhythm, S1, S2 normal, no murmur, click, rub or gallop   Lungs: clear to auscultation   Pelvis: Clinical staff was present for exam  External genitalia:  normal appearance of the external genitalia including Bartholin's and [place]'s glands.  :  urethral meatus normal;  Vaginal:  normal pink mucosa without prolapse or lesions.  Cervix:  normal appearance. Pap smear was done  Uterus:  normal size, shape and consistency. Call to tell the size but does not seem markedly enlarged  Adnexa:  normal bimanual exam of the adnexa.  Rectal:  digital rectal exam not performed; anus visually normal appearing.          Assessment/Plan   Davina was seen today for gynecologic exam.    Diagnoses and all " orders for this visit:    Well woman exam with routine gynecological exam  -     Liquid-based Pap Smear, Screening    Uterine leiomyoma, unspecified location    Irregular periods    Class 2 severe obesity due to excess calories with serious comorbidity in adult, unspecified BMI (CMS/HCC)         Return in 1 year  Monitor menstrual cycles    This note was electronically signed.    Chirag Poe MD   February 28, 2019

## 2019-06-17 RX ORDER — HYDROCHLOROTHIAZIDE 12.5 MG/1
CAPSULE, GELATIN COATED ORAL
Qty: 90 CAPSULE | Refills: 0 | Status: SHIPPED | OUTPATIENT
Start: 2019-06-17 | End: 2019-09-15 | Stop reason: SDUPTHER

## 2019-09-15 RX ORDER — HYDROCHLOROTHIAZIDE 12.5 MG/1
CAPSULE, GELATIN COATED ORAL
Qty: 30 CAPSULE | Refills: 2 | Status: SHIPPED | OUTPATIENT
Start: 2019-09-15 | End: 2019-12-10 | Stop reason: SDUPTHER

## 2019-11-06 ENCOUNTER — TRANSCRIBE ORDERS (OUTPATIENT)
Dept: ADMINISTRATIVE | Facility: HOSPITAL | Age: 49
End: 2019-11-06

## 2019-11-06 DIAGNOSIS — Z12.31 VISIT FOR SCREENING MAMMOGRAM: Primary | ICD-10-CM

## 2019-12-10 RX ORDER — HYDROCHLOROTHIAZIDE 12.5 MG/1
CAPSULE, GELATIN COATED ORAL
Qty: 30 CAPSULE | Refills: 2 | Status: SHIPPED | OUTPATIENT
Start: 2019-12-10 | End: 2020-06-05

## 2020-01-17 ENCOUNTER — HOSPITAL ENCOUNTER (OUTPATIENT)
Dept: MAMMOGRAPHY | Facility: HOSPITAL | Age: 50
Discharge: HOME | End: 2020-01-17
Admitting: FAMILY MEDICINE

## 2020-01-17 DIAGNOSIS — Z12.31 VISIT FOR SCREENING MAMMOGRAM: ICD-10-CM

## 2020-01-17 PROCEDURE — 77063 BREAST TOMOSYNTHESIS BI: CPT | Performed by: RADIOLOGY

## 2020-01-17 PROCEDURE — 77067 SCR MAMMO BI INCL CAD: CPT | Performed by: RADIOLOGY

## 2020-01-17 PROCEDURE — 77067 SCR MAMMO BI INCL CAD: CPT

## 2020-01-17 PROCEDURE — 77063 BREAST TOMOSYNTHESIS BI: CPT

## 2020-02-08 RX ORDER — HYDROCHLOROTHIAZIDE 12.5 MG/1
CAPSULE, GELATIN COATED ORAL
Qty: 30 CAPSULE | Refills: 2 | OUTPATIENT
Start: 2020-02-08

## 2020-06-05 ENCOUNTER — OFFICE VISIT (OUTPATIENT)
Dept: OBSTETRICS AND GYNECOLOGY | Facility: CLINIC | Age: 50
End: 2020-06-05

## 2020-06-05 VITALS
BODY MASS INDEX: 44.41 KG/M2 | DIASTOLIC BLOOD PRESSURE: 74 MMHG | HEIGHT: 68 IN | WEIGHT: 293 LBS | RESPIRATION RATE: 14 BRPM | SYSTOLIC BLOOD PRESSURE: 132 MMHG

## 2020-06-05 DIAGNOSIS — Z01.419 WELL WOMAN EXAM WITH ROUTINE GYNECOLOGICAL EXAM: ICD-10-CM

## 2020-06-05 DIAGNOSIS — Z01.419 WELL WOMAN EXAM WITH ROUTINE GYNECOLOGICAL EXAM: Primary | ICD-10-CM

## 2020-06-05 PROCEDURE — 99396 PREV VISIT EST AGE 40-64: CPT | Performed by: OBSTETRICS & GYNECOLOGY

## 2020-06-05 NOTE — PROGRESS NOTES
Subjective   Chief Complaint   Patient presents with   • Gynecologic Exam     Has not had a period in 2 months     Davina Delgado is a 49 y.o. year old  menopausal female presenting to be seen for her annual exam.  This past year she has not been on hormone replacement therapy.  There has been vaginal bleeding in the last 12 months.  Menopausal symptoms are present (hot flashes, night sweats and no energy) but not affecting her quality of life .  Patient presents for an annual exam is really having no GYN problems.  She has a history of uterine fibroids that are asymptomatic.  Her cycles have become irregular but she is having no abnormal bleeding.  Menopause was discussed.  The significance and patterns of abnormal bleeding was discussed and the patient will call if she develops this problem.  She is current on her mammogram.  She has not had a colonoscopy.    SEXUAL Hx:  She is not currently sexually active.  In the past year there has not been new sexual partners.    Condoms are not typically used.  She would not like to be screened for STD's at today's exam.  HEALTH Hx:  She exercises regularly: yes.  She wears her seat belt:yes.  She has concerns about domestic violence: no.  She has noticed changes in height: no.  OTHER COMPLAINTS:  Nothing else    The following portions of the patient's history were reviewed and updated as appropriate:problem list, current medications, allergies, past family history, past medical history, past social history and past surgical history.    Social History    Tobacco Use      Smoking status: Never Smoker      Smokeless tobacco: Never Used      Review of Systems  Review of Systems - History obtained from the patient  General ROS: positive for  - weight loss  Psychological ROS: negative  ENT ROS: negative  Allergy and Immunology ROS: negative  Hematological and Lymphatic ROS: negative  Endocrine ROS: negative  Breast ROS: negative for breast lumps  Respiratory ROS: no  "cough, shortness of breath, or wheezing  Cardiovascular ROS: no chest pain or dyspnea on exertion  Gastrointestinal ROS: no abdominal pain, change in bowel habits, or black or bloody stools  Genito-Urinary ROS: no dysuria, trouble voiding, or hematuria  Musculoskeletal ROS: positive for - joint pain  Neurological ROS: no TIA or stroke symptoms  Dermatological ROS: negative          Objective   /74   Resp 14   Ht 172.7 cm (68\")   Wt (!) 143 kg (315 lb 6.4 oz)   LMP  (LMP Unknown)   Breastfeeding No   BMI 47.96 kg/m²      General:  well developed; well nourished  no acute distress   Skin:  No suspicious lesions seen   Thyroid: normal to inspection and palpation   Breasts:  Examined in supine position  Symmetric without masses or skin dimpling  Nipples normal without inversion, lesions or discharge  There are no palpable axillary nodes   Abdomen: soft, non-tender; no masses  no umbilical or inguinal hernias are present  no hepato-splenomegaly   Heart: regular rate and rhythm, S1, S2 normal, no murmur, click, rub or gallop   Lungs: clear to auscultation   Pelvis: Clinical staff was present for exam  External genitalia:  normal appearance of the external genitalia including Bartholin's and [place]'s glands.  :  urethral meatus normal;  Vaginal:  normal pink mucosa without prolapse or lesions.  Cervix:  normal appearance. Pap smear was done  Uterus:  normal size, shape and consistency. anteverted; Cannot palpate fibroids  Adnexa:  normal bimanual exam of the adnexa.  Rectal:  digital rectal exam not performed; anus visually normal appearing.          Assessment/Plan   Davina was seen today for gynecologic exam.    Diagnoses and all orders for this visit:    Well woman exam with routine gynecological exam  -     Pap IG, Rfx HPV ASCU; Future         Return in 1 year  Chart for tracking cycles and monitoring bleeding was given to the patient  This note was electronically signed.    Chirag Poe MD   June 5, " 2020

## 2020-12-28 ENCOUNTER — TRANSCRIBE ORDERS (OUTPATIENT)
Dept: ADMINISTRATIVE | Facility: HOSPITAL | Age: 50
End: 2020-12-28

## 2020-12-28 DIAGNOSIS — Z12.31 VISIT FOR SCREENING MAMMOGRAM: Primary | ICD-10-CM

## 2021-02-02 ENCOUNTER — TRANSCRIBE ORDERS (OUTPATIENT)
Dept: ADMINISTRATIVE | Facility: HOSPITAL | Age: 51
End: 2021-02-02

## 2021-02-02 DIAGNOSIS — Z12.31 VISIT FOR SCREENING MAMMOGRAM: Primary | ICD-10-CM

## 2021-02-05 ENCOUNTER — HOSPITAL ENCOUNTER (OUTPATIENT)
Dept: MAMMOGRAPHY | Facility: HOSPITAL | Age: 51
Discharge: HOME | End: 2021-02-05
Admitting: FAMILY MEDICINE

## 2021-02-05 DIAGNOSIS — Z12.31 VISIT FOR SCREENING MAMMOGRAM: ICD-10-CM

## 2021-02-05 PROCEDURE — 77067 SCR MAMMO BI INCL CAD: CPT

## 2021-02-05 PROCEDURE — 77067 SCR MAMMO BI INCL CAD: CPT | Performed by: RADIOLOGY

## 2021-02-05 PROCEDURE — 77063 BREAST TOMOSYNTHESIS BI: CPT | Performed by: RADIOLOGY

## 2021-02-05 PROCEDURE — 77063 BREAST TOMOSYNTHESIS BI: CPT

## 2021-02-24 ENCOUNTER — OFFICE VISIT (OUTPATIENT)
Dept: FAMILY MEDICINE CLINIC | Facility: CLINIC | Age: 51
End: 2021-02-24

## 2021-02-24 VITALS
OXYGEN SATURATION: 99 % | TEMPERATURE: 97.1 F | WEIGHT: 293 LBS | SYSTOLIC BLOOD PRESSURE: 140 MMHG | HEIGHT: 68 IN | DIASTOLIC BLOOD PRESSURE: 80 MMHG | BODY MASS INDEX: 44.41 KG/M2 | HEART RATE: 98 BPM

## 2021-02-24 DIAGNOSIS — E55.9 VITAMIN D DEFICIENCY: ICD-10-CM

## 2021-02-24 DIAGNOSIS — Z00.00 WELL ADULT EXAM: Primary | ICD-10-CM

## 2021-02-24 DIAGNOSIS — Z12.11 SCREEN FOR COLON CANCER: ICD-10-CM

## 2021-02-24 DIAGNOSIS — I10 ESSENTIAL HYPERTENSION: ICD-10-CM

## 2021-02-24 DIAGNOSIS — D50.0 IRON DEFICIENCY ANEMIA DUE TO CHRONIC BLOOD LOSS: ICD-10-CM

## 2021-02-24 PROBLEM — D21.9 FIBROIDS: Status: ACTIVE | Noted: 2021-02-24

## 2021-02-24 LAB
25(OH)D3 SERPL-MCNC: 17.3 NG/ML (ref 30–100)
ALBUMIN SERPL-MCNC: 4 G/DL (ref 3.5–5.2)
ALBUMIN/GLOB SERPL: 1.1 G/DL
ALP SERPL-CCNC: 159 U/L (ref 39–117)
ALT SERPL W P-5'-P-CCNC: 7 U/L (ref 1–33)
ANION GAP SERPL CALCULATED.3IONS-SCNC: 7 MMOL/L (ref 5–15)
AST SERPL-CCNC: 12 U/L (ref 1–32)
BASOPHILS # BLD AUTO: 0.04 10*3/MM3 (ref 0–0.2)
BASOPHILS NFR BLD AUTO: 0.7 % (ref 0–1.5)
BILIRUB SERPL-MCNC: 0.2 MG/DL (ref 0–1.2)
BUN SERPL-MCNC: 5 MG/DL (ref 6–20)
BUN/CREAT SERPL: 8.6 (ref 7–25)
CALCIUM SPEC-SCNC: 12 MG/DL (ref 8.6–10.5)
CHLORIDE SERPL-SCNC: 106 MMOL/L (ref 98–107)
CHOLEST SERPL-MCNC: 168 MG/DL (ref 0–200)
CO2 SERPL-SCNC: 23 MMOL/L (ref 22–29)
CREAT SERPL-MCNC: 0.58 MG/DL (ref 0.57–1)
DEPRECATED RDW RBC AUTO: 40.5 FL (ref 37–54)
EOSINOPHIL # BLD AUTO: 0.11 10*3/MM3 (ref 0–0.4)
EOSINOPHIL NFR BLD AUTO: 1.8 % (ref 0.3–6.2)
ERYTHROCYTE [DISTWIDTH] IN BLOOD BY AUTOMATED COUNT: 13 % (ref 12.3–15.4)
FERRITIN SERPL-MCNC: 28.3 NG/ML (ref 13–150)
GFR SERPL CREATININE-BSD FRML MDRD: 133 ML/MIN/1.73
GLOBULIN UR ELPH-MCNC: 3.5 GM/DL
GLUCOSE SERPL-MCNC: 90 MG/DL (ref 65–99)
HBA1C MFR BLD: 5.15 % (ref 4.8–5.6)
HCT VFR BLD AUTO: 37.6 % (ref 34–46.6)
HDLC SERPL-MCNC: 39 MG/DL (ref 40–60)
HGB BLD-MCNC: 11.7 G/DL (ref 12–15.9)
IMM GRANULOCYTES # BLD AUTO: 0.02 10*3/MM3 (ref 0–0.05)
IMM GRANULOCYTES NFR BLD AUTO: 0.3 % (ref 0–0.5)
IRON 24H UR-MRATE: 36 MCG/DL (ref 37–145)
IRON SATN MFR SERPL: 11 % (ref 20–50)
LDLC SERPL CALC-MCNC: 106 MG/DL (ref 0–100)
LDLC/HDLC SERPL: 2.67 {RATIO}
LYMPHOCYTES # BLD AUTO: 1.77 10*3/MM3 (ref 0.7–3.1)
LYMPHOCYTES NFR BLD AUTO: 29.5 % (ref 19.6–45.3)
MCH RBC QN AUTO: 26.5 PG (ref 26.6–33)
MCHC RBC AUTO-ENTMCNC: 31.1 G/DL (ref 31.5–35.7)
MCV RBC AUTO: 85.1 FL (ref 79–97)
MONOCYTES # BLD AUTO: 0.54 10*3/MM3 (ref 0.1–0.9)
MONOCYTES NFR BLD AUTO: 9 % (ref 5–12)
NEUTROPHILS NFR BLD AUTO: 3.53 10*3/MM3 (ref 1.7–7)
NEUTROPHILS NFR BLD AUTO: 58.7 % (ref 42.7–76)
NRBC BLD AUTO-RTO: 0 /100 WBC (ref 0–0.2)
PLATELET # BLD AUTO: 316 10*3/MM3 (ref 140–450)
PMV BLD AUTO: 10.6 FL (ref 6–12)
POTASSIUM SERPL-SCNC: 3.8 MMOL/L (ref 3.5–5.2)
PROT SERPL-MCNC: 7.5 G/DL (ref 6–8.5)
RBC # BLD AUTO: 4.42 10*6/MM3 (ref 3.77–5.28)
SODIUM SERPL-SCNC: 136 MMOL/L (ref 136–145)
TIBC SERPL-MCNC: 338 MCG/DL (ref 298–536)
TRANSFERRIN SERPL-MCNC: 227 MG/DL (ref 200–360)
TRIGL SERPL-MCNC: 125 MG/DL (ref 0–150)
TSH SERPL DL<=0.05 MIU/L-ACNC: 1.28 UIU/ML (ref 0.27–4.2)
VLDLC SERPL-MCNC: 23 MG/DL (ref 5–40)
WBC # BLD AUTO: 6.01 10*3/MM3 (ref 3.4–10.8)

## 2021-02-24 PROCEDURE — 84466 ASSAY OF TRANSFERRIN: CPT | Performed by: FAMILY MEDICINE

## 2021-02-24 PROCEDURE — 82728 ASSAY OF FERRITIN: CPT | Performed by: FAMILY MEDICINE

## 2021-02-24 PROCEDURE — 99396 PREV VISIT EST AGE 40-64: CPT | Performed by: FAMILY MEDICINE

## 2021-02-24 PROCEDURE — 83540 ASSAY OF IRON: CPT | Performed by: FAMILY MEDICINE

## 2021-02-24 PROCEDURE — 85025 COMPLETE CBC W/AUTO DIFF WBC: CPT | Performed by: FAMILY MEDICINE

## 2021-02-24 PROCEDURE — 99213 OFFICE O/P EST LOW 20 MIN: CPT | Performed by: FAMILY MEDICINE

## 2021-02-24 PROCEDURE — 84443 ASSAY THYROID STIM HORMONE: CPT | Performed by: FAMILY MEDICINE

## 2021-02-24 PROCEDURE — 80053 COMPREHEN METABOLIC PANEL: CPT | Performed by: FAMILY MEDICINE

## 2021-02-24 PROCEDURE — 82306 VITAMIN D 25 HYDROXY: CPT | Performed by: FAMILY MEDICINE

## 2021-02-24 PROCEDURE — 83036 HEMOGLOBIN GLYCOSYLATED A1C: CPT | Performed by: FAMILY MEDICINE

## 2021-02-24 PROCEDURE — 80061 LIPID PANEL: CPT | Performed by: FAMILY MEDICINE

## 2021-02-24 NOTE — PROGRESS NOTES
Davina Delgado is a 50 y.o. female who presents today for a well woman exam.    Chief Complaint   Patient presents with   • Establish Care        HPI   Last pap smear was June 2020, results were normal. No history of abnormal pap smears. Patient does admit to having fibroids in the past, but the issue is resolved. No family history of cervical, ovarian, or uterine cancer. Patient had her last menstrual cycle in July 2020. Patient is not sexually active. No vaginal discharge, itching, dysuria, or pelvic pain. Not interested in screening for STIs.  Last mammogram 2/5/21, results were normal. No history of abnormal mammogram. No family history of breast cancer.     Diet is regular. Patient says she's doing great. Patient is doing the weight watchers program through her work, she went her the fitbit and her diet has significantly improving. Physical activity includes 7400 steps of walking every day. Patient is a . Patient also does some caregiving on the side. No sleep problems. No issues falling asleep or staying asleep. Average of 7 hours per night. Last night patient said she didn't get any sleep last night, which is why her blood pressure is probably high. Patient said her fibit says she got one hour of sleep last night due to working night shifts. Patient states she regularly takes her blood pressure at home with the last reading at 118/70.    Patient states she does not like to take pills, but about 2-3 days a week she'll take about 400 mg of ibuprofen for chronic pain in her knees. Patient states she takes vitamin D and a multivitamin every day. Patient did not receive the flu shot this year. Patient plans to schedule colonoscopy this year. Patient denies family history of colon cancer. Patient states she's never had a colonoscopy, but thinks she's done the cologuard before. Patient denies ever smoking. Patient is getting an eye exam this year. Patient states she's never been to the dermatologist.  Patient states she needs to make a dental appointment. Patient has no acute complaints at this time.     No mood problems.     PHQ-2/PHQ-9 Depression Screening 2021   Little interest or pleasure in doing things 0   Feeling down, depressed, or hopeless 0   Total Score 0       Review of Systems   Respiratory: Negative for cough and shortness of breath.    Cardiovascular: Negative for chest pain and palpitations.        Health Maintenance   Topic Date Due   • COLONOSCOPY  1970   • PAP SMEAR  2019   • LIPID PANEL  2019   • ZOSTER VACCINE (1 of 2) 2021 (Originally 2019)   • INFLUENZA VACCINE  2022 (Originally 2020)   • MAMMOGRAM  2022   • TDAP/TD VACCINES (2 - Td) 2024       Obstetric History:  OB History        5    Para   5    Term   5       0    AB   0    Living   3       SAB   0    TAB   0    Ectopic   0    Molar        Multiple   0    Live Births   5               Menstrual History:     Patient's last menstrual period was 2020.         Past Medical History:   Diagnosis Date   • Hematuria 2017   • Hyperlipidemia 2016   • Hypertension    • Iron deficiency anemia 2017   • Obesity    • Vitamin D deficiency         Past Surgical History:   Procedure Laterality Date   • TUBAL ABDOMINAL LIGATION     • WISDOM TOOTH EXTRACTION          Family History   Problem Relation Age of Onset   • Hypertension Mother    • Colon polyps Father    • Lung cancer Father    • No Known Problems Sister    • No Known Problems Daughter    • No Known Problems Son    • Diabetes Maternal Grandmother    • Alzheimer's disease Paternal Grandmother    • Other Maternal Grandfather         unknown   • Other Paternal Grandfather         unknown   • No Known Problems Son    • Other Son         spinal meningitis   • Other Son         meningitis, PNA   • BRCA 1/2 Neg Hx    • Breast cancer Neg Hx    • Colon cancer Neg Hx    • Endometrial cancer Neg Hx    • Ovarian  "cancer Neg Hx    • Uterine cancer Neg Hx         Social History     Socioeconomic History   • Marital status:      Spouse name: Not on file   • Number of children: Not on file   • Years of education: Not on file   • Highest education level: Not on file   Tobacco Use   • Smoking status: Never Smoker   • Smokeless tobacco: Never Used   Substance and Sexual Activity   • Alcohol use: Not Currently     Frequency: Monthly or less     Drinks per session: 1 or 2   • Drug use: No   • Sexual activity: Not Currently     Partners: Male     Comment:  is 85 and they are no longer active   Social History Narrative    PT IS  AND DOES NOT WORK.        Current Outpatient Medications on File Prior to Visit   Medication Sig Dispense Refill   • ibuprofen (ADVIL,MOTRIN) 800 MG tablet Take 1 tablet by mouth 3 (Three) Times a Day With Meals. 15 tablet 0   • MULTIPLE VITAMINS ESSENTIAL PO Take  by mouth.     • Vitamin D, Ergocalciferol, 2000 units capsule Take 2,000 Units by mouth Daily.     • [DISCONTINUED] aspirin 81 MG EC tablet Take 81 mg by mouth Daily.       No current facility-administered medications on file prior to visit.        No Known Allergies     Visit Vitals  /80   Pulse 98   Temp 97.1 °F (36.2 °C)   Ht 172.7 cm (68\")   Wt (!) 146 kg (322 lb 3.2 oz)   LMP 07/24/2020   SpO2 99%   BMI 48.99 kg/m²        Physical Exam  Constitutional:       General: She is not in acute distress.     Appearance: She is well-developed. She is not diaphoretic.   HENT:      Head: Atraumatic.   Neck:      Musculoskeletal: Normal range of motion and neck supple.   Cardiovascular:      Rate and Rhythm: Normal rate and regular rhythm.      Heart sounds: Normal heart sounds. No murmur. No friction rub. No gallop.    Pulmonary:      Effort: Pulmonary effort is normal. No respiratory distress.      Breath sounds: Normal breath sounds. No stridor. No wheezing, rhonchi or rales.   Skin:     General: Skin is warm and dry. "   Neurological:      Mental Status: She is alert and oriented to person, place, and time.   Psychiatric:         Behavior: Behavior normal.          Results for orders placed or performed in visit on 11/20/18   Comprehensive Metabolic Panel    Specimen: Arm, Left; Blood   Result Value Ref Range    Glucose 89 70 - 100 mg/dL    BUN 8 (L) 9 - 23 mg/dL    Creatinine 0.80 0.60 - 1.30 mg/dL    Sodium 136 132 - 146 mmol/L    Potassium 4.5 3.5 - 5.5 mmol/L    Chloride 104 99 - 109 mmol/L    CO2 29.0 20.0 - 31.0 mmol/L    Calcium 11.4 (H) 8.7 - 10.4 mg/dL    Total Protein 7.5 5.7 - 8.2 g/dL    Albumin 4.35 3.20 - 4.80 g/dL    ALT (SGPT) 7 7 - 40 U/L    AST (SGOT) 12 0 - 33 U/L    Alkaline Phosphatase 128 (H) 25 - 100 U/L    Total Bilirubin 0.4 0.3 - 1.2 mg/dL    eGFR  African Amer 92 >60 mL/min/1.73    Globulin 3.2 gm/dL    A/G Ratio 1.4 (L) 1.5 - 2.5 g/dL    BUN/Creatinine Ratio 10.0 7.0 - 25.0    Anion Gap 3.0 3.0 - 11.0 mmol/L   Lipid Panel    Specimen: Arm, Left; Blood   Result Value Ref Range    Total Cholesterol 195 0 - 200 mg/dL    Triglycerides 105 0 - 150 mg/dL    HDL Cholesterol 45 40 - 60 mg/dL    LDL Cholesterol  136 (H) 0 - 130 mg/dL   TSH    Specimen: Arm, Left; Blood   Result Value Ref Range    TSH 1.676 0.350 - 5.350 mIU/mL   Vitamin D 25 Hydroxy    Specimen: Arm, Left; Blood   Result Value Ref Range    25 Hydroxy, Vitamin D 24.7 ng/ml   Folate    Specimen: Arm, Left; Blood   Result Value Ref Range    Folate 7.18 3.20 - 20.00 ng/mL   Vitamin B12    Specimen: Arm, Left; Blood   Result Value Ref Range    Vitamin B-12 513 211 - 911 pg/mL   Iron    Specimen: Arm, Left; Blood   Result Value Ref Range    Iron 22 (L) 50 - 175 mcg/dL   Reticulocytes    Specimen: Arm, Left; Blood   Result Value Ref Range    Reticulocyte % 1.47 0.50 - 1.50 %   CBC Auto Differential    Specimen: Arm, Left; Blood   Result Value Ref Range    WBC 6.06 3.50 - 10.80 10*3/mm3    RBC 4.33 3.89 - 5.14 10*6/mm3    Hemoglobin 10.5 (L) 11.5 - 15.5  g/dL    Hematocrit 37.1 34.5 - 44.0 %    MCV 85.7 80.0 - 99.0 fL    MCH 24.2 (L) 27.0 - 31.0 pg    MCHC 28.3 (L) 32.0 - 36.0 g/dL    RDW 16.0 (H) 11.3 - 14.5 %    RDW-SD 50.3 37.0 - 54.0 fl    MPV 10.2 6.0 - 12.0 fL    Platelets 430 150 - 450 10*3/mm3    Neutrophil % 57.3 41.0 - 71.0 %    Lymphocyte % 29.0 24.0 - 44.0 %    Monocyte % 7.3 0.0 - 12.0 %    Eosinophil % 5.9 (H) 0.0 - 3.0 %    Basophil % 0.5 0.0 - 1.0 %    Immature Grans % 0.2 0.0 - 0.6 %    Neutrophils, Absolute 3.47 1.50 - 8.30 10*3/mm3    Lymphocytes, Absolute 1.76 0.60 - 4.80 10*3/mm3    Monocytes, Absolute 0.44 0.00 - 1.00 10*3/mm3    Eosinophils, Absolute 0.36 (H) 0.00 - 0.30 10*3/mm3    Basophils, Absolute 0.03 0.00 - 0.20 10*3/mm3    Immature Grans, Absolute 0.01 0.00 - 0.03 10*3/mm3        Immunization History   Administered Date(s) Administered   • Flu Vaccine Intradermal Quad 18-64YR 11/16/2019   • Flu Vaccine Quad PF >18YRS 10/12/2017   • Flu Vaccine Quad PF >36MO 10/12/2017, 11/20/2018   • Flulaval/Fluarix/Fluzone Quad 11/20/2018   • Influenza Quad Vaccine (Inpatient) 10/12/2017   • flucelvax quad pfs =>4 YRS 11/16/2019       Problems Addressed this Visit        Cardiac and Vasculature    Hypertension     Hypertension is worsening.  Dietary sodium restriction.  Weight loss.  Regular aerobic exercise.  Ambulatory blood pressure monitoring.  Patient will follow up in 6-month if blood pressure is not return to being controlled start medication at that time.  Blood pressure will be reassessed 6 months.            Endocrine and Metabolic    Vitamin D deficiency     Patient will continue 2000 units/day of vitamin D supplementation.  We will recheck vitamin D levels today.         Relevant Orders    Vitamin D 25 Hydroxy       Gastrointestinal Abdominal     Screen for colon cancer    Relevant Orders    Ambulatory Referral For Screening Colonoscopy       Health Encounters    Well adult exam - Primary     The patient is here for health maintenance  visit.  Currently, the patient consumes a healthy diet and has an inadequate exercise regimen.  Screening lab work is ordered.  Immunizations were reviewed today.  Advice and education was given regarding nutrition, aerobic exercise, routine dental evaluations, routine eye exams, reproductive health, cardiovascular risk reduction, sunscreen use, self skin examination (annual dermatology evaluations) and seatbelt use (general overall safety).  Further recommendations will be given if needed after lab evaluation.  Annual wellness evaluation is recommended.           Relevant Orders    CBC & Differential    Comprehensive Metabolic Panel    Hemoglobin A1c    Lipid Panel    TSH Rfx On Abnormal To Free T4    CBC Auto Differential       Hematology and Neoplasia    Iron deficiency anemia     Patient is taking multivitamin and iron supplementation.  We will recheck iron levels and CBC today.         Relevant Orders    Ferritin    Iron Profile      Diagnoses       Codes Comments    Well adult exam    -  Primary ICD-10-CM: Z00.00  ICD-9-CM: V70.0     Vitamin D deficiency     ICD-10-CM: E55.9  ICD-9-CM: 268.9     Essential hypertension     ICD-10-CM: I10  ICD-9-CM: 401.9     Iron deficiency anemia due to chronic blood loss     ICD-10-CM: D50.0  ICD-9-CM: 280.0     Screen for colon cancer     ICD-10-CM: Z12.11  ICD-9-CM: V76.51           Patient's Body mass index is 48.99 kg/m².      Return in about 6 months (around 8/24/2021) for Follow-up HTN, weight loss.    Parts of this office note have been dictated by voice recognition software. Grammatical and/or spelling errors may be present.     Nils Spence MD  2/24/2021   Patient/Caregiver provided printed discharge information.

## 2021-02-24 NOTE — ASSESSMENT & PLAN NOTE
Hypertension is worsening.  Dietary sodium restriction.  Weight loss.  Regular aerobic exercise.  Ambulatory blood pressure monitoring.  Patient will follow up in 6-month if blood pressure is not return to being controlled start medication at that time.  Blood pressure will be reassessed 6 months.

## 2021-02-24 NOTE — ASSESSMENT & PLAN NOTE
Patient is taking multivitamin and iron supplementation.  We will recheck iron levels and CBC today.

## 2021-02-24 NOTE — ASSESSMENT & PLAN NOTE
Patient will continue 2000 units/day of vitamin D supplementation.  We will recheck vitamin D levels today.

## 2021-03-01 ENCOUNTER — TELEPHONE (OUTPATIENT)
Dept: FAMILY MEDICINE CLINIC | Facility: CLINIC | Age: 51
End: 2021-03-01

## 2021-03-01 NOTE — TELEPHONE ENCOUNTER
Patient called and stated that she was told that Dr. Spence wants to talk to her regarding her lab results.  Patient stated that a voicemail can be left because she just got off work and she's going to bed.    Patient callback: [phone]    Please advise

## 2021-03-01 NOTE — TELEPHONE ENCOUNTER
Attempted to contact her by phone but was unable to reach her.  Can you contact her and ask her what is a good time for me to call to be able to speak with her?

## 2021-03-02 ENCOUNTER — TELEPHONE (OUTPATIENT)
Dept: FAMILY MEDICINE CLINIC | Facility: CLINIC | Age: 51
End: 2021-03-02

## 2021-03-02 NOTE — TELEPHONE ENCOUNTER
Caller: LIBBY     Relationship: SELF    Best call back number:  [phone]    Caller requesting test results: PATIENT    What test was performed: LABS    When was the test performed:     Where was the test performed: [facility]

## 2021-03-17 ENCOUNTER — TELEPHONE (OUTPATIENT)
Dept: FAMILY MEDICINE CLINIC | Facility: CLINIC | Age: 51
End: 2021-03-17

## 2021-03-17 NOTE — TELEPHONE ENCOUNTER
jesse    Caller: Davina Delgado    Relationship: Self    Best call back number :[phone]  Caller requesting test results: YES    What test was performed: LAB    When was the test performed: 02/24    Where was the test performed: IN OFFICE    Additional notes: PATIENT REQUESTING A COPY OF RESULTS MAILED TO HER AT    [address] [place] [address] Dr [place] [address] [place], [place] [id number]

## 2021-03-29 ENCOUNTER — APPOINTMENT (OUTPATIENT)
Dept: PREADMISSION TESTING | Facility: HOSPITAL | Age: 51
End: 2021-03-29

## 2021-06-10 ENCOUNTER — OFFICE VISIT (OUTPATIENT)
Dept: OBSTETRICS AND GYNECOLOGY | Facility: CLINIC | Age: 51
End: 2021-06-10

## 2021-06-10 VITALS
SYSTOLIC BLOOD PRESSURE: 122 MMHG | DIASTOLIC BLOOD PRESSURE: 90 MMHG | WEIGHT: 293 LBS | HEIGHT: 68 IN | BODY MASS INDEX: 44.41 KG/M2 | RESPIRATION RATE: 14 BRPM

## 2021-06-10 DIAGNOSIS — Z01.419 WELL WOMAN EXAM WITH ROUTINE GYNECOLOGICAL EXAM: ICD-10-CM

## 2021-06-10 DIAGNOSIS — Z01.419 WELL WOMAN EXAM WITH ROUTINE GYNECOLOGICAL EXAM: Primary | ICD-10-CM

## 2021-06-10 PROCEDURE — 99396 PREV VISIT EST AGE 40-64: CPT | Performed by: OBSTETRICS & GYNECOLOGY

## 2021-06-10 NOTE — PROGRESS NOTES
Subjective   Chief Complaint   Patient presents with   • Gynecologic Exam     Patient has only had 2 periods this year, her first period was in Feb and her 2nd period was on 2021     Davina Delgado is a 50 y.o. year old  presenting to be seen for her annual exam.  Patient is now beginning to have irregular periods.  She has had only 2 cycles this year.  She has had no abnormal bleeding.  A menstrual chart was given to monitor bleeding.  Patient is not complaining any GYN problems.  She has a history of uterine fibroids.  Patient is exercising and trying to lose weight.  She is followed by primary care and reports her blood pressure goes up and down which she relates to stress.  She is on no medications at this time.  She is current on her mammogram.  She has not had a colonoscopy.  Adult Visits - 40 to 64 Years - Counseling/Anticipatory Guidance: Nutrition, physical activity, healthy weight, injury prevention, misuse of tobacco, alcohol and drugs, sexual behavior and STDs, contraception, dental health, mental health, immunizations, screenings for women: Breast cancer and self breast exams.     SEXUAL Hx:  She is not currently sexually active.  In the past year there has not been new sexual partners.    Condoms are not typically used.  She would not like to be screened for STD's at today's exam.  Current birth control method: abstinence.  She is happy with her current method of contraception and does not want to discuss alternative methods of contraception.  MENSTRUAL Hx:  Patient's last menstrual period was 2021 (exact date).  In the past 6 months her cycles have been irregular.  Her menstrual flow is normal.   Each month on average there are roughly 2 day(s) of very heavy flow.    Intermenstrual bleeding is absent.    Post-coital bleeding is absent.  Dysmenorrhea: none  PMS: none  Her cycles are not a source of concern for her that she wishes to discuss today.  HEALTH Hx:  She exercises  "regularly:yes.  She wears her seat belt:yes.  She has concerns about domestic violence: no.  OTHER COMPLAINTS:  Nothing else    The following portions of the patient's history were reviewed and updated as appropriate:problem list, current medications, allergies, past family history, past medical history, past social history and past surgical history.    Social History    Tobacco Use      Smoking status: Never Smoker      Smokeless tobacco: Never Used    Review of Systems  Review of Systems - History obtained from the patient  General ROS: positive for  - weight loss  Psychological ROS: negative  ENT ROS: negative  Allergy and Immunology ROS: positive for - seasonal allergies  Hematological and Lymphatic ROS: negative  Endocrine ROS: negative  Breast ROS: negative for breast lumps  Current on mammogram  Respiratory ROS: no cough, shortness of breath, or wheezing  Cardiovascular ROS: no chest pain or dyspnea on exertion  Gastrointestinal ROS: no abdominal pain, change in bowel habits, or black or bloody stools  Needs to schedule appointment with gastroenterologist  Genito-Urinary ROS: no dysuria, trouble voiding, or hematuria  Musculoskeletal ROS: positive for - joint pain  Neurological ROS: no TIA or stroke symptoms  Dermatological ROS: negative        Objective   /90 (BP Location: Right arm, Patient Position: Sitting, Cuff Size: Large Adult)   Resp 14   Ht 172.7 cm (68\")   Wt (!) 141 kg (310 lb)   LMP 04/08/2021 (Exact Date)   Breastfeeding No   BMI 47.14 kg/m²     General:  well developed; well nourished  no acute distress   Skin:  No suspicious lesions seen   Thyroid: not examined   Breasts:  Examined in supine position  Symmetric without masses or skin dimpling  Nipples normal without inversion, lesions or discharge  There are no palpable axillary nodes   Abdomen: soft, non-tender; no masses  no umbilical or inguinal hernias are present  no hepato-splenomegaly   Heart: regular rate and rhythm, S1, S2 " normal, no murmur, click, rub or gallop   Lungs: clear to auscultation   Pelvis: Clinical staff was present for exam  External genitalia:  normal appearance of the external genitalia including Bartholin's and Pinson's glands.  :  urethral meatus normal;  Vaginal:  normal pink mucosa without prolapse or lesions.  Cervix:  normal appearance. Pap smear was done  Uterus:  normal size, shape and consistency. anteverted;  Adnexa:  normal bimanual exam of the adnexa.  Rectal:  digital rectal exam not performed; anus visually normal appearing.          Assessment/Plan   Diagnoses and all orders for this visit:    1. Well woman exam with routine gynecological exam (Primary)  -     Pap IG, Rfx HPV ASCU; Future          Return in 1 year if no problems with bleeding  Monitor menstrual cycles and call if abnormal bleeding develops    This note was electronically signed.    Chirag Poe MD   [place] 10, 2021

## 2021-11-08 ENCOUNTER — TRANSCRIBE ORDERS (OUTPATIENT)
Dept: ADMINISTRATIVE | Facility: HOSPITAL | Age: 51
End: 2021-11-08

## 2021-11-08 DIAGNOSIS — Z12.31 VISIT FOR SCREENING MAMMOGRAM: Primary | ICD-10-CM

## 2022-02-23 ENCOUNTER — OFFICE VISIT (OUTPATIENT)
Dept: FAMILY MEDICINE CLINIC | Facility: CLINIC | Age: 52
End: 2022-02-23

## 2022-02-23 ENCOUNTER — LAB (OUTPATIENT)
Dept: LAB | Facility: HOSPITAL | Age: 52
End: 2022-02-23

## 2022-02-23 VITALS
HEIGHT: 68 IN | DIASTOLIC BLOOD PRESSURE: 90 MMHG | SYSTOLIC BLOOD PRESSURE: 130 MMHG | BODY MASS INDEX: 38.8 KG/M2 | WEIGHT: 256 LBS | TEMPERATURE: 97.8 F | OXYGEN SATURATION: 99 % | HEART RATE: 82 BPM

## 2022-02-23 DIAGNOSIS — E78.2 MIXED HYPERLIPIDEMIA: ICD-10-CM

## 2022-02-23 DIAGNOSIS — R63.4 WEIGHT LOSS, UNINTENTIONAL: ICD-10-CM

## 2022-02-23 DIAGNOSIS — Z00.00 WELL ADULT EXAM: Primary | ICD-10-CM

## 2022-02-23 DIAGNOSIS — D50.9 IRON DEFICIENCY ANEMIA, UNSPECIFIED IRON DEFICIENCY ANEMIA TYPE: ICD-10-CM

## 2022-02-23 DIAGNOSIS — R74.8 ELEVATED ALKALINE PHOSPHATASE LEVEL: ICD-10-CM

## 2022-02-23 DIAGNOSIS — Z12.11 SCREEN FOR COLON CANCER: ICD-10-CM

## 2022-02-23 DIAGNOSIS — K92.1 MELENA: ICD-10-CM

## 2022-02-23 DIAGNOSIS — E55.9 VITAMIN D DEFICIENCY: ICD-10-CM

## 2022-02-23 DIAGNOSIS — R31.29 MICROSCOPIC HEMATURIA: ICD-10-CM

## 2022-02-23 DIAGNOSIS — R13.10 DYSPHAGIA, UNSPECIFIED TYPE: ICD-10-CM

## 2022-02-23 DIAGNOSIS — R10.10 PAIN OF UPPER ABDOMEN: ICD-10-CM

## 2022-02-23 DIAGNOSIS — I10 PRIMARY HYPERTENSION: ICD-10-CM

## 2022-02-23 DIAGNOSIS — R11.2 NON-INTRACTABLE VOMITING WITH NAUSEA, UNSPECIFIED VOMITING TYPE: ICD-10-CM

## 2022-02-23 PROBLEM — R11.10 NON-INTRACTABLE VOMITING: Status: ACTIVE | Noted: 2022-02-23

## 2022-02-23 LAB
BACTERIA UR QL AUTO: ABNORMAL /HPF
BASOPHILS # BLD AUTO: 0.04 10*3/MM3 (ref 0–0.2)
BASOPHILS NFR BLD AUTO: 0.6 % (ref 0–1.5)
BILIRUB UR QL STRIP: NEGATIVE
CHOLEST SERPL-MCNC: 178 MG/DL (ref 0–200)
CLARITY UR: CLEAR
COLOR UR: YELLOW
DEPRECATED RDW RBC AUTO: 38.9 FL (ref 37–54)
EOSINOPHIL # BLD AUTO: 0.14 10*3/MM3 (ref 0–0.4)
EOSINOPHIL NFR BLD AUTO: 2.2 % (ref 0.3–6.2)
ERYTHROCYTE [DISTWIDTH] IN BLOOD BY AUTOMATED COUNT: 11.9 % (ref 12.3–15.4)
GGT SERPL-CCNC: 30 U/L (ref 5–36)
GLUCOSE UR STRIP-MCNC: NEGATIVE MG/DL
HBA1C MFR BLD: 4.7 % (ref 4.8–5.6)
HCT VFR BLD AUTO: 42 % (ref 34–46.6)
HDLC SERPL-MCNC: 45 MG/DL (ref 40–60)
HGB BLD-MCNC: 13.1 G/DL (ref 12–15.9)
HGB UR QL STRIP.AUTO: ABNORMAL
HYALINE CASTS UR QL AUTO: ABNORMAL /LPF
IMM GRANULOCYTES # BLD AUTO: 0.02 10*3/MM3 (ref 0–0.05)
IMM GRANULOCYTES NFR BLD AUTO: 0.3 % (ref 0–0.5)
KETONES UR QL STRIP: NEGATIVE
LDLC SERPL CALC-MCNC: 109 MG/DL (ref 0–100)
LDLC/HDLC SERPL: 2.35 {RATIO}
LEUKOCYTE ESTERASE UR QL STRIP.AUTO: ABNORMAL
LYMPHOCYTES # BLD AUTO: 2.37 10*3/MM3 (ref 0.7–3.1)
LYMPHOCYTES NFR BLD AUTO: 37.6 % (ref 19.6–45.3)
MCH RBC QN AUTO: 27.7 PG (ref 26.6–33)
MCHC RBC AUTO-ENTMCNC: 31.2 G/DL (ref 31.5–35.7)
MCV RBC AUTO: 88.8 FL (ref 79–97)
MONOCYTES # BLD AUTO: 0.55 10*3/MM3 (ref 0.1–0.9)
MONOCYTES NFR BLD AUTO: 8.7 % (ref 5–12)
NEUTROPHILS NFR BLD AUTO: 3.19 10*3/MM3 (ref 1.7–7)
NEUTROPHILS NFR BLD AUTO: 50.6 % (ref 42.7–76)
NITRITE UR QL STRIP: NEGATIVE
NRBC BLD AUTO-RTO: 0 /100 WBC (ref 0–0.2)
PH UR STRIP.AUTO: 7.5 [PH] (ref 5–8)
PLATELET # BLD AUTO: 378 10*3/MM3 (ref 140–450)
PMV BLD AUTO: 10.9 FL (ref 6–12)
PROT UR QL STRIP: NEGATIVE
RBC # BLD AUTO: 4.73 10*6/MM3 (ref 3.77–5.28)
RBC # UR STRIP: ABNORMAL /HPF
REF LAB TEST METHOD: ABNORMAL
RETICS # AUTO: 0.06 10*6/MM3 (ref 0.02–0.13)
RETICS/RBC NFR AUTO: 1.27 % (ref 0.7–1.9)
SP GR UR STRIP: <1.005 (ref 1–1.03)
SQUAMOUS #/AREA URNS HPF: ABNORMAL /HPF
TRIGL SERPL-MCNC: 137 MG/DL (ref 0–150)
TSH SERPL DL<=0.05 MIU/L-ACNC: 1.17 UIU/ML (ref 0.27–4.2)
UROBILINOGEN UR QL STRIP: ABNORMAL
VLDLC SERPL-MCNC: 24 MG/DL (ref 5–40)
WBC # UR STRIP: ABNORMAL /HPF
WBC NRBC COR # BLD: 6.31 10*3/MM3 (ref 3.4–10.8)
YEAST URNS QL MICRO: ABNORMAL /HPF

## 2022-02-23 PROCEDURE — 82306 VITAMIN D 25 HYDROXY: CPT | Performed by: FAMILY MEDICINE

## 2022-02-23 PROCEDURE — 83036 HEMOGLOBIN GLYCOSYLATED A1C: CPT | Performed by: FAMILY MEDICINE

## 2022-02-23 PROCEDURE — 81001 URINALYSIS AUTO W/SCOPE: CPT | Performed by: FAMILY MEDICINE

## 2022-02-23 PROCEDURE — 99396 PREV VISIT EST AGE 40-64: CPT | Performed by: FAMILY MEDICINE

## 2022-02-23 PROCEDURE — 82977 ASSAY OF GGT: CPT | Performed by: FAMILY MEDICINE

## 2022-02-23 PROCEDURE — 99214 OFFICE O/P EST MOD 30 MIN: CPT | Performed by: FAMILY MEDICINE

## 2022-02-23 PROCEDURE — 85045 AUTOMATED RETICULOCYTE COUNT: CPT | Performed by: FAMILY MEDICINE

## 2022-02-23 PROCEDURE — 80050 GENERAL HEALTH PANEL: CPT | Performed by: FAMILY MEDICINE

## 2022-02-23 PROCEDURE — 80061 LIPID PANEL: CPT | Performed by: FAMILY MEDICINE

## 2022-02-23 RX ORDER — ONDANSETRON 4 MG/1
4 TABLET, FILM COATED ORAL EVERY 8 HOURS PRN
Qty: 30 TABLET | Refills: 2 | Status: SHIPPED | OUTPATIENT
Start: 2022-02-23 | End: 2022-08-25

## 2022-02-23 NOTE — ASSESSMENT & PLAN NOTE
Patient has chronic microscopic hematuria.  We will recheck urine today and if hematuria still present will refer to urology for further evaluation and treatment.

## 2022-02-23 NOTE — ASSESSMENT & PLAN NOTE
Patient has been unable to take iron supplementation due to nausea and vomiting she has not been taking any medications.  Will work-up nausea, vomiting, weight loss further and restart iron supplementation when nausea and vomiting symptoms resolved.

## 2022-02-23 NOTE — ASSESSMENT & PLAN NOTE
Patient was given prescription for Zofran for symptomatic relief while awaiting gastroenterology referral.

## 2022-02-23 NOTE — PROGRESS NOTES
Davina Delgado is a 51 y.o. female who presents today for a well woman exam.    Chief Complaint   Patient presents with   • Annual Exam   • Nausea   • Weight Loss        Last pap June 10th, results were normal. No history of abnormal pap smears. No family history of cervical, ovarian, or uterine cancer.     Not currently sexually active.  No vaginal discharge, itching, dysuria, or pelvic pain. Not interested in screening for STIs.     Last mammogram 1 year ago, results were normal. No history of abnormal mammogram. No family history of breast cancer.     Diet - see below under nausea.    Not currently exercising d/t cold temps.    Has been sleeping 10 hours per night since onset of nausea in December.    No mood problems.     Nausea  This is a new problem. The current episode started more than 1 month ago. The problem occurs daily. The problem has been gradually worsening. Associated symptoms include anorexia, arthralgias (L knee), a change in bowel habit (decreased s/s to not eating. will take miralax occasionally. says she has noticed some changes to the color of her stools (some dark some orange)), chest pain (chest soreness x3 weeks ago.  may have injured herself moving), fatigue, nausea, vomiting (Vomiting multiple times per day every day.) and weakness. Pertinent negatives include no abdominal pain, chills, congestion, coughing, diaphoresis, fever, headaches, joint swelling, myalgias, neck pain, numbness, rash, sore throat, swollen glands, urinary symptoms, vertigo or visual change. The symptoms are aggravated by eating (smell of food and eating food makes her nauseous). She has tried drinking (says cold ice and water helps minimally) for the symptoms. The treatment provided no relief (She reports that she isn't trying to eat d/t fear of vomiting.  Doesn't feel hungry.  Mainly eating broths because she can keep it down.).   Weight Loss  Associated symptoms include anorexia, arthralgias (L knee), a change  in bowel habit (decreased s/s to not eating. will take miralax occasionally. says she has noticed some changes to the color of her stools (some dark some orange)), chest pain (chest soreness x3 weeks ago.  may have injured herself moving), fatigue, nausea, vomiting (Vomiting multiple times per day every day.) and weakness. Pertinent negatives include no abdominal pain, chills, congestion, coughing, diaphoresis, fever, headaches, joint swelling, myalgias, neck pain, numbness, rash, sore throat, swollen glands, urinary symptoms, vertigo or visual change.        PHQ-2/PHQ-9 Depression Screening 2/23/2022   Little interest or pleasure in doing things 0   Feeling down, depressed, or hopeless 0   Total Score 0       Review of Systems   Constitutional: Positive for appetite change, fatigue and unexpected weight loss. Negative for chills, diaphoresis and fever.   HENT: Positive for trouble swallowing (states she feels like there is phlegm blocking her from swallowing). Negative for congestion, ear pain, postnasal drip, sinus pressure, sore throat and swollen glands.    Eyes: Negative for blurred vision, double vision and pain.   Respiratory: Negative for cough, chest tightness, shortness of breath and wheezing.    Cardiovascular: Positive for chest pain (chest soreness x3 weeks ago.  may have injured herself moving). Negative for palpitations and leg swelling.   Gastrointestinal: Positive for anorexia, change in bowel habit (decreased s/s to not eating. will take miralax occasionally. says she has noticed some changes to the color of her stools (some dark some orange)), nausea, vomiting (Vomiting multiple times per day every day.) and indigestion. Negative for abdominal pain, anal bleeding, blood in stool, constipation, diarrhea, rectal pain and GERD.   Endocrine: Negative for polydipsia, polyphagia and polyuria.   Genitourinary: Positive for hematuria (says she has hx of UA w/ trace RBC's.  reports urine looked darker 2  days ago). Negative for decreased urine volume, difficulty urinating, dysuria, flank pain, pelvic pain, pelvic pressure and urgency.   Musculoskeletal: Positive for arthralgias (L knee). Negative for joint swelling, myalgias and neck pain.   Skin: Negative for pallor, rash, skin lesions and wound.   Allergic/Immunologic: Negative for environmental allergies.   Neurological: Positive for weakness. Negative for dizziness, vertigo, light-headedness, numbness, headache and confusion.   Psychiatric/Behavioral: Negative for depressed mood. The patient is not nervous/anxious.         Health Maintenance   Topic Date Due   • ZOSTER VACCINE (1 of 2) Never done   • PAP SMEAR  2019   • INFLUENZA VACCINE  2021   • MAMMOGRAM  2022   • LIPID PANEL  2022   • TDAP/TD VACCINES (2 - Td or Tdap) 2024       Obstetric History:  OB History        5    Para   5    Term   5       0    AB   0    Living   3       SAB   0    IAB   0    Ectopic   0    Molar        Multiple   0    Live Births   5               Menstrual History:     Patient's last menstrual period was 2021.         Past Medical History:   Diagnosis Date   • Hematuria 2017   • Hyperlipidemia 2016   • Hypertension    • Iron deficiency anemia 2017   • Obesity    • Vitamin D deficiency         Past Surgical History:   Procedure Laterality Date   • TUBAL ABDOMINAL LIGATION     • WISDOM TOOTH EXTRACTION          Family History   Problem Relation Age of Onset   • Hypertension Mother    • Colon polyps Father    • Lung cancer Father    • No Known Problems Sister    • No Known Problems Daughter    • No Known Problems Son    • Diabetes Maternal Grandmother    • Alzheimer's disease Paternal Grandmother    • Other Maternal Grandfather         unknown   • Other Paternal Grandfather         unknown   • No Known Problems Son    • Other Son         spinal meningitis   • Other Son         meningitis, PNA   • BRCA 1/2 Neg Hx    •  "Breast cancer Neg Hx    • Colon cancer Neg Hx    • Endometrial cancer Neg Hx    • Ovarian cancer Neg Hx    • Uterine cancer Neg Hx         Social History     Socioeconomic History   • Marital status:    Tobacco Use   • Smoking status: Never Smoker   • Smokeless tobacco: Never Used   Vaping Use   • Vaping Use: Never used   Substance and Sexual Activity   • Alcohol use: Not Currently   • Drug use: No   • Sexual activity: Not Currently     Partners: Male     Comment:  is 85 and they are no longer active        Current Outpatient Medications on File Prior to Visit   Medication Sig Dispense Refill   • ibuprofen (ADVIL,MOTRIN) 800 MG tablet Take 1 tablet by mouth 3 (Three) Times a Day With Meals. 15 tablet 0   • MULTIPLE VITAMINS ESSENTIAL PO Take  by mouth.     • Vitamin D, Ergocalciferol, 2000 units capsule Take 2,000 Units by mouth Daily.       No current facility-administered medications on file prior to visit.       No Known Allergies     Visit Vitals  /90   Pulse 82   Temp 97.8 °F (36.6 °C)   Ht 172.7 cm (68\")   Wt 116 kg (256 lb)   LMP 03/30/2021   SpO2 99%   BMI 38.92 kg/m²        Physical Exam  Constitutional:       Appearance: Normal appearance.   HENT:      Head: Normocephalic and atraumatic.      Right Ear: Tympanic membrane normal. There is no impacted cerumen.      Left Ear: Tympanic membrane normal. There is no impacted cerumen.      Nose: Nose normal. No congestion or rhinorrhea.      Mouth/Throat:      Mouth: Mucous membranes are moist.      Pharynx: Oropharynx is clear. No oropharyngeal exudate or posterior oropharyngeal erythema.   Eyes:      General: No scleral icterus.     Extraocular Movements: Extraocular movements intact.      Conjunctiva/sclera: Conjunctivae normal.      Pupils: Pupils are equal, round, and reactive to light.   Cardiovascular:      Rate and Rhythm: Normal rate and regular rhythm.      Pulses: Normal pulses.      Heart sounds: Normal heart sounds. No murmur " heard.  No friction rub. No gallop.    Pulmonary:      Effort: Pulmonary effort is normal. No respiratory distress.      Breath sounds: Normal breath sounds. No stridor. No wheezing, rhonchi or rales.   Abdominal:      General: Abdomen is flat. Bowel sounds are normal. There is no distension.      Palpations: There is no mass.      Tenderness: There is abdominal tenderness (RUQ and LUQ ). There is no guarding or rebound.      Hernia: No hernia is present.   Musculoskeletal:         General: No swelling or deformity. Normal range of motion.      Cervical back: Normal range of motion and neck supple. No rigidity.   Lymphadenopathy:      Cervical: No cervical adenopathy.   Skin:     General: Skin is warm.      Capillary Refill: Capillary refill takes less than 2 seconds.      Coloration: Skin is not jaundiced.      Findings: No bruising or erythema.   Neurological:      General: No focal deficit present.      Mental Status: She is alert and oriented to person, place, and time.      Cranial Nerves: No cranial nerve deficit.      Motor: No weakness.   Psychiatric:         Mood and Affect: Mood normal.         Behavior: Behavior normal.          Immunization History   Administered Date(s) Administered   • Flu Vaccine Intradermal Quad 18-64YR 11/16/2019   • Flu Vaccine Quad PF >18YRS 10/12/2017   • Flu Vaccine Quad PF >36MO 10/12/2017, 11/20/2018   • FluLaval/Fluarix/Fluzone >6 11/20/2018   • Influenza Quad Vaccine (Inpatient) 10/12/2017   • flucelvax quad pfs =>4 YRS 11/16/2019       Problems Addressed this Visit        Cardiac and Vasculature    Hypertension    Relevant Orders    CBC & Differential    Comprehensive Metabolic Panel    Hemoglobin A1c    Lipid Panel    Hyperlipidemia    Relevant Orders    CBC & Differential    Comprehensive Metabolic Panel    Lipid Panel       Endocrine and Metabolic    Vitamin D deficiency    Relevant Orders    Vitamin D 25 Hydroxy    Weight loss, unintentional     Unintentional weight  loss in the setting of abdominal pain, melena, dysphagia, nausea, and vomiting are concerning for possible malignancy versus other gastrointestinal causes.  Will order labs to evaluate further and patient was given referral to gastroenterology for further evaluation and treatment.  She will likely need endoscopy as well as colonoscopy.  Will defer further testing and imaging to gastroenterology.  Strong RTC/ED precautions given.         Relevant Orders    CBC & Differential    Comprehensive Metabolic Panel    Hemoglobin A1c    Lipid Panel    TSH Rfx On Abnormal To Free T4    Urinalysis With Microscopic - Urine, Clean Catch    Vitamin D 25 Hydroxy    Ambulatory Referral to Gastroenterology       Gastrointestinal Abdominal     Screen for colon cancer    Non-intractable vomiting     Patient was given prescription for Zofran for symptomatic relief while awaiting gastroenterology referral.         Relevant Medications    ondansetron (Zofran) 4 MG tablet    Other Relevant Orders    CBC & Differential    Comprehensive Metabolic Panel    Hemoglobin A1c    Lipid Panel    TSH Rfx On Abnormal To Free T4    Urinalysis With Microscopic - Urine, Clean Catch    Vitamin D 25 Hydroxy    Dysphagia    Relevant Orders    CBC & Differential    Comprehensive Metabolic Panel    Hemoglobin A1c    Lipid Panel    TSH Rfx On Abnormal To Free T4    Urinalysis With Microscopic - Urine, Clean Catch    Vitamin D 25 Hydroxy    Pain of upper abdomen    Relevant Orders    CBC & Differential    Comprehensive Metabolic Panel    Hemoglobin A1c    Lipid Panel    TSH Rfx On Abnormal To Free T4    Urinalysis With Microscopic - Urine, Clean Catch    Vitamin D 25 Hydroxy    Melena    Relevant Orders    CBC & Differential    Ambulatory Referral to Gastroenterology       Genitourinary and Reproductive     Microscopic hematuria     Patient has chronic microscopic hematuria.  We will recheck urine today and if hematuria still present will refer to urology for  further evaluation and treatment.         Relevant Orders    Urinalysis With Microscopic - Urine, Clean Catch       Health Encounters    Well adult exam - Primary     The patient is here for health maintenance visit.  Currently, the patient consumes a healthy diet and has an inadequate exercise regimen.  Screening lab work is ordered.  Immunizations were reviewed today.  Advice and education was given regarding nutrition, aerobic exercise, routine dental evaluations, routine eye exams, reproductive health, cardiovascular risk reduction, sunscreen use, self skin examination (annual dermatology evaluations) and seatbelt use (general overall safety).  Further recommendations will be given if needed after lab evaluation.  Annual wellness evaluation is recommended.           Relevant Orders    CBC & Differential    Comprehensive Metabolic Panel    Hemoglobin A1c    Lipid Panel    TSH Rfx On Abnormal To Free T4    Urinalysis With Microscopic - Urine, Clean Catch       Hematology and Neoplasia    Iron deficiency anemia     Patient has been unable to take iron supplementation due to nausea and vomiting she has not been taking any medications.  Will work-up nausea, vomiting, weight loss further and restart iron supplementation when nausea and vomiting symptoms resolved.         Relevant Orders    CBC & Differential    Reticulocytes       Symptoms and Signs    Elevated alkaline phosphatase level    Relevant Orders    Comprehensive Metabolic Panel    Ambulatory Referral to Gastroenterology    Gamma GT      Diagnoses       Codes Comments    Well adult exam    -  Primary ICD-10-CM: Z00.00  ICD-9-CM: V70.0     Vitamin D deficiency     ICD-10-CM: E55.9  ICD-9-CM: 268.9     Mixed hyperlipidemia     ICD-10-CM: E78.2  ICD-9-CM: 272.2     Primary hypertension     ICD-10-CM: I10  ICD-9-CM: 401.9     Screen for colon cancer     ICD-10-CM: Z12.11  ICD-9-CM: V76.51     Microscopic hematuria     ICD-10-CM: R31.29  ICD-9-CM: 599.72      Non-intractable vomiting with nausea, unspecified vomiting type     ICD-10-CM: R11.2  ICD-9-CM: 787.01     Dysphagia, unspecified type     ICD-10-CM: R13.10  ICD-9-CM: 787.20     Pain of upper abdomen     ICD-10-CM: R10.10  ICD-9-CM: 789.09     Weight loss, unintentional     ICD-10-CM: R63.4  ICD-9-CM: 783.21     Elevated alkaline phosphatase level     ICD-10-CM: R74.8  ICD-9-CM: 790.5     Melena     ICD-10-CM: K92.1  ICD-9-CM: 578.1     Iron deficiency anemia, unspecified iron deficiency anemia type     ICD-10-CM: D50.9  ICD-9-CM: 280.9           Patient's Body mass index is 38.92 kg/m².      Return in about 3 months (around 5/23/2022) for Follow-up anemia, HTN, HLD.    I attest that I have reviewed the student note and that the components of the history of the present illness, the physical exam, and the assessment and plan documented were performed by me or were performed in my presence by the student and verified by me.    Nils Spence MD  2/23/2022

## 2022-02-23 NOTE — ASSESSMENT & PLAN NOTE
Unintentional weight loss in the setting of abdominal pain, melena, dysphagia, nausea, and vomiting are concerning for possible malignancy versus other gastrointestinal causes.  Will order labs to evaluate further and patient was given referral to gastroenterology for further evaluation and treatment.  She will likely need endoscopy as well as colonoscopy.  Will defer further testing and imaging to gastroenterology.  Strong RTC/ED precautions given.

## 2022-02-24 ENCOUNTER — TELEPHONE (OUTPATIENT)
Dept: FAMILY MEDICINE CLINIC | Facility: CLINIC | Age: 52
End: 2022-02-24

## 2022-02-24 ENCOUNTER — APPOINTMENT (OUTPATIENT)
Dept: GENERAL RADIOLOGY | Facility: HOSPITAL | Age: 52
End: 2022-02-24

## 2022-02-24 ENCOUNTER — HOSPITAL ENCOUNTER (INPATIENT)
Facility: HOSPITAL | Age: 52
LOS: 6 days | Discharge: HOME | End: 2022-03-02
Attending: EMERGENCY MEDICINE | Admitting: INTERNAL MEDICINE

## 2022-02-24 ENCOUNTER — APPOINTMENT (OUTPATIENT)
Dept: CT IMAGING | Facility: HOSPITAL | Age: 52
End: 2022-02-24

## 2022-02-24 DIAGNOSIS — R63.4 UNINTENTIONAL WEIGHT LOSS: ICD-10-CM

## 2022-02-24 DIAGNOSIS — E87.6 HYPOKALEMIA: ICD-10-CM

## 2022-02-24 DIAGNOSIS — R53.1 GENERALIZED WEAKNESS: ICD-10-CM

## 2022-02-24 DIAGNOSIS — E21.3 HYPERPARATHYROIDISM: ICD-10-CM

## 2022-02-24 DIAGNOSIS — E83.52 HYPERCALCEMIA: Primary | ICD-10-CM

## 2022-02-24 LAB
25(OH)D3 SERPL-MCNC: 20.4 NG/ML (ref 30–100)
ALBUMIN SERPL-MCNC: 3.5 G/DL (ref 3.5–5.2)
ALBUMIN SERPL-MCNC: 4 G/DL (ref 3.5–5.2)
ALBUMIN SERPL-MCNC: 4.3 G/DL (ref 3.5–5.2)
ALBUMIN/GLOB SERPL: 1 G/DL
ALP SERPL-CCNC: 214 U/L (ref 39–117)
ALP SERPL-CCNC: 251 U/L (ref 39–117)
ALP SERPL-CCNC: 253 U/L (ref 39–117)
ALT SERPL W P-5'-P-CCNC: 11 U/L (ref 1–33)
ALT SERPL W P-5'-P-CCNC: 12 U/L (ref 1–33)
ALT SERPL W P-5'-P-CCNC: 13 U/L (ref 1–33)
ANION GAP SERPL CALCULATED.3IONS-SCNC: 12 MMOL/L (ref 5–15)
ANION GAP SERPL CALCULATED.3IONS-SCNC: 9 MMOL/L (ref 5–15)
ANION GAP SERPL CALCULATED.3IONS-SCNC: 9 MMOL/L (ref 5–15)
AST SERPL-CCNC: 21 U/L (ref 1–32)
AST SERPL-CCNC: 21 U/L (ref 1–32)
AST SERPL-CCNC: 22 U/L (ref 1–32)
BACTERIA UR QL AUTO: ABNORMAL /HPF
BASOPHILS # BLD AUTO: 0.04 10*3/MM3 (ref 0–0.2)
BASOPHILS NFR BLD AUTO: 0.8 % (ref 0–1.5)
BILIRUB SERPL-MCNC: 0.6 MG/DL (ref 0–1.2)
BILIRUB SERPL-MCNC: 0.9 MG/DL (ref 0–1.2)
BILIRUB SERPL-MCNC: 1 MG/DL (ref 0–1.2)
BILIRUB UR QL STRIP: NEGATIVE
BUN SERPL-MCNC: 3 MG/DL (ref 6–20)
BUN/CREAT SERPL: 3.2 (ref 7–25)
BUN/CREAT SERPL: 3.7 (ref 7–25)
BUN/CREAT SERPL: 3.8 (ref 7–25)
CA-I SERPL ISE-MCNC: 2.17 MMOL/L (ref 1.12–1.32)
CA-I SERPL ISE-MCNC: 2.38 MMOL/L (ref 1.12–1.32)
CALCIUM SPEC-SCNC: 14.8 MG/DL (ref 8.6–10.5)
CALCIUM SPEC-SCNC: 16.7 MG/DL (ref 8.6–10.5)
CALCIUM SPEC-SCNC: 16.8 MG/DL (ref 8.6–10.5)
CHLORIDE SERPL-SCNC: 101 MMOL/L (ref 98–107)
CHLORIDE SERPL-SCNC: 108 MMOL/L (ref 98–107)
CHLORIDE SERPL-SCNC: 95 MMOL/L (ref 98–107)
CLARITY UR: CLEAR
CO2 SERPL-SCNC: 25 MMOL/L (ref 22–29)
CO2 SERPL-SCNC: 30 MMOL/L (ref 22–29)
CO2 SERPL-SCNC: 30 MMOL/L (ref 22–29)
COLOR UR: YELLOW
CREAT SERPL-MCNC: 0.79 MG/DL (ref 0.57–1)
CREAT SERPL-MCNC: 0.81 MG/DL (ref 0.57–1)
CREAT SERPL-MCNC: 0.95 MG/DL (ref 0.57–1)
DEPRECATED RDW RBC AUTO: 38.5 FL (ref 37–54)
EOSINOPHIL # BLD AUTO: 0.18 10*3/MM3 (ref 0–0.4)
EOSINOPHIL NFR BLD AUTO: 3.5 % (ref 0.3–6.2)
ERYTHROCYTE [DISTWIDTH] IN BLOOD BY AUTOMATED COUNT: 12.3 % (ref 12.3–15.4)
FLUAV RNA RESP QL NAA+PROBE: NOT DETECTED
FLUBV RNA RESP QL NAA+PROBE: NOT DETECTED
GFR SERPL CREATININE-BSD FRML MDRD: 75 ML/MIN/1.73
GFR SERPL CREATININE-BSD FRML MDRD: 90 ML/MIN/1.73
GFR SERPL CREATININE-BSD FRML MDRD: 93 ML/MIN/1.73
GLOBULIN UR ELPH-MCNC: 3.5 GM/DL
GLOBULIN UR ELPH-MCNC: 4 GM/DL
GLOBULIN UR ELPH-MCNC: 4.2 GM/DL
GLUCOSE SERPL-MCNC: 104 MG/DL (ref 65–99)
GLUCOSE SERPL-MCNC: 114 MG/DL (ref 65–99)
GLUCOSE SERPL-MCNC: 72 MG/DL (ref 65–99)
GLUCOSE UR STRIP-MCNC: NEGATIVE MG/DL
HCT VFR BLD AUTO: 38.2 % (ref 34–46.6)
HGB BLD-MCNC: 12.3 G/DL (ref 12–15.9)
HGB UR QL STRIP.AUTO: NEGATIVE
HYALINE CASTS UR QL AUTO: ABNORMAL /LPF
IMM GRANULOCYTES # BLD AUTO: 0.01 10*3/MM3 (ref 0–0.05)
IMM GRANULOCYTES NFR BLD AUTO: 0.2 % (ref 0–0.5)
KETONES UR QL STRIP: NEGATIVE
LEUKOCYTE ESTERASE UR QL STRIP.AUTO: ABNORMAL
LYMPHOCYTES # BLD AUTO: 1.84 10*3/MM3 (ref 0.7–3.1)
LYMPHOCYTES NFR BLD AUTO: 36 % (ref 19.6–45.3)
MAGNESIUM SERPL-MCNC: 1.4 MG/DL (ref 1.6–2.6)
MAGNESIUM SERPL-MCNC: 1.6 MG/DL (ref 1.6–2.6)
MCH RBC QN AUTO: 27.7 PG (ref 26.6–33)
MCHC RBC AUTO-ENTMCNC: 32.2 G/DL (ref 31.5–35.7)
MCV RBC AUTO: 86 FL (ref 79–97)
MONOCYTES # BLD AUTO: 0.51 10*3/MM3 (ref 0.1–0.9)
MONOCYTES NFR BLD AUTO: 10 % (ref 5–12)
NEUTROPHILS NFR BLD AUTO: 2.53 10*3/MM3 (ref 1.7–7)
NEUTROPHILS NFR BLD AUTO: 49.5 % (ref 42.7–76)
NITRITE UR QL STRIP: NEGATIVE
NRBC BLD AUTO-RTO: 0 /100 WBC (ref 0–0.2)
PH UR STRIP.AUTO: 7.5 [PH] (ref 5–8)
PLATELET # BLD AUTO: 373 10*3/MM3 (ref 140–450)
PMV BLD AUTO: 9.9 FL (ref 6–12)
POTASSIUM SERPL-SCNC: 2.8 MMOL/L (ref 3.5–5.2)
POTASSIUM SERPL-SCNC: 2.9 MMOL/L (ref 3.5–5.2)
POTASSIUM SERPL-SCNC: 3 MMOL/L (ref 3.5–5.2)
PROT SERPL-MCNC: 7 G/DL (ref 6–8.5)
PROT SERPL-MCNC: 8 G/DL (ref 6–8.5)
PROT SERPL-MCNC: 8.5 G/DL (ref 6–8.5)
PROT UR QL STRIP: NEGATIVE
PTH-INTACT SERPL-MCNC: 712.2 PG/ML (ref 15–65)
QT INTERVAL: 380 MS
QTC INTERVAL: 438 MS
RBC # BLD AUTO: 4.44 10*6/MM3 (ref 3.77–5.28)
RBC # UR STRIP: ABNORMAL /HPF
REF LAB TEST METHOD: ABNORMAL
SARS-COV-2 RNA RESP QL NAA+PROBE: NOT DETECTED
SODIUM SERPL-SCNC: 137 MMOL/L (ref 136–145)
SODIUM SERPL-SCNC: 140 MMOL/L (ref 136–145)
SODIUM SERPL-SCNC: 142 MMOL/L (ref 136–145)
SP GR UR STRIP: <=1.005 (ref 1–1.03)
SQUAMOUS #/AREA URNS HPF: ABNORMAL /HPF
T4 FREE SERPL-MCNC: 0.93 NG/DL (ref 0.93–1.7)
TSH SERPL DL<=0.05 MIU/L-ACNC: 1.12 UIU/ML (ref 0.27–4.2)
UROBILINOGEN UR QL STRIP: ABNORMAL
WBC # UR STRIP: ABNORMAL /HPF
WBC NRBC COR # BLD: 5.11 10*3/MM3 (ref 3.4–10.8)

## 2022-02-24 PROCEDURE — 86334 IMMUNOFIX E-PHORESIS SERUM: CPT | Performed by: INTERNAL MEDICINE

## 2022-02-24 PROCEDURE — 82784 ASSAY IGA/IGD/IGG/IGM EACH: CPT | Performed by: INTERNAL MEDICINE

## 2022-02-24 PROCEDURE — 80053 COMPREHEN METABOLIC PANEL: CPT | Performed by: NURSE PRACTITIONER

## 2022-02-24 PROCEDURE — 82306 VITAMIN D 25 HYDROXY: CPT | Performed by: FAMILY MEDICINE

## 2022-02-24 PROCEDURE — 83521 IG LIGHT CHAINS FREE EACH: CPT | Performed by: INTERNAL MEDICINE

## 2022-02-24 PROCEDURE — 71250 CT THORAX DX C-: CPT

## 2022-02-24 PROCEDURE — 99284 EMERGENCY DEPT VISIT MOD MDM: CPT

## 2022-02-24 PROCEDURE — 83970 ASSAY OF PARATHORMONE: CPT | Performed by: FAMILY MEDICINE

## 2022-02-24 PROCEDURE — 99223 1ST HOSP IP/OBS HIGH 75: CPT | Performed by: FAMILY MEDICINE

## 2022-02-24 PROCEDURE — 71045 X-RAY EXAM CHEST 1 VIEW: CPT

## 2022-02-24 PROCEDURE — 83735 ASSAY OF MAGNESIUM: CPT | Performed by: EMERGENCY MEDICINE

## 2022-02-24 PROCEDURE — 82330 ASSAY OF CALCIUM: CPT | Performed by: EMERGENCY MEDICINE

## 2022-02-24 PROCEDURE — 74176 CT ABD & PELVIS W/O CONTRAST: CPT

## 2022-02-24 PROCEDURE — 63710000001 CALCITONIN PER 400 UNITS: Performed by: INTERNAL MEDICINE

## 2022-02-24 PROCEDURE — 25010000002 MAGNESIUM SULFATE 2 GM/50ML SOLUTION: Performed by: FAMILY MEDICINE

## 2022-02-24 PROCEDURE — 87636 SARSCOV2 & INF A&B AMP PRB: CPT | Performed by: EMERGENCY MEDICINE

## 2022-02-24 PROCEDURE — 84165 PROTEIN E-PHORESIS SERUM: CPT | Performed by: INTERNAL MEDICINE

## 2022-02-24 PROCEDURE — 80050 GENERAL HEALTH PANEL: CPT | Performed by: EMERGENCY MEDICINE

## 2022-02-24 PROCEDURE — 81001 URINALYSIS AUTO W/SCOPE: CPT | Performed by: EMERGENCY MEDICINE

## 2022-02-24 PROCEDURE — 25010000002 PAMIDRONATE DISODIUM PER 30 MG: Performed by: FAMILY MEDICINE

## 2022-02-24 PROCEDURE — 82164 ANGIOTENSIN I ENZYME TEST: CPT | Performed by: INTERNAL MEDICINE

## 2022-02-24 PROCEDURE — 25010000002 SODIUM CHLORIDE 0.9 % WITH KCL 20 MEQ 20-0.9 MEQ/L-% SOLUTION: Performed by: FAMILY MEDICINE

## 2022-02-24 PROCEDURE — 82330 ASSAY OF CALCIUM: CPT | Performed by: NURSE PRACTITIONER

## 2022-02-24 PROCEDURE — 0 POTASSIUM CHLORIDE 10 MEQ/100ML SOLUTION: Performed by: EMERGENCY MEDICINE

## 2022-02-24 PROCEDURE — 93005 ELECTROCARDIOGRAM TRACING: CPT | Performed by: EMERGENCY MEDICINE

## 2022-02-24 PROCEDURE — 84439 ASSAY OF FREE THYROXINE: CPT | Performed by: EMERGENCY MEDICINE

## 2022-02-24 PROCEDURE — 82397 CHEMILUMINESCENT ASSAY: CPT | Performed by: FAMILY MEDICINE

## 2022-02-24 PROCEDURE — 83735 ASSAY OF MAGNESIUM: CPT | Performed by: NURSE PRACTITIONER

## 2022-02-24 PROCEDURE — 87086 URINE CULTURE/COLONY COUNT: CPT | Performed by: EMERGENCY MEDICINE

## 2022-02-24 RX ORDER — ONDANSETRON 2 MG/ML
4 INJECTION INTRAMUSCULAR; INTRAVENOUS EVERY 6 HOURS PRN
Status: DISCONTINUED | OUTPATIENT
Start: 2022-02-24 | End: 2022-02-28 | Stop reason: SDUPTHER

## 2022-02-24 RX ORDER — MAGNESIUM SULFATE HEPTAHYDRATE 40 MG/ML
2 INJECTION, SOLUTION INTRAVENOUS AS NEEDED
Status: DISCONTINUED | OUTPATIENT
Start: 2022-02-24 | End: 2022-03-02 | Stop reason: HOSPADM

## 2022-02-24 RX ORDER — POTASSIUM CHLORIDE 750 MG/1
20 CAPSULE, EXTENDED RELEASE ORAL ONCE
Status: COMPLETED | OUTPATIENT
Start: 2022-02-24 | End: 2022-02-24

## 2022-02-24 RX ORDER — SODIUM CHLORIDE 0.9 % (FLUSH) 0.9 %
10 SYRINGE (ML) INJECTION EVERY 12 HOURS SCHEDULED
Status: DISCONTINUED | OUTPATIENT
Start: 2022-02-24 | End: 2022-03-02 | Stop reason: HOSPADM

## 2022-02-24 RX ORDER — ACETAMINOPHEN 325 MG/1
650 TABLET ORAL EVERY 4 HOURS PRN
Status: DISCONTINUED | OUTPATIENT
Start: 2022-02-24 | End: 2022-03-02 | Stop reason: HOSPADM

## 2022-02-24 RX ORDER — POTASSIUM CHLORIDE 7.45 MG/ML
10 INJECTION INTRAVENOUS
Status: DISCONTINUED | OUTPATIENT
Start: 2022-02-24 | End: 2022-03-02 | Stop reason: HOSPADM

## 2022-02-24 RX ORDER — ONDANSETRON 4 MG/1
4 TABLET, FILM COATED ORAL EVERY 6 HOURS PRN
Status: DISCONTINUED | OUTPATIENT
Start: 2022-02-24 | End: 2022-02-28 | Stop reason: SDUPTHER

## 2022-02-24 RX ORDER — SODIUM CHLORIDE AND POTASSIUM CHLORIDE 150; 900 MG/100ML; MG/100ML
125 INJECTION, SOLUTION INTRAVENOUS CONTINUOUS
Status: DISCONTINUED | OUTPATIENT
Start: 2022-02-24 | End: 2022-02-26

## 2022-02-24 RX ORDER — CALCITONIN SALMON 200 [USP'U]/ML
4 INJECTION, SOLUTION INTRAMUSCULAR; SUBCUTANEOUS 3 TIMES DAILY
Status: COMPLETED | OUTPATIENT
Start: 2022-02-24 | End: 2022-02-25

## 2022-02-24 RX ORDER — POTASSIUM CHLORIDE 1.5 G/1.77G
40 POWDER, FOR SOLUTION ORAL AS NEEDED
Status: DISCONTINUED | OUTPATIENT
Start: 2022-02-24 | End: 2022-03-02 | Stop reason: HOSPADM

## 2022-02-24 RX ORDER — SODIUM CHLORIDE 0.9 % (FLUSH) 0.9 %
10 SYRINGE (ML) INJECTION AS NEEDED
Status: DISCONTINUED | OUTPATIENT
Start: 2022-02-24 | End: 2022-03-02 | Stop reason: HOSPADM

## 2022-02-24 RX ORDER — KETOROLAC TROMETHAMINE 15 MG/ML
15 INJECTION, SOLUTION INTRAMUSCULAR; INTRAVENOUS ONCE
Status: DISCONTINUED | OUTPATIENT
Start: 2022-02-24 | End: 2022-02-24

## 2022-02-24 RX ORDER — POTASSIUM CHLORIDE 750 MG/1
40 CAPSULE, EXTENDED RELEASE ORAL AS NEEDED
Status: DISCONTINUED | OUTPATIENT
Start: 2022-02-24 | End: 2022-03-02 | Stop reason: HOSPADM

## 2022-02-24 RX ORDER — ACETAMINOPHEN 650 MG/1
650 SUPPOSITORY RECTAL EVERY 4 HOURS PRN
Status: DISCONTINUED | OUTPATIENT
Start: 2022-02-24 | End: 2022-03-02 | Stop reason: HOSPADM

## 2022-02-24 RX ORDER — ACETAMINOPHEN 160 MG/5ML
650 SOLUTION ORAL EVERY 4 HOURS PRN
Status: DISCONTINUED | OUTPATIENT
Start: 2022-02-24 | End: 2022-03-02 | Stop reason: HOSPADM

## 2022-02-24 RX ORDER — LISINOPRIL 20 MG/1
20 TABLET ORAL
Status: DISCONTINUED | OUTPATIENT
Start: 2022-02-24 | End: 2022-03-02 | Stop reason: HOSPADM

## 2022-02-24 RX ORDER — MAGNESIUM SULFATE HEPTAHYDRATE 40 MG/ML
4 INJECTION, SOLUTION INTRAVENOUS AS NEEDED
Status: DISCONTINUED | OUTPATIENT
Start: 2022-02-24 | End: 2022-03-02 | Stop reason: HOSPADM

## 2022-02-24 RX ORDER — POTASSIUM CHLORIDE 7.45 MG/ML
10 INJECTION INTRAVENOUS ONCE
Status: COMPLETED | OUTPATIENT
Start: 2022-02-24 | End: 2022-02-24

## 2022-02-24 RX ADMIN — CALCITONIN SALMON 452 UNITS: 200 INJECTION, SOLUTION INTRAMUSCULAR; SUBCUTANEOUS at 20:45

## 2022-02-24 RX ADMIN — SODIUM CHLORIDE 500 ML: 9 INJECTION, SOLUTION INTRAVENOUS at 10:49

## 2022-02-24 RX ADMIN — POTASSIUM CHLORIDE 20 MEQ: 10 CAPSULE, COATED, EXTENDED RELEASE ORAL at 12:19

## 2022-02-24 RX ADMIN — CALCITONIN SALMON 452 UNITS: 200 INJECTION, SOLUTION INTRAMUSCULAR; SUBCUTANEOUS at 16:10

## 2022-02-24 RX ADMIN — MAGNESIUM SULFATE HEPTAHYDRATE 2 G: 40 INJECTION, SOLUTION INTRAVENOUS at 23:19

## 2022-02-24 RX ADMIN — Medication 10 ML: at 20:50

## 2022-02-24 RX ADMIN — LISINOPRIL 20 MG: 20 TABLET ORAL at 16:11

## 2022-02-24 RX ADMIN — POTASSIUM CHLORIDE 10 MEQ: 7.46 INJECTION, SOLUTION INTRAVENOUS at 12:19

## 2022-02-24 RX ADMIN — PAMIDRONATE DISODIUM 90 MG: 3 INJECTION, SOLUTION INTRAVENOUS at 16:11

## 2022-02-24 RX ADMIN — POTASSIUM CHLORIDE AND SODIUM CHLORIDE 75 ML/HR: 900; 150 INJECTION, SOLUTION INTRAVENOUS at 15:02

## 2022-02-24 NOTE — TELEPHONE ENCOUNTER
Called pt , advised her that she needs to go to the ED ASAP due to abnormal labs , she is on her way to [facility] ED now.

## 2022-02-25 ENCOUNTER — APPOINTMENT (OUTPATIENT)
Dept: ULTRASOUND IMAGING | Facility: HOSPITAL | Age: 52
End: 2022-02-25

## 2022-02-25 ENCOUNTER — APPOINTMENT (OUTPATIENT)
Dept: NUCLEAR MEDICINE | Facility: HOSPITAL | Age: 52
End: 2022-02-25

## 2022-02-25 LAB
25(OH)D3 SERPL-MCNC: 16.1 NG/ML (ref 30–100)
ANION GAP SERPL CALCULATED.3IONS-SCNC: 7 MMOL/L (ref 5–15)
BACTERIA SPEC AEROBE CULT: NORMAL
BUN SERPL-MCNC: 2 MG/DL (ref 6–20)
BUN/CREAT SERPL: 2.3 (ref 7–25)
CALCIUM SPEC-SCNC: 13.9 MG/DL (ref 8.6–10.5)
CHLORIDE SERPL-SCNC: 109 MMOL/L (ref 98–107)
CHOLEST SERPL-MCNC: 151 MG/DL (ref 0–200)
CO2 SERPL-SCNC: 25 MMOL/L (ref 22–29)
CREAT SERPL-MCNC: 0.88 MG/DL (ref 0.57–1)
DEPRECATED RDW RBC AUTO: 40.2 FL (ref 37–54)
ERYTHROCYTE [DISTWIDTH] IN BLOOD BY AUTOMATED COUNT: 12.6 % (ref 12.3–15.4)
FOLATE SERPL-MCNC: 3.17 NG/ML (ref 4.78–24.2)
GFR SERPL CREATININE-BSD FRML MDRD: 82 ML/MIN/1.73
GLUCOSE SERPL-MCNC: 124 MG/DL (ref 65–99)
HCT VFR BLD AUTO: 34.1 % (ref 34–46.6)
HDLC SERPL-MCNC: 38 MG/DL (ref 40–60)
HGB BLD-MCNC: 10.7 G/DL (ref 12–15.9)
IRON 24H UR-MRATE: 49 MCG/DL (ref 37–145)
IRON SATN MFR SERPL: 21 % (ref 20–50)
LDLC SERPL CALC-MCNC: 91 MG/DL (ref 0–100)
LDLC/HDLC SERPL: 2.34 {RATIO}
MAGNESIUM SERPL-MCNC: 2.4 MG/DL (ref 1.6–2.6)
MCH RBC QN AUTO: 27.6 PG (ref 26.6–33)
MCHC RBC AUTO-ENTMCNC: 31.4 G/DL (ref 31.5–35.7)
MCV RBC AUTO: 87.9 FL (ref 79–97)
PLATELET # BLD AUTO: 348 10*3/MM3 (ref 140–450)
PMV BLD AUTO: 9.3 FL (ref 6–12)
POTASSIUM SERPL-SCNC: 3.5 MMOL/L (ref 3.5–5.2)
POTASSIUM SERPL-SCNC: 3.6 MMOL/L (ref 3.5–5.2)
RBC # BLD AUTO: 3.88 10*6/MM3 (ref 3.77–5.28)
SODIUM SERPL-SCNC: 141 MMOL/L (ref 136–145)
TIBC SERPL-MCNC: 234 MCG/DL (ref 298–536)
TRANSFERRIN SERPL-MCNC: 157 MG/DL (ref 200–360)
TRIGL SERPL-MCNC: 120 MG/DL (ref 0–150)
VIT B12 BLD-MCNC: 457 PG/ML (ref 211–946)
VLDLC SERPL-MCNC: 22 MG/DL (ref 5–40)
WBC NRBC COR # BLD: 5.61 10*3/MM3 (ref 3.4–10.8)

## 2022-02-25 PROCEDURE — A9500 TC99M SESTAMIBI: HCPCS | Performed by: STUDENT IN AN ORGANIZED HEALTH CARE EDUCATION/TRAINING PROGRAM

## 2022-02-25 PROCEDURE — 25010000002 ENOXAPARIN PER 10 MG: Performed by: STUDENT IN AN ORGANIZED HEALTH CARE EDUCATION/TRAINING PROGRAM

## 2022-02-25 PROCEDURE — 63710000001 PROMETHAZINE PER 12.5 MG: Performed by: STUDENT IN AN ORGANIZED HEALTH CARE EDUCATION/TRAINING PROGRAM

## 2022-02-25 PROCEDURE — 84132 ASSAY OF SERUM POTASSIUM: CPT | Performed by: STUDENT IN AN ORGANIZED HEALTH CARE EDUCATION/TRAINING PROGRAM

## 2022-02-25 PROCEDURE — 82607 VITAMIN B-12: CPT | Performed by: FAMILY MEDICINE

## 2022-02-25 PROCEDURE — 84466 ASSAY OF TRANSFERRIN: CPT | Performed by: FAMILY MEDICINE

## 2022-02-25 PROCEDURE — 83540 ASSAY OF IRON: CPT | Performed by: FAMILY MEDICINE

## 2022-02-25 PROCEDURE — 0 TECHNETIUM SESTAMIBI: Performed by: STUDENT IN AN ORGANIZED HEALTH CARE EDUCATION/TRAINING PROGRAM

## 2022-02-25 PROCEDURE — 84075 ASSAY ALKALINE PHOSPHATASE: CPT | Performed by: FAMILY MEDICINE

## 2022-02-25 PROCEDURE — 78070 PARATHYROID PLANAR IMAGING: CPT

## 2022-02-25 PROCEDURE — 80061 LIPID PANEL: CPT | Performed by: FAMILY MEDICINE

## 2022-02-25 PROCEDURE — 25010000002 MAGNESIUM SULFATE 2 GM/50ML SOLUTION: Performed by: FAMILY MEDICINE

## 2022-02-25 PROCEDURE — 85027 COMPLETE CBC AUTOMATED: CPT | Performed by: FAMILY MEDICINE

## 2022-02-25 PROCEDURE — 84080 ASSAY ALKALINE PHOSPHATASES: CPT | Performed by: FAMILY MEDICINE

## 2022-02-25 PROCEDURE — 82746 ASSAY OF FOLIC ACID SERUM: CPT | Performed by: FAMILY MEDICINE

## 2022-02-25 PROCEDURE — 0 POTASSIUM CHLORIDE 10 MEQ/100ML SOLUTION: Performed by: NURSE PRACTITIONER

## 2022-02-25 PROCEDURE — 76536 US EXAM OF HEAD AND NECK: CPT

## 2022-02-25 PROCEDURE — 25010000002 SODIUM CHLORIDE 0.9 % WITH KCL 20 MEQ 20-0.9 MEQ/L-% SOLUTION: Performed by: INTERNAL MEDICINE

## 2022-02-25 PROCEDURE — 99232 SBSQ HOSP IP/OBS MODERATE 35: CPT | Performed by: STUDENT IN AN ORGANIZED HEALTH CARE EDUCATION/TRAINING PROGRAM

## 2022-02-25 PROCEDURE — 80048 BASIC METABOLIC PNL TOTAL CA: CPT | Performed by: FAMILY MEDICINE

## 2022-02-25 PROCEDURE — 25010000002 ONDANSETRON PER 1 MG: Performed by: FAMILY MEDICINE

## 2022-02-25 PROCEDURE — 83735 ASSAY OF MAGNESIUM: CPT | Performed by: FAMILY MEDICINE

## 2022-02-25 PROCEDURE — 63710000001 CALCITONIN PER 400 UNITS: Performed by: INTERNAL MEDICINE

## 2022-02-25 RX ORDER — PROMETHAZINE HYDROCHLORIDE 12.5 MG/1
12.5 TABLET ORAL EVERY 6 HOURS PRN
Status: DISCONTINUED | OUTPATIENT
Start: 2022-02-25 | End: 2022-03-02 | Stop reason: HOSPADM

## 2022-02-25 RX ADMIN — ENOXAPARIN SODIUM 30 MG: 30 INJECTION SUBCUTANEOUS at 20:56

## 2022-02-25 RX ADMIN — PROMETHAZINE HYDROCHLORIDE 12.5 MG: 12.5 TABLET ORAL at 17:53

## 2022-02-25 RX ADMIN — LISINOPRIL 20 MG: 20 TABLET ORAL at 08:48

## 2022-02-25 RX ADMIN — Medication 10 ML: at 20:57

## 2022-02-25 RX ADMIN — ACETAMINOPHEN ORAL SOLUTION 650 MG: 650 SOLUTION ORAL at 01:19

## 2022-02-25 RX ADMIN — TECHNETIUM TC 99M SESTAMIBI 1 DOSE: 1 INJECTION INTRAVENOUS at 11:10

## 2022-02-25 RX ADMIN — POTASSIUM CHLORIDE 10 MEQ: 7.46 INJECTION, SOLUTION INTRAVENOUS at 23:25

## 2022-02-25 RX ADMIN — POTASSIUM CHLORIDE 10 MEQ: 7.46 INJECTION, SOLUTION INTRAVENOUS at 17:15

## 2022-02-25 RX ADMIN — ENOXAPARIN SODIUM 30 MG: 30 INJECTION SUBCUTANEOUS at 13:40

## 2022-02-25 RX ADMIN — POTASSIUM CHLORIDE 10 MEQ: 7.46 INJECTION, SOLUTION INTRAVENOUS at 08:49

## 2022-02-25 RX ADMIN — ONDANSETRON 4 MG: 2 INJECTION INTRAMUSCULAR; INTRAVENOUS at 14:36

## 2022-02-25 RX ADMIN — POTASSIUM CHLORIDE AND SODIUM CHLORIDE 125 ML/HR: 900; 150 INJECTION, SOLUTION INTRAVENOUS at 14:36

## 2022-02-25 RX ADMIN — POTASSIUM CHLORIDE 10 MEQ: 7.46 INJECTION, SOLUTION INTRAVENOUS at 06:17

## 2022-02-25 RX ADMIN — CALCITONIN SALMON 452 UNITS: 200 INJECTION, SOLUTION INTRAMUSCULAR; SUBCUTANEOUS at 08:48

## 2022-02-25 RX ADMIN — ONDANSETRON 4 MG: 2 INJECTION INTRAMUSCULAR; INTRAVENOUS at 00:02

## 2022-02-25 RX ADMIN — POTASSIUM CHLORIDE 40 MEQ: 1.5 POWDER, FOR SOLUTION ORAL at 00:39

## 2022-02-25 RX ADMIN — MAGNESIUM SULFATE HEPTAHYDRATE 2 G: 40 INJECTION, SOLUTION INTRAVENOUS at 01:10

## 2022-02-25 RX ADMIN — ONDANSETRON 4 MG: 2 INJECTION INTRAMUSCULAR; INTRAVENOUS at 05:37

## 2022-02-25 RX ADMIN — ACETAMINOPHEN ORAL SOLUTION 650 MG: 650 SOLUTION ORAL at 15:29

## 2022-02-25 RX ADMIN — MAGNESIUM SULFATE HEPTAHYDRATE 2 G: 40 INJECTION, SOLUTION INTRAVENOUS at 03:11

## 2022-02-26 LAB
CALCIUM SPEC-SCNC: 13 MG/DL (ref 8.6–10.5)
MAGNESIUM SERPL-MCNC: 1.8 MG/DL (ref 1.6–2.6)
POTASSIUM SERPL-SCNC: 3.2 MMOL/L (ref 3.5–5.2)
POTASSIUM SERPL-SCNC: 3.6 MMOL/L (ref 3.5–5.2)

## 2022-02-26 PROCEDURE — 82310 ASSAY OF CALCIUM: CPT | Performed by: INTERNAL MEDICINE

## 2022-02-26 PROCEDURE — 0 MAGNESIUM SULFATE 4 GM/100ML SOLUTION: Performed by: FAMILY MEDICINE

## 2022-02-26 PROCEDURE — 25010000002 ENOXAPARIN PER 10 MG: Performed by: STUDENT IN AN ORGANIZED HEALTH CARE EDUCATION/TRAINING PROGRAM

## 2022-02-26 PROCEDURE — 0 POTASSIUM CHLORIDE 10 MEQ/100ML SOLUTION: Performed by: NURSE PRACTITIONER

## 2022-02-26 PROCEDURE — 99232 SBSQ HOSP IP/OBS MODERATE 35: CPT | Performed by: STUDENT IN AN ORGANIZED HEALTH CARE EDUCATION/TRAINING PROGRAM

## 2022-02-26 PROCEDURE — 83735 ASSAY OF MAGNESIUM: CPT | Performed by: STUDENT IN AN ORGANIZED HEALTH CARE EDUCATION/TRAINING PROGRAM

## 2022-02-26 PROCEDURE — 84132 ASSAY OF SERUM POTASSIUM: CPT | Performed by: STUDENT IN AN ORGANIZED HEALTH CARE EDUCATION/TRAINING PROGRAM

## 2022-02-26 PROCEDURE — 25810000003 SODIUM CHLORIDE 0.9 % WITH KCL 40 MEQ/L 40-0.9 MEQ/L-% SOLUTION: Performed by: STUDENT IN AN ORGANIZED HEALTH CARE EDUCATION/TRAINING PROGRAM

## 2022-02-26 PROCEDURE — 25810000003 SODIUM CHLORIDE 0.9 % WITH KCL 40 MEQ/L 40-0.9 MEQ/L-% SOLUTION: Performed by: INTERNAL MEDICINE

## 2022-02-26 PROCEDURE — 25010000002 SODIUM CHLORIDE 0.9 % WITH KCL 20 MEQ 20-0.9 MEQ/L-% SOLUTION: Performed by: INTERNAL MEDICINE

## 2022-02-26 RX ORDER — SODIUM CHLORIDE AND POTASSIUM CHLORIDE 300; 900 MG/100ML; MG/100ML
75 INJECTION, SOLUTION INTRAVENOUS CONTINUOUS
Status: DISPENSED | OUTPATIENT
Start: 2022-02-26 | End: 2022-02-26

## 2022-02-26 RX ADMIN — Medication 10 ML: at 21:19

## 2022-02-26 RX ADMIN — POTASSIUM CHLORIDE 10 MEQ: 7.46 INJECTION, SOLUTION INTRAVENOUS at 03:47

## 2022-02-26 RX ADMIN — POTASSIUM CHLORIDE 10 MEQ: 7.46 INJECTION, SOLUTION INTRAVENOUS at 01:22

## 2022-02-26 RX ADMIN — ENOXAPARIN SODIUM 30 MG: 30 INJECTION SUBCUTANEOUS at 21:19

## 2022-02-26 RX ADMIN — POTASSIUM CHLORIDE AND SODIUM CHLORIDE 125 ML/HR: 900; 300 INJECTION, SOLUTION INTRAVENOUS at 12:21

## 2022-02-26 RX ADMIN — ENOXAPARIN SODIUM 30 MG: 30 INJECTION SUBCUTANEOUS at 08:08

## 2022-02-26 RX ADMIN — POTASSIUM CHLORIDE AND SODIUM CHLORIDE 125 ML/HR: 900; 150 INJECTION, SOLUTION INTRAVENOUS at 01:22

## 2022-02-26 RX ADMIN — MAGNESIUM SULFATE HEPTAHYDRATE 4 G: 40 INJECTION, SOLUTION INTRAVENOUS at 13:31

## 2022-02-26 RX ADMIN — POTASSIUM CHLORIDE AND SODIUM CHLORIDE 75 ML/HR: 900; 300 INJECTION, SOLUTION INTRAVENOUS at 21:28

## 2022-02-26 RX ADMIN — Medication 10 ML: at 08:08

## 2022-02-27 LAB
ALBUMIN SERPL-MCNC: 3.1 G/DL (ref 3.5–5.2)
ALBUMIN/GLOB SERPL: 1 G/DL
ALP SERPL-CCNC: 195 U/L (ref 39–117)
ALT SERPL W P-5'-P-CCNC: 11 U/L (ref 1–33)
ANION GAP SERPL CALCULATED.3IONS-SCNC: 7 MMOL/L (ref 5–15)
AST SERPL-CCNC: 21 U/L (ref 1–32)
BILIRUB SERPL-MCNC: 0.4 MG/DL (ref 0–1.2)
BUN SERPL-MCNC: 2 MG/DL (ref 6–20)
BUN/CREAT SERPL: 2.9 (ref 7–25)
CALCIUM SPEC-SCNC: 13.2 MG/DL (ref 8.6–10.5)
CHLORIDE SERPL-SCNC: 108 MMOL/L (ref 98–107)
CO2 SERPL-SCNC: 22 MMOL/L (ref 22–29)
CREAT SERPL-MCNC: 0.7 MG/DL (ref 0.57–1)
GFR SERPL CREATININE-BSD FRML MDRD: 107 ML/MIN/1.73
GLOBULIN UR ELPH-MCNC: 3.1 GM/DL
GLUCOSE SERPL-MCNC: 107 MG/DL (ref 65–99)
MAGNESIUM SERPL-MCNC: 1.6 MG/DL (ref 1.6–2.6)
POTASSIUM SERPL-SCNC: 3.3 MMOL/L (ref 3.5–5.2)
PROT SERPL-MCNC: 6.2 G/DL (ref 6–8.5)
SODIUM SERPL-SCNC: 137 MMOL/L (ref 136–145)

## 2022-02-27 PROCEDURE — 99232 SBSQ HOSP IP/OBS MODERATE 35: CPT | Performed by: STUDENT IN AN ORGANIZED HEALTH CARE EDUCATION/TRAINING PROGRAM

## 2022-02-27 PROCEDURE — 25010000002 ENOXAPARIN PER 10 MG: Performed by: STUDENT IN AN ORGANIZED HEALTH CARE EDUCATION/TRAINING PROGRAM

## 2022-02-27 PROCEDURE — 0 MAGNESIUM SULFATE 4 GM/100ML SOLUTION: Performed by: FAMILY MEDICINE

## 2022-02-27 PROCEDURE — 84132 ASSAY OF SERUM POTASSIUM: CPT | Performed by: STUDENT IN AN ORGANIZED HEALTH CARE EDUCATION/TRAINING PROGRAM

## 2022-02-27 PROCEDURE — 83735 ASSAY OF MAGNESIUM: CPT | Performed by: STUDENT IN AN ORGANIZED HEALTH CARE EDUCATION/TRAINING PROGRAM

## 2022-02-27 PROCEDURE — 80053 COMPREHEN METABOLIC PANEL: CPT | Performed by: STUDENT IN AN ORGANIZED HEALTH CARE EDUCATION/TRAINING PROGRAM

## 2022-02-27 RX ORDER — SODIUM CHLORIDE 9 MG/ML
125 INJECTION, SOLUTION INTRAVENOUS CONTINUOUS
Status: DISCONTINUED | OUTPATIENT
Start: 2022-02-27 | End: 2022-02-28 | Stop reason: SDUPTHER

## 2022-02-27 RX ORDER — SODIUM CHLORIDE, SODIUM LACTATE, POTASSIUM CHLORIDE, CALCIUM CHLORIDE 600; 310; 30; 20 MG/100ML; MG/100ML; MG/100ML; MG/100ML
75 INJECTION, SOLUTION INTRAVENOUS CONTINUOUS
Status: DISCONTINUED | OUTPATIENT
Start: 2022-02-27 | End: 2022-02-27

## 2022-02-27 RX ADMIN — Medication 10 ML: at 09:11

## 2022-02-27 RX ADMIN — ACETAMINOPHEN ORAL SOLUTION 650 MG: 650 SOLUTION ORAL at 15:52

## 2022-02-27 RX ADMIN — MAGNESIUM SULFATE HEPTAHYDRATE 4 G: 40 INJECTION, SOLUTION INTRAVENOUS at 14:29

## 2022-02-27 RX ADMIN — POTASSIUM CHLORIDE 40 MEQ: 750 CAPSULE, EXTENDED RELEASE ORAL at 17:58

## 2022-02-27 RX ADMIN — SODIUM CHLORIDE 75 ML/HR: 9 INJECTION, SOLUTION INTRAVENOUS at 14:29

## 2022-02-27 RX ADMIN — POTASSIUM CHLORIDE 40 MEQ: 750 CAPSULE, EXTENDED RELEASE ORAL at 11:27

## 2022-02-27 RX ADMIN — ENOXAPARIN SODIUM 30 MG: 30 INJECTION SUBCUTANEOUS at 09:11

## 2022-02-27 RX ADMIN — ENOXAPARIN SODIUM 30 MG: 30 INJECTION SUBCUTANEOUS at 20:49

## 2022-02-27 RX ADMIN — Medication 10 ML: at 20:49

## 2022-02-27 RX ADMIN — SODIUM CHLORIDE, POTASSIUM CHLORIDE, SODIUM LACTATE AND CALCIUM CHLORIDE 75 ML/HR: 600; 310; 30; 20 INJECTION, SOLUTION INTRAVENOUS at 11:27

## 2022-02-28 ENCOUNTER — ANESTHESIA (OUTPATIENT)
Dept: PERIOP | Facility: HOSPITAL | Age: 52
End: 2022-02-28

## 2022-02-28 ENCOUNTER — ANESTHESIA EVENT (OUTPATIENT)
Dept: PERIOP | Facility: HOSPITAL | Age: 52
End: 2022-02-28

## 2022-02-28 LAB
ACE SERPL-CCNC: <15 U/L (ref 14–82)
ALBUMIN SERPL ELPH-MCNC: 3.2 G/DL (ref 2.9–4.4)
ALBUMIN/GLOB SERPL: 0.8 {RATIO} (ref 0.7–1.7)
ALP BONE CFR SERPL: 67 % (ref 14–68)
ALP INTEST CFR SERPL: 0 % (ref 0–18)
ALP LIVER CFR SERPL: 33 % (ref 18–85)
ALP SERPL-CCNC: 209 IU/L (ref 44–121)
ALPHA1 GLOB SERPL ELPH-MCNC: 0.2 G/DL (ref 0–0.4)
ALPHA2 GLOB SERPL ELPH-MCNC: 0.8 G/DL (ref 0.4–1)
ANION GAP SERPL CALCULATED.3IONS-SCNC: 6 MMOL/L (ref 5–15)
B-GLOBULIN SERPL ELPH-MCNC: 1 G/DL (ref 0.7–1.3)
B-HCG UR QL: NEGATIVE
BUN SERPL-MCNC: <2 MG/DL (ref 6–20)
BUN/CREAT SERPL: ABNORMAL
CALCIUM SPEC-SCNC: 11.7 MG/DL (ref 8.6–10.5)
CALCIUM SPEC-SCNC: 12.2 MG/DL (ref 8.6–10.5)
CHLORIDE SERPL-SCNC: 112 MMOL/L (ref 98–107)
CO2 SERPL-SCNC: 21 MMOL/L (ref 22–29)
CREAT SERPL-MCNC: 0.68 MG/DL (ref 0.57–1)
DEPRECATED RDW RBC AUTO: 41.6 FL (ref 37–54)
ERYTHROCYTE [DISTWIDTH] IN BLOOD BY AUTOMATED COUNT: 12.8 % (ref 12.3–15.4)
EXPIRATION DATE: NORMAL
GAMMA GLOB SERPL ELPH-MCNC: 1.7 G/DL (ref 0.4–1.8)
GFR SERPL CREATININE-BSD FRML MDRD: 110 ML/MIN/1.73
GLOBULIN SER CALC-MCNC: 3.8 G/DL (ref 2.2–3.9)
GLUCOSE SERPL-MCNC: 86 MG/DL (ref 65–99)
HCT VFR BLD AUTO: 32 % (ref 34–46.6)
HGB BLD-MCNC: 10 G/DL (ref 12–15.9)
IGA SERPL-MCNC: 304 MG/DL (ref 87–352)
IGG SERPL-MCNC: 1717 MG/DL (ref 586–1602)
IGM SERPL-MCNC: 99 MG/DL (ref 26–217)
INTERNAL NEGATIVE CONTROL: NORMAL
INTERNAL POSITIVE CONTROL: NORMAL
KAPPA LC FREE SER-MCNC: 55.9 MG/L (ref 3.3–19.4)
KAPPA LC FREE/LAMBDA FREE SER: 1.57 {RATIO} (ref 0.26–1.65)
LABORATORY COMMENT REPORT: NORMAL
LAMBDA LC FREE SERPL-MCNC: 35.7 MG/L (ref 5.7–26.3)
Lab: NORMAL
M PROTEIN SERPL ELPH-MCNC: NORMAL G/DL
MAGNESIUM SERPL-MCNC: 1.7 MG/DL (ref 1.6–2.6)
MCH RBC QN AUTO: 27.6 PG (ref 26.6–33)
MCHC RBC AUTO-ENTMCNC: 31.3 G/DL (ref 31.5–35.7)
MCV RBC AUTO: 88.4 FL (ref 79–97)
PLATELET # BLD AUTO: 295 10*3/MM3 (ref 140–450)
PMV BLD AUTO: 9.7 FL (ref 6–12)
POTASSIUM SERPL-SCNC: 3.7 MMOL/L (ref 3.5–5.2)
POTASSIUM SERPL-SCNC: 3.9 MMOL/L (ref 3.5–5.2)
PROT PATTERN SERPL ELPH-IMP: NORMAL
PROT PATTERN SERPL IFE-IMP: ABNORMAL
PROT SERPL-MCNC: 7 G/DL (ref 6–8.5)
RBC # BLD AUTO: 3.62 10*6/MM3 (ref 3.77–5.28)
SODIUM SERPL-SCNC: 139 MMOL/L (ref 136–145)
WBC NRBC COR # BLD: 2.43 10*3/MM3 (ref 3.4–10.8)

## 2022-02-28 PROCEDURE — 0 MAGNESIUM SULFATE 4 GM/100ML SOLUTION: Performed by: FAMILY MEDICINE

## 2022-02-28 PROCEDURE — 0 CEFAZOLIN IN DEXTROSE 2-4 GM/100ML-% SOLUTION: Performed by: SURGERY

## 2022-02-28 PROCEDURE — C1889 IMPLANT/INSERT DEVICE, NOC: HCPCS | Performed by: SURGERY

## 2022-02-28 PROCEDURE — 83735 ASSAY OF MAGNESIUM: CPT | Performed by: STUDENT IN AN ORGANIZED HEALTH CARE EDUCATION/TRAINING PROGRAM

## 2022-02-28 PROCEDURE — 81025 URINE PREGNANCY TEST: CPT | Performed by: ANESTHESIOLOGY

## 2022-02-28 PROCEDURE — 0 LIDOCAINE 1 % SOLUTION: Performed by: NURSE ANESTHETIST, CERTIFIED REGISTERED

## 2022-02-28 PROCEDURE — 25010000002 ENOXAPARIN PER 10 MG: Performed by: SURGERY

## 2022-02-28 PROCEDURE — 99232 SBSQ HOSP IP/OBS MODERATE 35: CPT | Performed by: STUDENT IN AN ORGANIZED HEALTH CARE EDUCATION/TRAINING PROGRAM

## 2022-02-28 PROCEDURE — 25010000002 FENTANYL CITRATE (PF) 250 MCG/5ML SOLUTION

## 2022-02-28 PROCEDURE — 0GTM0ZZ RESECTION OF LEFT SUPERIOR PARATHYROID GLAND, OPEN APPROACH: ICD-10-PCS | Performed by: SURGERY

## 2022-02-28 PROCEDURE — 88305 TISSUE EXAM BY PATHOLOGIST: CPT | Performed by: SURGERY

## 2022-02-28 PROCEDURE — 80048 BASIC METABOLIC PNL TOTAL CA: CPT | Performed by: SURGERY

## 2022-02-28 PROCEDURE — 25010000002 ONDANSETRON PER 1 MG: Performed by: NURSE ANESTHETIST, CERTIFIED REGISTERED

## 2022-02-28 PROCEDURE — 85027 COMPLETE CBC AUTOMATED: CPT | Performed by: SURGERY

## 2022-02-28 PROCEDURE — 25010000002 FENTANYL CITRATE (PF) 50 MCG/ML SOLUTION: Performed by: NURSE ANESTHETIST, CERTIFIED REGISTERED

## 2022-02-28 PROCEDURE — 25010000002 DEXAMETHASONE PER 1 MG: Performed by: NURSE ANESTHETIST, CERTIFIED REGISTERED

## 2022-02-28 PROCEDURE — 88331 PATH CONSLTJ SURG 1 BLK 1SPC: CPT | Performed by: PATHOLOGY

## 2022-02-28 PROCEDURE — 25010000002 PROPOFOL 10 MG/ML EMULSION: Performed by: NURSE ANESTHETIST, CERTIFIED REGISTERED

## 2022-02-28 PROCEDURE — 25010000002 SUCCINYLCHOLINE PER 20 MG: Performed by: NURSE ANESTHETIST, CERTIFIED REGISTERED

## 2022-02-28 PROCEDURE — 82310 ASSAY OF CALCIUM: CPT | Performed by: SURGERY

## 2022-02-28 DEVICE — CLIP LIGAT VASC HORIZON TI MD BLU 6CT: Type: IMPLANTABLE DEVICE | Site: PARATHYROID | Status: FUNCTIONAL

## 2022-02-28 DEVICE — CLIP LIGAT VASC HORIZON TI SM YEL 6CT: Type: IMPLANTABLE DEVICE | Site: PARATHYROID | Status: FUNCTIONAL

## 2022-02-28 RX ORDER — CEFAZOLIN SODIUM 2 G/100ML
2 INJECTION, SOLUTION INTRAVENOUS ONCE
Status: COMPLETED | OUTPATIENT
Start: 2022-02-28 | End: 2022-02-28

## 2022-02-28 RX ORDER — LIDOCAINE HYDROCHLORIDE 10 MG/ML
INJECTION, SOLUTION INFILTRATION; PERINEURAL AS NEEDED
Status: DISCONTINUED | OUTPATIENT
Start: 2022-02-28 | End: 2022-02-28 | Stop reason: SURG

## 2022-02-28 RX ORDER — ONDANSETRON 2 MG/ML
4 INJECTION INTRAMUSCULAR; INTRAVENOUS ONCE AS NEEDED
Status: DISCONTINUED | OUTPATIENT
Start: 2022-02-28 | End: 2022-02-28 | Stop reason: HOSPADM

## 2022-02-28 RX ORDER — SODIUM CHLORIDE 0.9 % (FLUSH) 0.9 %
3 SYRINGE (ML) INJECTION EVERY 12 HOURS SCHEDULED
Status: DISCONTINUED | OUTPATIENT
Start: 2022-02-28 | End: 2022-02-28 | Stop reason: HOSPADM

## 2022-02-28 RX ORDER — OXYCODONE HYDROCHLORIDE AND ACETAMINOPHEN 5; 325 MG/1; MG/1
1 TABLET ORAL EVERY 4 HOURS PRN
Status: DISCONTINUED | OUTPATIENT
Start: 2022-02-28 | End: 2022-03-02 | Stop reason: HOSPADM

## 2022-02-28 RX ORDER — FAMOTIDINE 10 MG/ML
20 INJECTION, SOLUTION INTRAVENOUS ONCE
Status: CANCELLED | OUTPATIENT
Start: 2022-02-28 | End: 2022-02-28

## 2022-02-28 RX ORDER — DEXAMETHASONE SODIUM PHOSPHATE 4 MG/ML
INJECTION, SOLUTION INTRA-ARTICULAR; INTRALESIONAL; INTRAMUSCULAR; INTRAVENOUS; SOFT TISSUE AS NEEDED
Status: DISCONTINUED | OUTPATIENT
Start: 2022-02-28 | End: 2022-02-28 | Stop reason: SURG

## 2022-02-28 RX ORDER — LABETALOL HYDROCHLORIDE 5 MG/ML
5 INJECTION, SOLUTION INTRAVENOUS
Status: DISCONTINUED | OUTPATIENT
Start: 2022-02-28 | End: 2022-02-28 | Stop reason: HOSPADM

## 2022-02-28 RX ORDER — CALCIUM CARBONATE 200(500)MG
2 TABLET,CHEWABLE ORAL 2 TIMES DAILY
Status: DISCONTINUED | OUTPATIENT
Start: 2022-02-28 | End: 2022-03-02 | Stop reason: HOSPADM

## 2022-02-28 RX ORDER — PROMETHAZINE HYDROCHLORIDE 25 MG/1
25 SUPPOSITORY RECTAL ONCE AS NEEDED
Status: DISCONTINUED | OUTPATIENT
Start: 2022-02-28 | End: 2022-02-28 | Stop reason: HOSPADM

## 2022-02-28 RX ORDER — PROPOFOL 10 MG/ML
VIAL (ML) INTRAVENOUS AS NEEDED
Status: DISCONTINUED | OUTPATIENT
Start: 2022-02-28 | End: 2022-02-28 | Stop reason: SURG

## 2022-02-28 RX ORDER — ROCURONIUM BROMIDE 10 MG/ML
INJECTION, SOLUTION INTRAVENOUS AS NEEDED
Status: DISCONTINUED | OUTPATIENT
Start: 2022-02-28 | End: 2022-02-28 | Stop reason: SURG

## 2022-02-28 RX ORDER — SCOLOPAMINE TRANSDERMAL SYSTEM 1 MG/1
1 PATCH, EXTENDED RELEASE TRANSDERMAL
Status: DISCONTINUED | OUTPATIENT
Start: 2022-02-28 | End: 2022-02-28 | Stop reason: HOSPADM

## 2022-02-28 RX ORDER — PROMETHAZINE HYDROCHLORIDE 25 MG/1
25 TABLET ORAL ONCE AS NEEDED
Status: DISCONTINUED | OUTPATIENT
Start: 2022-02-28 | End: 2022-02-28 | Stop reason: SDUPTHER

## 2022-02-28 RX ORDER — PROMETHAZINE HYDROCHLORIDE 25 MG/1
25 TABLET ORAL ONCE AS NEEDED
Status: DISCONTINUED | OUTPATIENT
Start: 2022-02-28 | End: 2022-02-28 | Stop reason: HOSPADM

## 2022-02-28 RX ORDER — HYDROMORPHONE HYDROCHLORIDE 1 MG/ML
0.5 INJECTION, SOLUTION INTRAMUSCULAR; INTRAVENOUS; SUBCUTANEOUS
Status: DISCONTINUED | OUTPATIENT
Start: 2022-02-28 | End: 2022-02-28 | Stop reason: HOSPADM

## 2022-02-28 RX ORDER — SODIUM CHLORIDE 0.9 % (FLUSH) 0.9 %
3-10 SYRINGE (ML) INJECTION AS NEEDED
Status: DISCONTINUED | OUTPATIENT
Start: 2022-02-28 | End: 2022-02-28 | Stop reason: HOSPADM

## 2022-02-28 RX ORDER — MIDAZOLAM HYDROCHLORIDE 1 MG/ML
1 INJECTION INTRAMUSCULAR; INTRAVENOUS
Status: DISCONTINUED | OUTPATIENT
Start: 2022-02-28 | End: 2022-02-28 | Stop reason: HOSPADM

## 2022-02-28 RX ORDER — FENTANYL CITRATE 50 UG/ML
INJECTION, SOLUTION INTRAMUSCULAR; INTRAVENOUS AS NEEDED
Status: DISCONTINUED | OUTPATIENT
Start: 2022-02-28 | End: 2022-02-28 | Stop reason: SURG

## 2022-02-28 RX ORDER — ONDANSETRON 2 MG/ML
4 INJECTION INTRAMUSCULAR; INTRAVENOUS EVERY 6 HOURS PRN
Status: DISCONTINUED | OUTPATIENT
Start: 2022-02-28 | End: 2022-03-02 | Stop reason: HOSPADM

## 2022-02-28 RX ORDER — SODIUM CHLORIDE, SODIUM LACTATE, POTASSIUM CHLORIDE, CALCIUM CHLORIDE 600; 310; 30; 20 MG/100ML; MG/100ML; MG/100ML; MG/100ML
9 INJECTION, SOLUTION INTRAVENOUS CONTINUOUS
Status: DISCONTINUED | OUTPATIENT
Start: 2022-02-28 | End: 2022-02-28

## 2022-02-28 RX ORDER — MAGNESIUM HYDROXIDE 1200 MG/15ML
LIQUID ORAL AS NEEDED
Status: DISCONTINUED | OUTPATIENT
Start: 2022-02-28 | End: 2022-02-28 | Stop reason: HOSPADM

## 2022-02-28 RX ORDER — SODIUM CHLORIDE 0.9 % (FLUSH) 0.9 %
10 SYRINGE (ML) INJECTION AS NEEDED
Status: DISCONTINUED | OUTPATIENT
Start: 2022-02-28 | End: 2022-02-28 | Stop reason: HOSPADM

## 2022-02-28 RX ORDER — ONDANSETRON 4 MG/1
4 TABLET, FILM COATED ORAL EVERY 6 HOURS PRN
Status: DISCONTINUED | OUTPATIENT
Start: 2022-02-28 | End: 2022-03-02 | Stop reason: HOSPADM

## 2022-02-28 RX ORDER — IPRATROPIUM BROMIDE AND ALBUTEROL SULFATE 2.5; .5 MG/3ML; MG/3ML
3 SOLUTION RESPIRATORY (INHALATION) ONCE AS NEEDED
Status: DISCONTINUED | OUTPATIENT
Start: 2022-02-28 | End: 2022-02-28 | Stop reason: HOSPADM

## 2022-02-28 RX ORDER — SUCCINYLCHOLINE CHLORIDE 20 MG/ML
INJECTION INTRAMUSCULAR; INTRAVENOUS AS NEEDED
Status: DISCONTINUED | OUTPATIENT
Start: 2022-02-28 | End: 2022-02-28 | Stop reason: SURG

## 2022-02-28 RX ORDER — FAMOTIDINE 20 MG/1
20 TABLET, FILM COATED ORAL ONCE
Status: COMPLETED | OUTPATIENT
Start: 2022-02-28 | End: 2022-02-28

## 2022-02-28 RX ORDER — FENTANYL CITRATE 50 UG/ML
INJECTION, SOLUTION INTRAMUSCULAR; INTRAVENOUS
Status: COMPLETED
Start: 2022-02-28 | End: 2022-02-28

## 2022-02-28 RX ORDER — NALOXONE HCL 0.4 MG/ML
0.4 VIAL (ML) INJECTION AS NEEDED
Status: DISCONTINUED | OUTPATIENT
Start: 2022-02-28 | End: 2022-02-28 | Stop reason: HOSPADM

## 2022-02-28 RX ORDER — FENTANYL CITRATE 50 UG/ML
50 INJECTION, SOLUTION INTRAMUSCULAR; INTRAVENOUS
Status: DISCONTINUED | OUTPATIENT
Start: 2022-02-28 | End: 2022-02-28 | Stop reason: HOSPADM

## 2022-02-28 RX ORDER — LIDOCAINE HYDROCHLORIDE 10 MG/ML
0.5 INJECTION, SOLUTION EPIDURAL; INFILTRATION; INTRACAUDAL; PERINEURAL ONCE AS NEEDED
Status: DISCONTINUED | OUTPATIENT
Start: 2022-02-28 | End: 2022-02-28 | Stop reason: HOSPADM

## 2022-02-28 RX ORDER — FAMOTIDINE 20 MG/1
20 TABLET, FILM COATED ORAL 2 TIMES DAILY
Status: DISCONTINUED | OUTPATIENT
Start: 2022-02-28 | End: 2022-03-02 | Stop reason: HOSPADM

## 2022-02-28 RX ORDER — DROPERIDOL 2.5 MG/ML
0.62 INJECTION, SOLUTION INTRAMUSCULAR; INTRAVENOUS ONCE AS NEEDED
Status: DISCONTINUED | OUTPATIENT
Start: 2022-02-28 | End: 2022-02-28 | Stop reason: HOSPADM

## 2022-02-28 RX ORDER — NALOXONE HCL 0.4 MG/ML
0.1 VIAL (ML) INJECTION
Status: DISCONTINUED | OUTPATIENT
Start: 2022-02-28 | End: 2022-03-02 | Stop reason: HOSPADM

## 2022-02-28 RX ORDER — SODIUM CHLORIDE 0.9 % (FLUSH) 0.9 %
10 SYRINGE (ML) INJECTION EVERY 12 HOURS SCHEDULED
Status: DISCONTINUED | OUTPATIENT
Start: 2022-02-28 | End: 2022-02-28 | Stop reason: HOSPADM

## 2022-02-28 RX ORDER — SODIUM CHLORIDE 9 MG/ML
75 INJECTION, SOLUTION INTRAVENOUS CONTINUOUS
Status: DISCONTINUED | OUTPATIENT
Start: 2022-02-28 | End: 2022-03-01

## 2022-02-28 RX ORDER — DROPERIDOL 2.5 MG/ML
0.62 INJECTION, SOLUTION INTRAMUSCULAR; INTRAVENOUS AS NEEDED
Status: DISCONTINUED | OUTPATIENT
Start: 2022-02-28 | End: 2022-02-28 | Stop reason: HOSPADM

## 2022-02-28 RX ORDER — ONDANSETRON 2 MG/ML
INJECTION INTRAMUSCULAR; INTRAVENOUS AS NEEDED
Status: DISCONTINUED | OUTPATIENT
Start: 2022-02-28 | End: 2022-02-28 | Stop reason: SURG

## 2022-02-28 RX ORDER — DOCUSATE SODIUM 100 MG/1
100 CAPSULE, LIQUID FILLED ORAL 2 TIMES DAILY PRN
Status: DISCONTINUED | OUTPATIENT
Start: 2022-02-28 | End: 2022-03-02 | Stop reason: HOSPADM

## 2022-02-28 RX ORDER — HYDROMORPHONE HYDROCHLORIDE 1 MG/ML
0.5 INJECTION, SOLUTION INTRAMUSCULAR; INTRAVENOUS; SUBCUTANEOUS
Status: DISCONTINUED | OUTPATIENT
Start: 2022-02-28 | End: 2022-03-02 | Stop reason: HOSPADM

## 2022-02-28 RX ORDER — HYDROCODONE BITARTRATE AND ACETAMINOPHEN 5; 325 MG/1; MG/1
1 TABLET ORAL ONCE AS NEEDED
Status: DISCONTINUED | OUTPATIENT
Start: 2022-02-28 | End: 2022-02-28 | Stop reason: HOSPADM

## 2022-02-28 RX ADMIN — ENOXAPARIN SODIUM 30 MG: 30 INJECTION SUBCUTANEOUS at 20:05

## 2022-02-28 RX ADMIN — SCOPALAMINE 1 PATCH: 1 PATCH, EXTENDED RELEASE TRANSDERMAL at 14:55

## 2022-02-28 RX ADMIN — FENTANYL CITRATE 50 MCG: 50 INJECTION, SOLUTION INTRAMUSCULAR; INTRAVENOUS at 17:27

## 2022-02-28 RX ADMIN — OXYCODONE HYDROCHLORIDE AND ACETAMINOPHEN 1 TABLET: 5; 325 TABLET ORAL at 20:05

## 2022-02-28 RX ADMIN — PROPOFOL 25 MCG/KG/MIN: 10 INJECTION, EMULSION INTRAVENOUS at 15:15

## 2022-02-28 RX ADMIN — ROCURONIUM BROMIDE 5 MG: 10 INJECTION, SOLUTION INTRAVENOUS at 15:12

## 2022-02-28 RX ADMIN — FAMOTIDINE 20 MG: 20 TABLET ORAL at 14:55

## 2022-02-28 RX ADMIN — Medication 10 ML: at 20:06

## 2022-02-28 RX ADMIN — FENTANYL CITRATE 100 MCG: 50 INJECTION, SOLUTION INTRAMUSCULAR; INTRAVENOUS at 15:05

## 2022-02-28 RX ADMIN — PROPOFOL 200 MG: 10 INJECTION, EMULSION INTRAVENOUS at 15:12

## 2022-02-28 RX ADMIN — LIDOCAINE HYDROCHLORIDE 50 MG: 10 INJECTION, SOLUTION INFILTRATION; PERINEURAL at 15:12

## 2022-02-28 RX ADMIN — ONDANSETRON 4 MG: 2 INJECTION INTRAMUSCULAR; INTRAVENOUS at 16:10

## 2022-02-28 RX ADMIN — FAMOTIDINE 20 MG: 20 TABLET, FILM COATED ORAL at 20:05

## 2022-02-28 RX ADMIN — DEXAMETHASONE SODIUM PHOSPHATE 8 MG: 4 INJECTION, SOLUTION INTRA-ARTICULAR; INTRALESIONAL; INTRAMUSCULAR; INTRAVENOUS; SOFT TISSUE at 15:15

## 2022-02-28 RX ADMIN — MAGNESIUM SULFATE HEPTAHYDRATE 4 G: 40 INJECTION, SOLUTION INTRAVENOUS at 08:54

## 2022-02-28 RX ADMIN — ANTACID TABLETS 2 TABLET: 500 TABLET, CHEWABLE ORAL at 20:05

## 2022-02-28 RX ADMIN — CEFAZOLIN SODIUM 2 G: 2 INJECTION, SOLUTION INTRAVENOUS at 15:15

## 2022-02-28 RX ADMIN — Medication 10 ML: at 09:00

## 2022-02-28 RX ADMIN — SODIUM CHLORIDE, POTASSIUM CHLORIDE, SODIUM LACTATE AND CALCIUM CHLORIDE 9 ML/HR: 600; 310; 30; 20 INJECTION, SOLUTION INTRAVENOUS at 14:55

## 2022-02-28 RX ADMIN — Medication 180 MG: at 15:12

## 2022-03-01 PROBLEM — E43 SEVERE MALNUTRITION (HCC): Status: ACTIVE | Noted: 2022-03-01

## 2022-03-01 LAB
CALCIUM SPEC-SCNC: 10.9 MG/DL (ref 8.6–10.5)
CALCIUM SPEC-SCNC: 11.1 MG/DL (ref 8.6–10.5)
CALCIUM SPEC-SCNC: 11.5 MG/DL (ref 8.6–10.5)
MAGNESIUM SERPL-MCNC: 1.8 MG/DL (ref 1.6–2.6)

## 2022-03-01 PROCEDURE — 99232 SBSQ HOSP IP/OBS MODERATE 35: CPT | Performed by: INTERNAL MEDICINE

## 2022-03-01 PROCEDURE — 83735 ASSAY OF MAGNESIUM: CPT | Performed by: SURGERY

## 2022-03-01 PROCEDURE — 82310 ASSAY OF CALCIUM: CPT | Performed by: INTERNAL MEDICINE

## 2022-03-01 PROCEDURE — 82310 ASSAY OF CALCIUM: CPT | Performed by: SURGERY

## 2022-03-01 PROCEDURE — 0 MAGNESIUM SULFATE 4 GM/100ML SOLUTION: Performed by: SURGERY

## 2022-03-01 PROCEDURE — 25010000002 ENOXAPARIN PER 10 MG: Performed by: SURGERY

## 2022-03-01 RX ORDER — CHOLECALCIFEROL (VITAMIN D3) 125 MCG
5 CAPSULE ORAL NIGHTLY PRN
Status: DISCONTINUED | OUTPATIENT
Start: 2022-03-01 | End: 2022-03-02 | Stop reason: HOSPADM

## 2022-03-01 RX ORDER — POLYETHYLENE GLYCOL 3350 17 G/17G
17 POWDER, FOR SOLUTION ORAL DAILY PRN
Status: DISCONTINUED | OUTPATIENT
Start: 2022-03-01 | End: 2022-03-02 | Stop reason: HOSPADM

## 2022-03-01 RX ADMIN — POLYETHYLENE GLYCOL 3350 17 G: 17 POWDER, FOR SOLUTION ORAL at 21:37

## 2022-03-01 RX ADMIN — FAMOTIDINE 20 MG: 20 TABLET, FILM COATED ORAL at 09:25

## 2022-03-01 RX ADMIN — ACETAMINOPHEN ORAL SOLUTION 650 MG: 650 SOLUTION ORAL at 09:03

## 2022-03-01 RX ADMIN — FAMOTIDINE 20 MG: 20 TABLET, FILM COATED ORAL at 21:37

## 2022-03-01 RX ADMIN — LISINOPRIL 20 MG: 20 TABLET ORAL at 09:25

## 2022-03-01 RX ADMIN — ACETAMINOPHEN 650 MG: 325 TABLET ORAL at 21:37

## 2022-03-01 RX ADMIN — Medication 10 ML: at 21:38

## 2022-03-01 RX ADMIN — ENOXAPARIN SODIUM 30 MG: 30 INJECTION SUBCUTANEOUS at 21:37

## 2022-03-01 RX ADMIN — MAGNESIUM SULFATE HEPTAHYDRATE 4 G: 40 INJECTION, SOLUTION INTRAVENOUS at 09:26

## 2022-03-01 RX ADMIN — SODIUM CHLORIDE 75 ML/HR: 9 INJECTION, SOLUTION INTRAVENOUS at 05:00

## 2022-03-01 RX ADMIN — Medication 5 MG: at 21:37

## 2022-03-01 RX ADMIN — ANTACID TABLETS 2 TABLET: 500 TABLET, CHEWABLE ORAL at 09:25

## 2022-03-01 RX ADMIN — ANTACID TABLETS 2 TABLET: 500 TABLET, CHEWABLE ORAL at 21:37

## 2022-03-01 RX ADMIN — Medication 10 ML: at 09:26

## 2022-03-01 RX ADMIN — ENOXAPARIN SODIUM 30 MG: 30 INJECTION SUBCUTANEOUS at 09:25

## 2022-03-01 NOTE — PROGRESS NOTES
[facility] Medicine Services  PROGRESS NOTE    Patient Name: Davina Delgado  : 1970  MRN: [id number]    Date of Admission: 2022  Primary Care Physician: Nils Spence MD    Subjective   Subjective     CC:  Hypercalcemia    HPI:  No acute events overnight.  States she is overall feeling okay today.  Reviewed pending labs.    ROS:  Gen- No fevers, chills  CV- No chest pain, palpitations  Resp- No cough, dyspnea  GI- No N/V/D, + abd pain      Objective   Objective     Vital Signs:   Temp:  [97.3 °F (36.3 °C)-99.1 °F (37.3 °C)] 98.6 °F (37 °C)  Heart Rate:  [] 112  Resp:  [16-18] 18  BP: (114-147)/() 140/97  Flow (L/min):  [2-4] 2     Physical Exam:  Constitutional: No acute distress, awake, alert  HENT: NCAT, mucous membranes moist  Respiratory: Clear to auscultation bilaterally, respiratory effort normal   Cardiovascular: RRR, no murmurs, rubs, or gallops  Gastrointestinal: Positive bowel sounds, soft, mild tenderness right abd   Musculoskeletal: No bilateral ankle edema  Psychiatric: Appropriate affect, cooperative  Neurologic: Oriented x 3, strength symmetric in all extremities, Cranial Nerves grossly intact to confrontation, speech clear  Skin: No rashes    Results Reviewed:  LAB RESULTS:      Lab 22  0401 22  0404 22  1048 22  1436   WBC 2.43* 5.61 5.11 6.31   HEMOGLOBIN 10.0* 10.7* 12.3 13.1   HEMATOCRIT 32.0* 34.1 38.2 42.0   PLATELETS 295 348 373 378   NEUTROS ABS  --   --  2.53 3.19   IMMATURE GRANS (ABS)  --   --  0.01 0.02   LYMPHS ABS  --   --  1.84 2.37   MONOS ABS  --   --  0.51 0.55   EOS ABS  --   --  0.18 0.14   MCV 88.4 87.9 86.0 88.8         Lab 22  0530 22  1757 22  0401 22  2201 22  1016 22  0447 22  1550 22  1016 22  1538 22  0404 22  2210 22  2210 22  1048 22  1048 22  1436 22  1436   SODIUM  --   --  139  --  137  --    --   --   --  141  --  142  --  140   < > 137   POTASSIUM  --   --  3.9 3.7 3.3*  --  3.6 3.2*   < > 3.5   < > 3.0*   < > 2.8*   < > 2.9*   CHLORIDE  --   --  112*  --  108*  --   --   --   --  109*  --  108*  --  101   < > 95*   CO2  --   --  21.0*  --  22.0  --   --   --   --  25.0  --  25.0  --  30.0*   < > 30.0*   ANION GAP  --   --  6.0  --  7.0  --   --   --   --  7.0  --  9.0  --  9.0   < > 12.0   BUN  --   --  <2*  --  2*  --   --   --   --  2*  --  3*  --  3*   < > 3*   CREATININE  --   --  0.68  --  0.70  --   --   --   --  0.88  --  0.79  --  0.95   < > 0.81   GLUCOSE  --   --  86  --  107*  --   --   --   --  124*  --  114*  --  104*   < > 72   CALCIUM 11.5* 11.7* 12.2*  --  13.2*  --   --  13.0*  --  13.9*   < > 14.8*   < > 16.7*   < > 16.8*   IONIZED CALCIUM  --   --   --   --   --   --   --   --   --   --   --  2.17*  --  2.38*  --   --    MAGNESIUM 1.8  --  1.7  --   --  1.6  --  1.8  --  2.4   < > 1.4*   < > 1.6  --   --    HEMOGLOBIN A1C  --   --   --   --   --   --   --   --   --   --   --   --   --   --   --  4.70*   TSH  --   --   --   --   --   --   --   --   --   --   --   --   --  1.120  --  1.170    < > = values in this interval not displayed.         Lab 02/27/22  1016 02/25/22  0404 02/24/22  2210 02/24/22  1048 02/23/22  1436   TOTAL PROTEIN 6.2  --  7.0 8.0 8.5   ALBUMIN 3.10*  --  3.50  3.2 4.00 4.30   GLOBULIN 3.1  --  3.5 4.0 4.2   ALT (SGPT) 11  --  11 12 13   AST (SGOT) 21  --  21 21 22   BILIRUBIN 0.4  --  0.6 1.0 0.9   ALK PHOS 195* 209* 214* 253* 251*             Lab 02/25/22  0404 02/23/22  1436   CHOLESTEROL 151 178   LDL CHOL 91 109*   HDL CHOL 38* 45   TRIGLYCERIDES 120 137         Lab 02/25/22  0404   IRON 49   IRON SATURATION 21   TIBC 234*   TRANSFERRIN 157*   FOLATE 3.17*   VITAMIN B 12 457         Brief Urine Lab Results  (Last result in the past 365 days)      Color   Clarity   Blood   Leuk Est   Nitrite   Protein   CREAT   Urine HCG        02/28/22 1442                Negative             Microbiology Results Abnormal     Procedure Component Value - Date/Time    Urine Culture - Urine, Urine, Clean Catch [[id number]] Collected: 02/24/22 1033    Lab Status: Final result Specimen: Urine, Clean Catch Updated: 02/25/22 1222     Urine Culture <25,000 CFU/mL Mixed Porsche Isolated    Narrative:      Specimen contains mixed organisms of questionable pathogenicity which indicates contamination with commensal porsche.  Further identification is unlikely to provide clinically useful information.  Suggest recollection.    COVID-19 and FLU A/B PCR - Swab, Nasopharynx [[id number]]  (Normal) Collected: 02/24/22 1034    Lab Status: Final result Specimen: Swab from Nasopharynx Updated: 02/24/22 1124     COVID19 Not Detected     Influenza A PCR Not Detected     Influenza B PCR Not Detected    Narrative:      Fact sheet for providers: https://www.fda.gov/media/550168/download    Fact sheet for patients: https://www.fda.gov/media/384869/download    Test performed by PCR.          No radiology results from the last 24 hrs        I have reviewed the medications:  Scheduled Meds:calcium carbonate, 2 tablet, Oral, BID  enoxaparin, 30 mg, Subcutaneous, Q12H  famotidine, 20 mg, Oral, BID  lisinopril, 20 mg, Oral, Q24H  sodium chloride, 10 mL, Intravenous, Q12H      Continuous Infusions:sodium chloride, 75 mL/hr, Last Rate: 75 mL/hr (03/01/22 0500)      PRN Meds:.•  acetaminophen **OR** acetaminophen **OR** acetaminophen  •  docusate sodium  •  HYDROmorphone **AND** naloxone  •  magnesium sulfate **OR** magnesium sulfate **OR** magnesium sulfate  •  ondansetron **OR** ondansetron  •  oxyCODONE-acetaminophen  •  potassium chloride **OR** potassium chloride **OR** potassium chloride  •  promethazine  •  sodium chloride    Assessment/Plan   Assessment & Plan     Active Hospital Problems    Diagnosis  POA   • Hypercalcemia [E83.52]  Yes   • Weight loss, unintentional [R63.4]  Yes   • Elevated alkaline phosphatase  level [R74.8]  Yes   • Melena [K92.1]  Yes   • Iron deficiency anemia [D50.9]  Yes   • Hyperlipidemia [E78.5]  Yes   • Hypertension [I10]  Yes   • Obesity [E66.9]  Yes      Resolved Hospital Problems   No resolved problems to display.        Brief Hospital Course to date:  Davina Delgado is a 51 y.o. female with a history of hypertension, hyperlipidemia, DEREK, obesity who was admitted with abnormal labs, significant weight loss, loss of appetite.  Notably she was found to have hypercalcemia at 16.7 on admission, and hypokalemia at 2.8 on admission.     Hypercalcemia  -Calcium 16.7, Ionized Ca 2.38 on admission  -s/p 1x dose Pamidronate and calcitonin x 3 doses   -PTH is markedly elevated, awaiting vitamin D and PTH RP.  Based on elevation of PTH likely this is primary hyperthyroidism.  -Sestamibi scan shows asymmetrically increased persistent activity in the left upper thyroid pole which may be thyroid adenoma versus parathyroid adenoma. - Thyroid ultrasound does not show a definite thyroid nodule  -TSH ok  -CT of the chest showed a few tiny pulmonary nodules but no evidence of metastatic disease - indeterminate findings.  The abdomen pelvis showed a likely cyst of the left kidney, however an MRI renal mass protocol was recommended.  Uterus likely show fibroids although further evaluation with MRI of pelvis is recommended given concern of malignancy  - Nephrology and General surgery following  - S/p parathyroidectomy with Dr. Brar 2/28 -- path pending   - Continue to trend Ca levels; at risk for Ca drop    - Contrasted CT or outpatient PET recommended for renal mass and uterine findings      Unintentional weight loss  Melena  Nausea/Vomiting  Fatigue   Decreased Appetite   Severe malnutrition  -H&H normal   -Patient was referred to GI outpatient, likely her symptoms related to hypercalcemia which seems to be secondary to primary hyperPTH. Will need to address this first and likely have f/u outpatient w GI    -N/V likely related to her elevated Calcium   -RD consult, severe malnutrition per their evaluation  - Monitor with improving Ca levels      Hypokalemia  - monitor and repalce PRN     HTN  -No home meds per list   -Added Lisinopril 20mg daily with improvement      HLD  -Not on statin  -lipid panels w/in limits     Elevated Alk Phos  -AST/ALT normal   -trending down     Abnormal UA  -Cx with mixed porsche   -No symptoms currently so hold on ABX     DVT prophylaxis:  Medical and mechanical DVT prophylaxis orders are present.       AM-PAC 6 Clicks Score (PT): 24 (02/28/22 2000)    Disposition: I expect the patient to be discharged TBD    CODE STATUS:   Code Status and Medical Interventions:   Ordered at: 02/28/22 1642     Level Of Support Discussed With:    Patient     Code Status (Patient has no pulse and is not breathing):    CPR (Attempt to Resuscitate)     Medical Interventions (Patient has pulse or is breathing):    Full       uSn Diaz, DO  03/01/22

## 2022-03-01 NOTE — PROGRESS NOTES
" LOS: 5 days   Patient Care Team:  Nils Spence MD as PCP - General (Family Medicine)    Chief Complaint: Hypercalcemia     S/p removal of parathyroid adenoma. Total ca 11.5. Doing well otherwise.       Subjective:  Symptoms:  Improved.  No shortness of breath, chest pain or chest pressure.    Diet:  Adequate intake.  No nausea or vomiting.    Pain:  She complains of pain that is moderate.  She reports pain is worsening.  Pain is partially controlled.        History taken from: patient    Objective     Vital Sign Min/Max for last 24 hours  Temp  Min: 97.3 °F (36.3 °C)  Max: 99.1 °F (37.3 °C)   BP  Min: 114/65  Max: 147/84   Pulse  Min: 76  Max: 112   Resp  Min: 16  Max: 18   SpO2  Min: 93 %  Max: 100 %   Flow (L/min)  Min: 2  Max: 4   No data recorded     Flowsheet Rows      First Filed Value   Admission Height 170.2 cm (67\") Documented at 02/24/2022 0941   Admission Weight 116 kg (256 lb) Documented at 02/24/2022 0941          I/O this shift:  In: 600 [P.O.:600]  Out: -   I/O last 3 completed shifts:  In: 2260 [P.O.:400; I.V.:1860]  Out: -     Objective:  General Appearance:  Comfortable.    Vital signs: (most recent): Blood pressure 140/97, pulse 100, temperature 98.6 °F (37 °C), temperature source Oral, resp. rate 18, height 170.2 cm (67\"), weight 113 kg (249 lb), SpO2 99 %, not currently breastfeeding.  Vital signs are normal.  No fever.    Output: Producing urine.    HEENT: Normal HEENT exam.    Lungs:  Normal effort and normal respiratory rate.  Breath sounds clear to auscultation.  She is not in respiratory distress.  No decreased breath sounds or wheezes.    Heart: Normal rate.  Regular rhythm.  S1 normal and S2 normal.  No murmur or gallop.   Abdomen: Abdomen is soft.    Extremities: Normal range of motion.  There is no dependent edema or local swelling.    Pulses: Distal pulses are intact.    Neurological: Patient is alert and oriented to person, place and time.  Normal strength.    Pupils:  Pupils " are equal, round, and reactive to light.    Skin:  Warm.              Results Review:     I reviewed the patient's new clinical results.    WBC WBC   Date Value Ref Range Status   02/28/2022 2.43 (L) 3.40 - 10.80 10*3/mm3 Final      HGB Hemoglobin   Date Value Ref Range Status   02/28/2022 10.0 (L) 12.0 - 15.9 g/dL Final      HCT Hematocrit   Date Value Ref Range Status   02/28/2022 32.0 (L) 34.0 - 46.6 % Final      Platlets No results found for: LABPLAT   MCV MCV   Date Value Ref Range Status   02/28/2022 88.4 79.0 - 97.0 fL Final          Sodium Sodium   Date Value Ref Range Status   02/28/2022 139 136 - 145 mmol/L Final   02/27/2022 137 136 - 145 mmol/L Final      Potassium Potassium   Date Value Ref Range Status   02/28/2022 3.9 3.5 - 5.2 mmol/L Final   02/27/2022 3.7 3.5 - 5.2 mmol/L Final     Comment:     Slight hemolysis detected by analyzer. Results may be affected.   02/27/2022 3.3 (L) 3.5 - 5.2 mmol/L Final   02/26/2022 3.6 3.5 - 5.2 mmol/L Final      Chloride Chloride   Date Value Ref Range Status   02/28/2022 112 (H) 98 - 107 mmol/L Final   02/27/2022 108 (H) 98 - 107 mmol/L Final      CO2 CO2   Date Value Ref Range Status   02/28/2022 21.0 (L) 22.0 - 29.0 mmol/L Final   02/27/2022 22.0 22.0 - 29.0 mmol/L Final      BUN BUN   Date Value Ref Range Status   02/28/2022 <2 (L) 6 - 20 mg/dL Final   02/27/2022 2 (L) 6 - 20 mg/dL Final      Creatinine Creatinine   Date Value Ref Range Status   02/28/2022 0.68 0.57 - 1.00 mg/dL Final   02/27/2022 0.70 0.57 - 1.00 mg/dL Final      Calcium Calcium   Date Value Ref Range Status   03/01/2022 11.5 (H) 8.6 - 10.5 mg/dL Final   02/28/2022 11.7 (H) 8.6 - 10.5 mg/dL Final   02/28/2022 12.2 (H) 8.6 - 10.5 mg/dL Final   02/27/2022 13.2 (C) 8.6 - 10.5 mg/dL Final      PO4 No results found for: CAPO4   Albumin Albumin   Date Value Ref Range Status   02/27/2022 3.10 (L) 3.50 - 5.20 g/dL Final      Magnesium Magnesium   Date Value Ref Range Status   03/01/2022 1.8 1.6 - 2.6  mg/dL Final   02/28/2022 1.7 1.6 - 2.6 mg/dL Final   02/27/2022 1.6 1.6 - 2.6 mg/dL Final      Uric Acid No results found for: URICACID     Medication Review: yes  Assessment/Plan       Hypertension    Obesity    Hyperlipidemia    Iron deficiency anemia    Weight loss, unintentional    Elevated alkaline phosphatase level    Melena    Hypercalcemia    Severe malnutrition (CMS/HCC)      Assessment & Plan     Hypercalcemia: Total ca 16.8 on admission. Found on routine labs with PCP. Review of old do suggest mild high ca level 10.5-11. No hx of kidney stone. Maria Guadalupe any MVM, Vitamin D or ca supplementation intake. Workup for hypercalcemia on going F/u w  PTHrP, Vitamin D, Vitamin D 1, 25, ACE levels, SPEP, Free light chain ratio, JOSÉ MIGUEL,    Primary hyperpara: Preliminary workup suggestive of high PTH and high joselin. Suspicion high for hyperpara      Hypokalemia: K 2.8 on admission.     Met alkalemia: Bicarb 30 on admission.      Unexpected weight loss: reported 60 pound weight loss in 3-4 months    Renal mass: 2.3cm likely simple cyst. However still need CT w contrast for better characterization. Can be done close to discharge or outpatient.     Recs  Total ca improving. Stable after removal of parathyroid adenoma. Monitor for hypocalcemia post surgery for 24-48 hr. May need ca supplementation and serial ca monitoring  I discussed the patients findings and my recommendations with patient       Sebastián Paredes MD  03/01/22  15:13 EST

## 2022-03-01 NOTE — PROGRESS NOTES
"Patient Name:  Davina Delgado  YOB: 1970  [id number]    Surgery Progress Note    Date of visit: 3/1/2022    Subjective   Patient without acute events overnight. Pain well controlled. No nausea/vomiting. No fevers/chills. Mild tingling in right fingers. Otherwise no numbness/tingling in fingers/toes lips.          Objective       /65 (BP Location: Right arm, Patient Position: Lying)   Pulse 90   Temp 98.6 °F (37 °C) (Oral)   Resp 18   Ht 170.2 cm (67\")   Wt 113 kg (249 lb)   SpO2 96%   BMI 39.00 kg/m²     Intake/Output Summary (Last 24 hours) at 3/1/2022 0751  Last data filed at 2/28/2022 1846  Gross per 24 hour   Intake 2260 ml   Output --   Net 2260 ml       CV:  Rhythm regular and rate regular  L:  Clear to auscultation bilaterally  Abd:  Bowel sounds positive, soft  Ext:  No cyanosis, clubbing, edema  HEENT: neck flat, dressing c/d/i    Recent labs and imaging that are back at this time have been reviewed.   Calcium 11.7 -> 11.5       Assessment/Plan   Pt POD#1 parathyroidectomy, with removal of left superior adenoma  Pain control  OOB, IS, DVT prlx  Serial calcium levels, at risk for continued calcium drop over next 24 hours  Adv diet as tolerated          Dung Brar MD  3/1/2022  07:51 EST      "

## 2022-03-01 NOTE — PROGRESS NOTES
" LOS: 5 days   Patient Care Team:  Nils Spence MD as PCP - General (Family Medicine)    Chief Complaint: Hypercalcemia     S/p removal of parathyroid adenoma. Total ca 11.5. Doing well otherwise.       Subjective:  Symptoms:  Improved.  No shortness of breath, chest pain or chest pressure.    Diet:  Adequate intake.  No nausea or vomiting.    Pain:  She complains of pain that is moderate.  She reports pain is worsening.  Pain is partially controlled.        History taken from: patient    Objective     Vital Sign Min/Max for last 24 hours  Temp  Min: 97.3 °F (36.3 °C)  Max: 99.1 °F (37.3 °C)   BP  Min: 114/65  Max: 147/84   Pulse  Min: 76  Max: 112   Resp  Min: 16  Max: 18   SpO2  Min: 93 %  Max: 100 %   Flow (L/min)  Min: 2  Max: 4   No data recorded     Flowsheet Rows      First Filed Value   Admission Height 170.2 cm (67\") Documented at 02/24/2022 0941   Admission Weight 116 kg (256 lb) Documented at 02/24/2022 0941          I/O this shift:  In: 600 [P.O.:600]  Out: -   I/O last 3 completed shifts:  In: 2260 [P.O.:400; I.V.:1860]  Out: -     Objective:  General Appearance:  Comfortable.    Vital signs: (most recent): Blood pressure 140/97, pulse 100, temperature 98.6 °F (37 °C), temperature source Oral, resp. rate 18, height 170.2 cm (67\"), weight 113 kg (249 lb), SpO2 99 %, not currently breastfeeding.  Vital signs are normal.  No fever.    Output: Producing urine.    HEENT: Normal HEENT exam.    Lungs:  Normal effort and normal respiratory rate.  Breath sounds clear to auscultation.  She is not in respiratory distress.  No decreased breath sounds or wheezes.    Heart: Normal rate.  Regular rhythm.  S1 normal and S2 normal.  No murmur or gallop.   Abdomen: Abdomen is soft.    Extremities: Normal range of motion.  There is no dependent edema or local swelling.    Pulses: Distal pulses are intact.    Neurological: Patient is alert and oriented to person, place and time.  Normal strength.    Pupils:  Pupils " are equal, round, and reactive to light.    Skin:  Warm.              Results Review:     I reviewed the patient's new clinical results.    WBC WBC   Date Value Ref Range Status   02/28/2022 2.43 (L) 3.40 - 10.80 10*3/mm3 Final      HGB Hemoglobin   Date Value Ref Range Status   02/28/2022 10.0 (L) 12.0 - 15.9 g/dL Final      HCT Hematocrit   Date Value Ref Range Status   02/28/2022 32.0 (L) 34.0 - 46.6 % Final      Platlets No results found for: LABPLAT   MCV MCV   Date Value Ref Range Status   02/28/2022 88.4 79.0 - 97.0 fL Final          Sodium Sodium   Date Value Ref Range Status   02/28/2022 139 136 - 145 mmol/L Final   02/27/2022 137 136 - 145 mmol/L Final      Potassium Potassium   Date Value Ref Range Status   02/28/2022 3.9 3.5 - 5.2 mmol/L Final   02/27/2022 3.7 3.5 - 5.2 mmol/L Final     Comment:     Slight hemolysis detected by analyzer. Results may be affected.   02/27/2022 3.3 (L) 3.5 - 5.2 mmol/L Final   02/26/2022 3.6 3.5 - 5.2 mmol/L Final      Chloride Chloride   Date Value Ref Range Status   02/28/2022 112 (H) 98 - 107 mmol/L Final   02/27/2022 108 (H) 98 - 107 mmol/L Final      CO2 CO2   Date Value Ref Range Status   02/28/2022 21.0 (L) 22.0 - 29.0 mmol/L Final   02/27/2022 22.0 22.0 - 29.0 mmol/L Final      BUN BUN   Date Value Ref Range Status   02/28/2022 <2 (L) 6 - 20 mg/dL Final   02/27/2022 2 (L) 6 - 20 mg/dL Final      Creatinine Creatinine   Date Value Ref Range Status   02/28/2022 0.68 0.57 - 1.00 mg/dL Final   02/27/2022 0.70 0.57 - 1.00 mg/dL Final      Calcium Calcium   Date Value Ref Range Status   03/01/2022 11.5 (H) 8.6 - 10.5 mg/dL Final   02/28/2022 11.7 (H) 8.6 - 10.5 mg/dL Final   02/28/2022 12.2 (H) 8.6 - 10.5 mg/dL Final   02/27/2022 13.2 (C) 8.6 - 10.5 mg/dL Final      PO4 No results found for: CAPO4   Albumin Albumin   Date Value Ref Range Status   02/27/2022 3.10 (L) 3.50 - 5.20 g/dL Final      Magnesium Magnesium   Date Value Ref Range Status   03/01/2022 1.8 1.6 - 2.6  mg/dL Final   02/28/2022 1.7 1.6 - 2.6 mg/dL Final   02/27/2022 1.6 1.6 - 2.6 mg/dL Final      Uric Acid No results found for: URICACID     Medication Review: yes  Assessment/Plan       Hypertension    Obesity    Hyperlipidemia    Iron deficiency anemia    Weight loss, unintentional    Elevated alkaline phosphatase level    Melena    Hypercalcemia    Severe malnutrition (CMS/HCC)      Assessment & Plan     Hypercalcemia: Total ca 16.8 on admission. Found on routine labs with PCP. Review of old do suggest mild high ca level 10.5-11. No hx of kidney stone. Maria Guadalupe any MVM, Vitamin D or ca supplementation intake. Workup for hypercalcemia on going F/u w  PTHrP, Vitamin D, Vitamin D 1, 25, ACE levels, SPEP, Free light chain ratio, JOSÉ MIGUEL,    Primary hyperpara: Preliminary workup suggestive of high PTH and high joselin. Suspicion high for hyperpara      Hypokalemia: K 2.8 on admission.     Met alkalemia: Bicarb 30 on admission.      Unexpected weight loss: reported 60 pound weight loss in 3-4 months     Recs  Total ca improving. Stable after removal of parathyroid adenoma. Monitor for hypocalcemia post surgery for 24-48 hr. May need ca supplementation and serial ca monitoring  I discussed the patients findings and my recommendations with patient       Sebastián Paredes MD  03/01/22  15:11 EST

## 2022-03-01 NOTE — PLAN OF CARE
Goal Outcome Evaluation:  Plan of Care Reviewed With: patient        Progress: improving  Outcome Summary: vss, SR on tele, minimal pain which responded well to tylenol liquid, neck dressing dry and intact. tolerating full liquids, prefers to stay at that diet for now..

## 2022-03-02 ENCOUNTER — READMISSION MANAGEMENT (OUTPATIENT)
Dept: CALL CENTER | Facility: HOSPITAL | Age: 52
End: 2022-03-02

## 2022-03-02 VITALS
OXYGEN SATURATION: 97 % | WEIGHT: 249 LBS | DIASTOLIC BLOOD PRESSURE: 67 MMHG | TEMPERATURE: 98.1 F | HEART RATE: 85 BPM | SYSTOLIC BLOOD PRESSURE: 121 MMHG | BODY MASS INDEX: 39.08 KG/M2 | RESPIRATION RATE: 18 BRPM | HEIGHT: 67 IN

## 2022-03-02 PROBLEM — E21.3 HYPERPARATHYROIDISM (HCC): Status: ACTIVE | Noted: 2022-03-02

## 2022-03-02 LAB
ANION GAP SERPL CALCULATED.3IONS-SCNC: 8 MMOL/L (ref 5–15)
BUN SERPL-MCNC: 4 MG/DL (ref 6–20)
BUN/CREAT SERPL: 4.3 (ref 7–25)
CALCIUM SPEC-SCNC: 11.2 MG/DL (ref 8.6–10.5)
CHLORIDE SERPL-SCNC: 105 MMOL/L (ref 98–107)
CO2 SERPL-SCNC: 23 MMOL/L (ref 22–29)
CREAT SERPL-MCNC: 0.93 MG/DL (ref 0.57–1)
CYTO UR: NORMAL
DEPRECATED RDW RBC AUTO: 41.7 FL (ref 37–54)
EGFRCR SERPLBLD CKD-EPI 2021: 74.1 ML/MIN/1.73
ERYTHROCYTE [DISTWIDTH] IN BLOOD BY AUTOMATED COUNT: 12.8 % (ref 12.3–15.4)
GLUCOSE SERPL-MCNC: 93 MG/DL (ref 65–99)
HCT VFR BLD AUTO: 33.2 % (ref 34–46.6)
HGB BLD-MCNC: 10.3 G/DL (ref 12–15.9)
LAB AP CASE REPORT: NORMAL
LAB AP CLINICAL INFORMATION: NORMAL
Lab: NORMAL
MAGNESIUM SERPL-MCNC: 1.7 MG/DL (ref 1.6–2.6)
MCH RBC QN AUTO: 27.6 PG (ref 26.6–33)
MCHC RBC AUTO-ENTMCNC: 31 G/DL (ref 31.5–35.7)
MCV RBC AUTO: 89 FL (ref 79–97)
PATH REPORT.FINAL DX SPEC: NORMAL
PATH REPORT.GROSS SPEC: NORMAL
PLATELET # BLD AUTO: 324 10*3/MM3 (ref 140–450)
PMV BLD AUTO: 9.2 FL (ref 6–12)
POTASSIUM SERPL-SCNC: 3.9 MMOL/L (ref 3.5–5.2)
PTH RELATED PROT SERPL-SCNC: <2 PMOL/L
PTH-INTACT SERPL-MCNC: 7.7 PG/ML (ref 15–65)
RBC # BLD AUTO: 3.73 10*6/MM3 (ref 3.77–5.28)
SODIUM SERPL-SCNC: 136 MMOL/L (ref 136–145)
WBC NRBC COR # BLD: 4.6 10*3/MM3 (ref 3.4–10.8)

## 2022-03-02 PROCEDURE — 25010000002 ENOXAPARIN PER 10 MG: Performed by: SURGERY

## 2022-03-02 PROCEDURE — 83735 ASSAY OF MAGNESIUM: CPT | Performed by: SURGERY

## 2022-03-02 PROCEDURE — 80048 BASIC METABOLIC PNL TOTAL CA: CPT | Performed by: INTERNAL MEDICINE

## 2022-03-02 PROCEDURE — 85027 COMPLETE CBC AUTOMATED: CPT | Performed by: INTERNAL MEDICINE

## 2022-03-02 PROCEDURE — 83970 ASSAY OF PARATHORMONE: CPT | Performed by: SURGERY

## 2022-03-02 PROCEDURE — 0 MAGNESIUM SULFATE 4 GM/100ML SOLUTION: Performed by: SURGERY

## 2022-03-02 PROCEDURE — 99239 HOSP IP/OBS DSCHRG MGMT >30: CPT | Performed by: INTERNAL MEDICINE

## 2022-03-02 RX ORDER — LISINOPRIL 20 MG/1
20 TABLET ORAL
Qty: 30 TABLET | Refills: 0 | Status: SHIPPED | OUTPATIENT
Start: 2022-03-03 | End: 2022-03-07 | Stop reason: HOSPADM

## 2022-03-02 RX ORDER — CALCIUM CARBONATE 200(500)MG
2 TABLET,CHEWABLE ORAL 2 TIMES DAILY
Qty: 120 TABLET | Refills: 0 | Status: ON HOLD | OUTPATIENT
Start: 2022-03-02 | End: 2022-03-07 | Stop reason: SDUPTHER

## 2022-03-02 RX ADMIN — Medication 10 ML: at 09:21

## 2022-03-02 RX ADMIN — ANTACID TABLETS 2 TABLET: 500 TABLET, CHEWABLE ORAL at 09:20

## 2022-03-02 RX ADMIN — FAMOTIDINE 20 MG: 20 TABLET, FILM COATED ORAL at 09:20

## 2022-03-02 RX ADMIN — LISINOPRIL 20 MG: 20 TABLET ORAL at 09:20

## 2022-03-02 RX ADMIN — ENOXAPARIN SODIUM 30 MG: 30 INJECTION SUBCUTANEOUS at 09:20

## 2022-03-02 RX ADMIN — MAGNESIUM SULFATE HEPTAHYDRATE 4 G: 40 INJECTION, SOLUTION INTRAVENOUS at 09:21

## 2022-03-02 RX ADMIN — ACETAMINOPHEN 650 MG: 325 TABLET ORAL at 05:31

## 2022-03-02 NOTE — DISCHARGE SUMMARY
[facility] Medicine Services  DISCHARGE SUMMARY    Patient Name: Davina Delgado  : 1970  MRN: [id number]    Date of Admission: 2022 10:22 AM  Date of Discharge:  22  Primary Care Physician: Nils Spence MD    Consults     Date and Time Order Name Status Description    2022  1:26 PM Inpatient Nephrology Consult            Hospital Course     Presenting Problem:   Hypercalcemia [E83.52]    Active Hospital Problems    Diagnosis  POA   • Hyperparathyroidism (HCC) [E21.3]  Unknown   • Severe malnutrition (CMS/HCC) [E43]  Yes   • Hypercalcemia [E83.52]  Yes   • Weight loss, unintentional [R63.4]  Yes   • Elevated alkaline phosphatase level [R74.8]  Yes   • Melena [K92.1]  Yes   • Iron deficiency anemia [D50.9]  Yes   • Hyperlipidemia [E78.5]  Yes   • Hypertension [I10]  Yes   • Obesity [E66.9]  Yes      Resolved Hospital Problems   No resolved problems to display.          Hospital Course:  Davina Delgado is a 51 y.o. female with a history of hypertension, hyperlipidemia, DEREK, obesity who was admitted with abnormal labs, significant weight loss, loss of appetite.  Notably she was found to have hypercalcemia at 16.7 on admission, and hypokalemia at 2.8 on admission.     Hypercalcemia  Left parathyroid adenoma   -Calcium 16.7, Ionized Ca 2.38 on admission  -s/p 1x dose Pamidronate and calcitonin x 3 doses   -PTH is markedly elevated.  Based on elevation of PTH likely this is primary hyperthyroidism.  -Sestamibi scan shows asymmetrically increased persistent activity in the left upper thyroid pole which may be thyroid adenoma versus parathyroid adenoma. - Thyroid ultrasound does not show a definite thyroid nodule  -TSH ok  -CT of the chest showed a few tiny pulmonary nodules but no evidence of metastatic disease - indeterminate findings.  The abdomen pelvis showed a likely cyst of the left kidney, and likely fibroids  - Nephrology and General surgery consulted    - S/p parathyroidectomy with removal of left superior ademona with Dr. Brar 2/28 -- path pending   - Ca levels remained stable; she will be discharged on Tums  - Contrasted CT or outpatient PET recommended for renal mass and uterine findings -- Reviewed with Dr. Brar and he will help coordinate as an outpatient   **Reviewed with patient that pathology is still currently pending from her parathyroidectomy.  Also reviewed findings on imaging that needs followed up.  She states that overall she feels better and she is comfortable with discharge today.  Reviewed need for close follow-up with PCP as well as general surgery.     Unintentional weight loss  Melena  Nausea/Vomiting  Fatigue   Decreased Appetite   Severe malnutrition  -H&H normal   -Patient was referred to GI outpatient, likely her symptoms related to hypercalcemia which seems to be secondary to primary hyperPTH.   -N/V likely related to her elevated Calcium   -Overall her nutrition status improved and she was able to tolerate more p.o. intake during her hospitalization.     Hypokalemia  - monitor and repalce PRN      HTN  -No home meds per list   -Added Lisinopril 20mg daily with improvement; will continue on discharge     HLD  -Not on statin  -lipid panels w/in limits     Elevated Alk Phos  -AST/ALT normal   -trending down     Abnormal UA  -Cx with mixed porsche   -No symptoms currently so hold on ABX       Discharge Follow Up Recommendations for outpatient labs/diagnostics:  PCP Dr. Spence 1 week   General Surgery Dr. Brar 2 weeks     Day of Discharge     HPI:   No acute events.  States that she is overall feeling better.  She is tolerating more food and her GI symptoms seem to be improved.  Still has aches and pains in her proximal muscle groups.  States that she has been able to move but does move slowly.  Reviewed pending pathology as well as other findings on imaging.  Reviewed need for close outpatient follow-up and need for outpatient  imaging.  Patient feels comfortable and ready for discharge.  Reviewed need for pain medication on discharge and patient declines.    Review of Systems  Gen- No fevers, chills  CV- No chest pain, palpitations  Resp- No cough, dyspnea  GI- No N/V/D, abd pain      Vital Signs:   Temp:  [98.1 °F (36.7 °C)] 98.1 °F (36.7 °C)  Heart Rate:  [] 87  Resp:  [18] 18  BP: (111-127)/(78-86) 111/78      Physical Exam:  Constitutional: No acute distress, awake, alert  HENT: NCAT, mucous membranes moist  Respiratory: Clear to auscultation bilaterally, respiratory effort normal   Cardiovascular: RRR, no murmurs, rubs, or gallops  Gastrointestinal: Positive bowel sounds, soft, nontender, nondistended  Musculoskeletal: No bilateral ankle edema  Psychiatric: Appropriate affect, cooperative  Neurologic: Oriented x 3, strength symmetric in all extremities, Cranial Nerves grossly intact to confrontation, speech clear  Skin: No rashes    Pertinent  and/or Most Recent Results     LAB RESULTS:      Lab 03/02/22  0533 02/28/22  0401 02/25/22  0404 02/24/22  1048 02/23/22  1436   WBC 4.60 2.43* 5.61 5.11 6.31   HEMOGLOBIN 10.3* 10.0* 10.7* 12.3 13.1   HEMATOCRIT 33.2* 32.0* 34.1 38.2 42.0   PLATELETS 324 295 348 373 378   NEUTROS ABS  --   --   --  2.53 3.19   IMMATURE GRANS (ABS)  --   --   --  0.01 0.02   LYMPHS ABS  --   --   --  1.84 2.37   MONOS ABS  --   --   --  0.51 0.55   EOS ABS  --   --   --  0.18 0.14   MCV 89.0 88.4 87.9 86.0 88.8         Lab 03/02/22  0533 03/01/22  2226 03/01/22  1453 03/01/22  0530 02/28/22  1757 02/28/22  0401 02/28/22  0401 02/27/22  2201 02/27/22  1016 02/27/22  1016 02/27/22  0447 02/26/22  1550 02/26/22  1016 02/26/22  1016 02/25/22  1538 02/25/22  0404 02/24/22  2210 02/24/22  2210 02/24/22  1048 02/24/22  1048 02/23/22  1436 02/23/22  1436   SODIUM 136  --   --   --   --   --  139  --   --  137  --   --   --   --   --  141  --  142   < > 140   < > 137   POTASSIUM 3.9  --   --   --   --   --  3.9  3.7  --  3.3*  --  3.6   < > 3.2*   < > 3.5   < > 3.0*   < > 2.8*   < > 2.9*   CHLORIDE 105  --   --   --   --   --  112*  --   --  108*  --   --   --   --   --  109*  --  108*   < > 101   < > 95*   CO2 23.0  --   --   --   --   --  21.0*  --   --  22.0  --   --   --   --   --  25.0  --  25.0   < > 30.0*   < > 30.0*   ANION GAP 8.0  --   --   --   --   --  6.0  --   --  7.0  --   --   --   --   --  7.0  --  9.0   < > 9.0   < > 12.0   BUN 4*  --   --   --   --   --  <2*  --   --  2*  --   --   --   --   --  2*  --  3*   < > 3*   < > 3*   CREATININE 0.93  --   --   --   --   --  0.68  --   --  0.70  --   --   --   --   --  0.88  --  0.79   < > 0.95   < > 0.81   EGFR 74.1  --   --   --   --   --   --   --   --   --   --   --   --   --   --   --   --   --   --   --   --   --    GLUCOSE 93  --   --   --   --   --  86  --   --  107*  --   --   --   --   --  124*  --  114*   < > 104*   < > 72   CALCIUM 11.2* 10.9* 11.1* 11.5* 11.7*   < > 12.2*  --    < > 13.2*  --   --    < > 13.0*  --  13.9*   < > 14.8*   < > 16.7*   < > 16.8*   IONIZED CALCIUM  --   --   --   --   --   --   --   --   --   --   --   --   --   --   --   --   --  2.17*  --  2.38*  --   --    MAGNESIUM 1.7  --   --  1.8  --   --  1.7  --   --   --  1.6  --   --  1.8  --  2.4   < > 1.4*   < > 1.6  --   --    HEMOGLOBIN A1C  --   --   --   --   --   --   --   --   --   --   --   --   --   --   --   --   --   --   --   --   --  4.70*   TSH  --   --   --   --   --   --   --   --   --   --   --   --   --   --   --   --   --   --   --  1.120  --  1.170    < > = values in this interval not displayed.         Lab 02/27/22  1016 02/25/22  0404 02/24/22  2210 02/24/22  1048 02/23/22  1436   TOTAL PROTEIN 6.2  --  7.0 8.0 8.5   ALBUMIN 3.10*  --  3.50  3.2 4.00 4.30   GLOBULIN 3.1  --  3.5 4.0 4.2   ALT (SGPT) 11  --  11 12 13   AST (SGOT) 21  --  21 21 22   BILIRUBIN 0.4  --  0.6 1.0 0.9   ALK PHOS 195* 209* 214* 253* 251*             Lab 02/25/22  0405  02/23/22  1436   CHOLESTEROL 151 178   LDL CHOL 91 109*   HDL CHOL 38* 45   TRIGLYCERIDES 120 137         Lab 02/25/22  0404   IRON 49   IRON SATURATION 21   TIBC 234*   TRANSFERRIN 157*   FOLATE 3.17*   VITAMIN B 12 457         Brief Urine Lab Results  (Last result in the past 365 days)      Color   Clarity   Blood   Leuk Est   Nitrite   Protein   CREAT   Urine HCG        02/28/22 1442               Negative           Microbiology Results (last 10 days)     Procedure Component Value - Date/Time    COVID-19 and FLU A/B PCR - Swab, Nasopharynx [[id number]]  (Normal) Collected: 02/24/22 1034    Lab Status: Final result Specimen: Swab from Nasopharynx Updated: 02/24/22 1124     COVID19 Not Detected     Influenza A PCR Not Detected     Influenza B PCR Not Detected    Narrative:      Fact sheet for providers: https://www.fda.gov/media/870545/download    Fact sheet for patients: https://www.fda.gov/media/923851/download    Test performed by PCR.    Urine Culture - Urine, Urine, Clean Catch [[id number]] Collected: 02/24/22 1033    Lab Status: Final result Specimen: Urine, Clean Catch Updated: 02/25/22 1222     Urine Culture <25,000 CFU/mL Mixed Porsche Isolated    Narrative:      Specimen contains mixed organisms of questionable pathogenicity which indicates contamination with commensal porsche.  Further identification is unlikely to provide clinically useful information.  Suggest recollection.          CT Abdomen Pelvis Without Contrast    Result Date: 2/24/2022  CT CHEST WO CONTRAST DIAGNOSTIC-, CT ABDOMEN PELVIS WO CONTRAST-  Date of Exam: 2/24/2022 3:05 PM  Indication: weight loss, hypercalcemia.  Comparison: None available.  Technique: Serial and axial CT images of the chest were obtained. Reconstructions in the coronal and sagittal planes were performed. Automated exposure control and iterative reconstruction methods were used.  Radiation audit for number of CT and nuclear cardiology exams performed in the last year:  0.   FINDINGS:  Chest:  Noncalcified 5 mm right lower lobe pulmonary nodule image 50.  Nodule along the major fissure measuring 3 mm on image 50, possibly a lymph node.  3 mm nodule along the right major fissure image 44 likely a lymph node.  Lungs elsewhere are clear. No pleural effusion.  Normal-sized lymph nodes of the mediastinum and elsewhere in the chest, no evidence of adenopathy. No evidence of acute process in the mediastinum on noncontrast evaluation. No pericardial effusion.  No suspicious lytic or blastic bone lesions identified.  Abdomen/pelvis:  Limited evaluation of the abdomen organs due to lack of contrast. 2.3 cm low-density lesion of the left kidney is present. Hounsfield values near 20. No noncontrast CT evidence of malignancy or metastatic disease within the organs identified elsewhere. No evidence of acute process of the organs elsewhere. No evidence of pancreatitis. No ureteral stone or hydronephrosis either side. No nephrolithiasis of either kidney. No evidence of cholecystitis.  No bowel obstruction or other acute findings of bowel throughout the abdomen and pelvis.  Normal sized lymph nodes throughout the abdomen and pelvis, no evidence of adenopathy. No ascites.  Irregular calcific lesions are present in the posterior uterine body which appear myometrial. There is myometrial mass just inferior to the calcifications shown on sagittal image 91 measuring approximately 3 cm.  No acute or aggressive osseous findings, no findings of osseous metastatic disease.        Chest:  There are a few tiny pulmonary nodules measuring 5 mm and less, indeterminate. No evidence of lucency or metastatic disease otherwise. No acute abnormality of the chest.  Abdomen/pelvis:  2.3 cm low-density lesion of the left kidney is most likely a cyst but incompletely evaluated on noncontrast CT. Recommend pre and postcontrast CT or MRI renal mass protocol examination, especially with the provided history of suspected  malignancy.  Findings of the ureters are most likely related to fibroids although the calcified masslike area potentially may relate to an endometrial lesion. In the setting of suspected malignancy further evaluation with MRI of the pelvis is recommended, or surgical consultation.  Overall limited evaluation for malignancy given lack of IV contrast. As part of further workup/follow-up contrast enhanced CT and/or PET/CT is recommended for the above.       This report was finalized on 2/24/2022 4:11 PM by Bogdan Khan.      CT Chest Without Contrast Diagnostic    Result Date: 2/24/2022  CT CHEST WO CONTRAST DIAGNOSTIC-, CT ABDOMEN PELVIS WO CONTRAST-  Date of Exam: 2/24/2022 3:05 PM  Indication: weight loss, hypercalcemia.  Comparison: None available.  Technique: Serial and axial CT images of the chest were obtained. Reconstructions in the coronal and sagittal planes were performed. Automated exposure control and iterative reconstruction methods were used.  Radiation audit for number of CT and nuclear cardiology exams performed in the last year:  0.  FINDINGS:  Chest:  Noncalcified 5 mm right lower lobe pulmonary nodule image 50.  Nodule along the major fissure measuring 3 mm on image 50, possibly a lymph node.  3 mm nodule along the right major fissure image 44 likely a lymph node.  Lungs elsewhere are clear. No pleural effusion.  Normal-sized lymph nodes of the mediastinum and elsewhere in the chest, no evidence of adenopathy. No evidence of acute process in the mediastinum on noncontrast evaluation. No pericardial effusion.  No suspicious lytic or blastic bone lesions identified.  Abdomen/pelvis:  Limited evaluation of the abdomen organs due to lack of contrast. 2.3 cm low-density lesion of the left kidney is present. Hounsfield values near 20. No noncontrast CT evidence of malignancy or metastatic disease within the organs identified elsewhere. No evidence of acute process of the organs elsewhere. No evidence of  pancreatitis. No ureteral stone or hydronephrosis either side. No nephrolithiasis of either kidney. No evidence of cholecystitis.  No bowel obstruction or other acute findings of bowel throughout the abdomen and pelvis.  Normal sized lymph nodes throughout the abdomen and pelvis, no evidence of adenopathy. No ascites.  Irregular calcific lesions are present in the posterior uterine body which appear myometrial. There is myometrial mass just inferior to the calcifications shown on sagittal image 91 measuring approximately 3 cm.  No acute or aggressive osseous findings, no findings of osseous metastatic disease.        Chest:  There are a few tiny pulmonary nodules measuring 5 mm and less, indeterminate. No evidence of lucency or metastatic disease otherwise. No acute abnormality of the chest.  Abdomen/pelvis:  2.3 cm low-density lesion of the left kidney is most likely a cyst but incompletely evaluated on noncontrast CT. Recommend pre and postcontrast CT or MRI renal mass protocol examination, especially with the provided history of suspected malignancy.  Findings of the ureters are most likely related to fibroids although the calcified masslike area potentially may relate to an endometrial lesion. In the setting of suspected malignancy further evaluation with MRI of the pelvis is recommended, or surgical consultation.  Overall limited evaluation for malignancy given lack of IV contrast. As part of further workup/follow-up contrast enhanced CT and/or PET/CT is recommended for the above.       This report was finalized on 2/24/2022 4:11 PM by Bogdan Khan.      NM Parathyroid Scan    Result Date: 2/25/2022  DATE OF EXAM: 02/25/2022 11:05 AM  PROCEDURE: NM PARATHYROID SCAN-  INDICATIONS: Hyperparathyroidism; E83.52-Hypercalcemia; E87.6-Hypokalemia; R53.1-Weakness; R63.4-Abnormal weight loss  COMPARISON: No Comparisons Available.  TECHNIQUE: 21.2 mCi of 99m labeled sestamibi was administered intravenously followed by  20-minute and two hour delayed static scintigraphic imaging of the area of the parathyroid glands  FINDINGS: Initial 20-minute images show a mildly heterogeneous pattern of uptake in the thyroid, a little greater in the left upper thyroid pole than elsewhere. There is usual activity in the submandibular glands but no other focus of increased activity. Delayed images show some washout of activity from the thyroid in general, but persistent activity in the upper-mid left thyroid pole. No other focus of increased activity is seen.      Asymmetrically increased and persistent activity in the left upper thyroid pole, whether thyroid adenoma or parathyroid adenoma.  This report was finalized on 2/25/2022 5:30 PM by Dr. Alexander Haley MD.      US Thyroid    Result Date: 2/25/2022  US THYROID-  Date of Exam: 2/25/2022 6:51 PM  Indication: patient with significant hyperparathyroidism, assess for parathyroid adenomas; E83.52-Hypercalcemia; E87.6-Hypokalemia; R53.1-Weakness; R63.4-Abnormal weight loss.  Comparison: Parathyroid scan performed today  Technique: Grayscale and color Doppler imaging of the thyroid gland was performed.  FINDINGS: The thyroid gland has homogenous echogenicity and echotexture.  Normal blood flow is present.  No significant focal nodules or masses.  No suspicious cervical chain lymph nodes.   The right lobe measures 5.8 x 1.8 x 2.4 cm.  The left lobe measures 5.4 x 1.6 x 2.2 cm.  The isthmus measures 6 mm.   Mild diffuse enlargement of the thyroid gland. Vascularity within normal limits. Within the right upper lobe there is suggestion of a tiny subtle 5 mm x 4 mm x 5 mm hypoechoic nodule although not definitive. There is artifact which shadowing.  No definitive parathyroid adenoma, although limited evaluation due to shadowing, there is poor visualization of portions of the tissues around the thyroid gland due to the shadowing.      Mild diffuse thyroid gland enlargement with normal vascularity.  Possible  tiny right-sided thyroid 5 mm nodule although this is suspected to be artifactual.  No abnormality to correspond to the suggested uptake of the left upper thyroid pole region from the parathyroid scan earlier today. There is no thyroid nodule in this location. No definitive evidence of a parathyroid nodule although this ultrasound is technically limited for detection of parathyroid adenoma.  This report was finalized on 2/25/2022 8:06 PM by Bogdan Khan.      XR Chest 1 View    Result Date: 2/24/2022  DATE OF EXAM: 2/24/2022 10:37 AM  PROCEDURE: XR CHEST 1 VW-  INDICATIONS: Unintentional weight loss  COMPARISON: September 22, 2016  TECHNIQUE: Single radiographic AP view of the chest was obtained.  FINDINGS: The heart is not enlarged. The lungs seem clear. There are no pleural effusions. The visualized osseous structures do not appear unusual.      1.  An acute pulmonary process is not apparent.  This report was finalized on 2/24/2022 10:50 AM by Farooq Lemos MD.        Results for orders placed in visit on 01/03/18    SCANNED VASCULAR STUDIES      Results for orders placed in visit on 01/03/18    SCANNED VASCULAR STUDIES          Plan for Follow-up of Pending Labs/Results:   Pending Labs     Order Current Status    PTH-related Peptide In process        Discharge Details        Discharge Medications      New Medications      Instructions Start Date   calcium carbonate 500 MG chewable tablet  Commonly known as: TUMS   2 tablets, Oral, 2 Times Daily      lisinopril 20 MG tablet  Commonly known as: PRINIVIL,ZESTRIL   20 mg, Oral, Every 24 Hours Scheduled   Start Date: March 3, 2022        Continue These Medications      Instructions Start Date   Multiple Vitamin Essential tablet tablet   1 tablet, Oral, Daily, OTC      ondansetron 4 MG tablet  Commonly known as: Zofran   4 mg, Oral, Every 8 Hours PRN      Vitamin D (Ergocalciferol) 50 MCG (2000 UT) capsule   2,000 Units, Oral, Daily, OTC             No Known  Allergies      Discharge Disposition:  Home or Self Care    Diet:  Hospital:  Diet Order   Procedures   • Diet Soft Texture; Whole Foods; GI Soft       Activity:  Activity Instructions     Activity as Tolerated            Restrictions or Other Recommendations:         CODE STATUS:    Code Status and Medical Interventions:   Ordered at: 02/28/22 1642     Level Of Support Discussed With:    Patient     Code Status (Patient has no pulse and is not breathing):    CPR (Attempt to Resuscitate)     Medical Interventions (Patient has pulse or is breathing):    Full       Future Appointments   Date Time Provider Department Center   3/29/2022 11:30 AM Zeus Mejía MD [facility]   5/25/2022  9:30 AM Nils Spence MD [facility]       Additional Instructions for the Follow-ups that You Need to Schedule     Discharge Follow-up with PCP   As directed       Currently Documented PCP:    Nils Spence MD    PCP Phone Number:    [phone]     Follow Up Details: PCP Dr. Spence 1 week         Discharge Follow-up with Specified Provider: General Surgery Dr. Brar 2 weeks   As directed      To: General Surgery Dr. Brar 2 weeks                     Sun Diaz DO  03/02/22      Time Spent on Discharge:  I spent  40  minutes on this discharge activity which included: face-to-face encounter with the patient, reviewing the data in the system, coordination of the care with the nursing staff as well as consultants, documentation, and entering orders.

## 2022-03-02 NOTE — CASE MANAGEMENT/SOCIAL WORK
Continued Stay Note   [place]     Patient Name: Davina Delgado  MRN: [id number]  Today's Date: 3/2/2022    Admit Date: 2/24/2022     Discharge Plan     Row Name 03/02/22 8482       Plan    Plan Home    Patient/Family in Agreement with Plan yes    Plan Comments I have met with Ms. Delgado at the bedside to discuss today's discharge.  She states that she plans to return home and denies having any discharge needs.  She plans to drive her car home today.  She denies taking narcotics today.  Please call  ext [phone] if needs transportation.    Final Discharge Disposition Code 01 - home or self-care               Discharge Codes    No documentation.               Expected Discharge Date and Time     Expected Discharge Date Expected Discharge Time    Mar 2, 2022             Jada Burnham RN

## 2022-03-02 NOTE — PROGRESS NOTES
"Patient Name:  Davina Delgado  YOB: 1970  [id number]    Surgery Progress Note    Date of visit: 3/2/2022    Subjective   Patient without incisional pain; some lower extremity aching; tolerating diet without nausea/vomiting. No fevers/chills. No numbness/tingling in fingers/toes/lips.         Objective       /86 (BP Location: Right arm, Patient Position: Sitting)   Pulse 77   Temp 98.1 °F (36.7 °C) (Oral)   Resp 18   Ht 170.2 cm (67\")   Wt 113 kg (249 lb)   SpO2 97%   BMI 39.00 kg/m²     Intake/Output Summary (Last 24 hours) at 3/2/2022 0854  Last data filed at 3/1/2022 1700  Gross per 24 hour   Intake 960 ml   Output --   Net 960 ml       CV:  Rhythm regular and rate regular  L:  Clear to auscultation bilaterally  Abd:  Bowel sounds positive, soft  Ext:  No cyanosis, clubbing, edema  HEENT: neck flat, inc c/d/i    Recent labs and imaging that are back at this time have been reviewed.   Serum calcium 11.2       Assessment/Plan     Pt POD#2 parathyroidectomy, with removal of left superior adenoma  Pain control  OOB, IS, DVT prlx  Calcium level has remained relatively stable post op -> OK for DC planning from surgical perspective, will see her back in 2 weeks.           Dung Brar MD  3/2/2022  08:54 EST      "

## 2022-03-02 NOTE — PROGRESS NOTES
" LOS: 6 days   Patient Care Team:  Nils Spence MD as PCP - General (Family Medicine)    Chief Complaint: Hypercalcemia     S/p removal of parathyroid adenoma. Total ca 11.5. Doing well otherwise.       Subjective:  Symptoms:  Improved.  No shortness of breath, chest pain or chest pressure.    Diet:  Adequate intake.  No nausea or vomiting.    Pain:  She complains of pain that is moderate.  She reports pain is worsening.  Pain is partially controlled.        History taken from: patient    Objective     Vital Sign Min/Max for last 24 hours  Temp  Min: 98.1 °F (36.7 °C)  Max: 98.1 °F (36.7 °C)   BP  Min: 111/78  Max: 127/86   Pulse  Min: 77  Max: 100   Resp  Min: 18  Max: 18   SpO2  Min: 97 %  Max: 97 %   No data recorded   No data recorded     Flowsheet Rows      First Filed Value   Admission Height 170.2 cm (67\") Documented at 02/24/2022 0941   Admission Weight 116 kg (256 lb) Documented at 02/24/2022 0941          I/O this shift:  In: 360 [P.O.:360]  Out: -   I/O last 3 completed shifts:  In: 960 [P.O.:960]  Out: -     Objective:  General Appearance:  Comfortable.    Vital signs: (most recent): Blood pressure 121/67, pulse 85, temperature 98.1 °F (36.7 °C), temperature source Oral, resp. rate 18, height 170.2 cm (67\"), weight 113 kg (249 lb), SpO2 97 %, not currently breastfeeding.  Vital signs are normal.  No fever.    Output: Producing urine.    HEENT: Normal HEENT exam.    Lungs:  Normal effort and normal respiratory rate.  Breath sounds clear to auscultation.  She is not in respiratory distress.  No decreased breath sounds or wheezes.    Heart: Normal rate.  Regular rhythm.  S1 normal and S2 normal.  No murmur or gallop.   Abdomen: Abdomen is soft.    Extremities: Normal range of motion.  There is no dependent edema or local swelling.    Pulses: Distal pulses are intact.    Neurological: Patient is alert and oriented to person, place and time.  Normal strength.    Pupils:  Pupils are equal, round, and " reactive to light.    Skin:  Warm.              Results Review:     I reviewed the patient's new clinical results.    WBC WBC   Date Value Ref Range Status   03/02/2022 4.60 3.40 - 10.80 10*3/mm3 Final   02/28/2022 2.43 (L) 3.40 - 10.80 10*3/mm3 Final      HGB Hemoglobin   Date Value Ref Range Status   03/02/2022 10.3 (L) 12.0 - 15.9 g/dL Final   02/28/2022 10.0 (L) 12.0 - 15.9 g/dL Final      HCT Hematocrit   Date Value Ref Range Status   03/02/2022 33.2 (L) 34.0 - 46.6 % Final   02/28/2022 32.0 (L) 34.0 - 46.6 % Final      Platlets No results found for: LABPLAT   MCV MCV   Date Value Ref Range Status   03/02/2022 89.0 79.0 - 97.0 fL Final   02/28/2022 88.4 79.0 - 97.0 fL Final          Sodium Sodium   Date Value Ref Range Status   03/02/2022 136 136 - 145 mmol/L Final   02/28/2022 139 136 - 145 mmol/L Final      Potassium Potassium   Date Value Ref Range Status   03/02/2022 3.9 3.5 - 5.2 mmol/L Final   02/28/2022 3.9 3.5 - 5.2 mmol/L Final   02/27/2022 3.7 3.5 - 5.2 mmol/L Final     Comment:     Slight hemolysis detected by analyzer. Results may be affected.      Chloride Chloride   Date Value Ref Range Status   03/02/2022 105 98 - 107 mmol/L Final   02/28/2022 112 (H) 98 - 107 mmol/L Final      CO2 CO2   Date Value Ref Range Status   03/02/2022 23.0 22.0 - 29.0 mmol/L Final   02/28/2022 21.0 (L) 22.0 - 29.0 mmol/L Final      BUN BUN   Date Value Ref Range Status   03/02/2022 4 (L) 6 - 20 mg/dL Final   02/28/2022 <2 (L) 6 - 20 mg/dL Final      Creatinine Creatinine   Date Value Ref Range Status   03/02/2022 0.93 0.57 - 1.00 mg/dL Final   02/28/2022 0.68 0.57 - 1.00 mg/dL Final      Calcium Calcium   Date Value Ref Range Status   03/02/2022 11.2 (H) 8.6 - 10.5 mg/dL Final   03/01/2022 10.9 (H) 8.6 - 10.5 mg/dL Final   03/01/2022 11.1 (H) 8.6 - 10.5 mg/dL Final   03/01/2022 11.5 (H) 8.6 - 10.5 mg/dL Final   02/28/2022 11.7 (H) 8.6 - 10.5 mg/dL Final   02/28/2022 12.2 (H) 8.6 - 10.5 mg/dL Final      PO4 No results  found for: CAPO4   Albumin No results found for: ALBUMIN   Magnesium Magnesium   Date Value Ref Range Status   03/02/2022 1.7 1.6 - 2.6 mg/dL Final   03/01/2022 1.8 1.6 - 2.6 mg/dL Final   02/28/2022 1.7 1.6 - 2.6 mg/dL Final      Uric Acid No results found for: URICACID     Medication Review: yes  Assessment/Plan       Hypertension    Obesity    Hyperlipidemia    Iron deficiency anemia    Weight loss, unintentional    Elevated alkaline phosphatase level    Melena    Hypercalcemia    Severe malnutrition (CMS/HCC)    Hyperparathyroidism (HCC)      Assessment & Plan     Hypercalcemia: Total ca 16.8 on admission. Found on routine labs with PCP. Review of old do suggest mild high ca level 10.5-11. No hx of kidney stone. Maria Guadalupe any MVM, Vitamin D or ca supplementation intake. Workup for hypercalcemia on going F/u w  PTHrP, Vitamin D, Vitamin D 1, 25, ACE levels, SPEP, Free light chain ratio, JOSÉ MIGUEL,    Primary hyperpara: Preliminary workup suggestive of high PTH and high joselin. S/p removal of parathyroid adenoma     Hypokalemia: K 2.8 on admission.     Met alkalemia: Bicarb 30 on admission.      Unexpected weight loss: reported 60 pound weight loss in 3-4 months    Renal mass: 2.3cm likely simple cyst. However still need CT w contrast for better characterization. Can be done close to discharge or outpatient.     Recs  Total ca improving relatively on the high side still. She is on ca supplementation for now. Will need labs in 1 week after discharge and f/u in renal clinic in 2 weeks. Patient is instructed to monitor for symptoms of low ca including ( perioral numbness, tingling in hands feet, cramps).    I discussed the patients findings and my recommendations with patient       Sebastián Paredes MD  03/02/22  13:54 EST

## 2022-03-03 ENCOUNTER — TRANSITIONAL CARE MANAGEMENT TELEPHONE ENCOUNTER (OUTPATIENT)
Dept: CALL CENTER | Facility: HOSPITAL | Age: 52
End: 2022-03-03

## 2022-03-03 ENCOUNTER — APPOINTMENT (OUTPATIENT)
Dept: CARDIOLOGY | Facility: HOSPITAL | Age: 52
End: 2022-03-03

## 2022-03-03 ENCOUNTER — HOSPITAL ENCOUNTER (EMERGENCY)
Facility: HOSPITAL | Age: 52
Discharge: HOME | End: 2022-03-03
Attending: EMERGENCY MEDICINE | Admitting: EMERGENCY MEDICINE

## 2022-03-03 VITALS
HEART RATE: 92 BPM | WEIGHT: 250 LBS | DIASTOLIC BLOOD PRESSURE: 89 MMHG | RESPIRATION RATE: 18 BRPM | SYSTOLIC BLOOD PRESSURE: 130 MMHG | OXYGEN SATURATION: 100 % | HEIGHT: 67 IN | BODY MASS INDEX: 39.24 KG/M2 | TEMPERATURE: 98.2 F

## 2022-03-03 DIAGNOSIS — M79.605 BILATERAL LEG PAIN: Primary | ICD-10-CM

## 2022-03-03 DIAGNOSIS — M79.604 BILATERAL LEG PAIN: Primary | ICD-10-CM

## 2022-03-03 LAB
ALBUMIN SERPL-MCNC: 3.9 G/DL (ref 3.5–5.2)
ALBUMIN/GLOB SERPL: 1.1 G/DL
ALP SERPL-CCNC: 223 U/L (ref 39–117)
ALT SERPL W P-5'-P-CCNC: 15 U/L (ref 1–33)
ANION GAP SERPL CALCULATED.3IONS-SCNC: 11 MMOL/L (ref 5–15)
AST SERPL-CCNC: 25 U/L (ref 1–32)
BASOPHILS # BLD AUTO: 0.03 10*3/MM3 (ref 0–0.2)
BASOPHILS NFR BLD AUTO: 0.7 % (ref 0–1.5)
BH CV ECHO MEAS - BSA(HAYCOCK): 2.4 M^2
BH CV ECHO MEAS - BSA: 2.2 M^2
BH CV ECHO MEAS - BZI_BMI: 38 KILOGRAMS/M^2
BH CV ECHO MEAS - BZI_METRIC_HEIGHT: 172.7 CM
BH CV ECHO MEAS - BZI_METRIC_WEIGHT: 113.4 KG
BH CV LOWER VASCULAR LEFT COMMON FEMORAL AUGMENT: NORMAL
BH CV LOWER VASCULAR LEFT COMMON FEMORAL COMPRESS: NORMAL
BH CV LOWER VASCULAR LEFT COMMON FEMORAL PHASIC: NORMAL
BH CV LOWER VASCULAR LEFT COMMON FEMORAL SPONT: NORMAL
BH CV LOWER VASCULAR LEFT DISTAL FEMORAL AUGMENT: NORMAL
BH CV LOWER VASCULAR LEFT DISTAL FEMORAL COMPRESS: NORMAL
BH CV LOWER VASCULAR LEFT DISTAL FEMORAL PHASIC: NORMAL
BH CV LOWER VASCULAR LEFT DISTAL FEMORAL SPONT: NORMAL
BH CV LOWER VASCULAR LEFT GASTRONEMIUS COMPRESS: NORMAL
BH CV LOWER VASCULAR LEFT GREATER SAPH AK COMPRESS: NORMAL
BH CV LOWER VASCULAR LEFT GREATER SAPH BK COMPRESS: NORMAL
BH CV LOWER VASCULAR LEFT LESSER SAPH COMPRESS: NORMAL
BH CV LOWER VASCULAR LEFT MID FEMORAL AUGMENT: NORMAL
BH CV LOWER VASCULAR LEFT MID FEMORAL COMPRESS: NORMAL
BH CV LOWER VASCULAR LEFT MID FEMORAL PHASIC: NORMAL
BH CV LOWER VASCULAR LEFT MID FEMORAL SPONT: NORMAL
BH CV LOWER VASCULAR LEFT PERONEAL COMPRESS: NORMAL
BH CV LOWER VASCULAR LEFT POPLITEAL AUGMENT: NORMAL
BH CV LOWER VASCULAR LEFT POPLITEAL COMPRESS: NORMAL
BH CV LOWER VASCULAR LEFT POPLITEAL PHASIC: NORMAL
BH CV LOWER VASCULAR LEFT POPLITEAL SPONT: NORMAL
BH CV LOWER VASCULAR LEFT POSTERIOR TIBIAL COMPRESS: NORMAL
BH CV LOWER VASCULAR LEFT PROFUNDA FEMORAL AUGMENT: NORMAL
BH CV LOWER VASCULAR LEFT PROFUNDA FEMORAL COMPRESS: NORMAL
BH CV LOWER VASCULAR LEFT PROFUNDA FEMORAL PHASIC: NORMAL
BH CV LOWER VASCULAR LEFT PROFUNDA FEMORAL SPONT: NORMAL
BH CV LOWER VASCULAR LEFT PROXIMAL FEMORAL AUGMENT: NORMAL
BH CV LOWER VASCULAR LEFT PROXIMAL FEMORAL COMPRESS: NORMAL
BH CV LOWER VASCULAR LEFT PROXIMAL FEMORAL PHASIC: NORMAL
BH CV LOWER VASCULAR LEFT PROXIMAL FEMORAL SPONT: NORMAL
BH CV LOWER VASCULAR LEFT SAPHENOFEMORAL JUNCTION AUGMENT: NORMAL
BH CV LOWER VASCULAR LEFT SAPHENOFEMORAL JUNCTION COMPRESS: NORMAL
BH CV LOWER VASCULAR LEFT SAPHENOFEMORAL JUNCTION PHASIC: NORMAL
BH CV LOWER VASCULAR LEFT SAPHENOFEMORAL JUNCTION SPONT: NORMAL
BH CV LOWER VASCULAR LEFT SOLEAL COMPRESS: NORMAL
BH CV LOWER VASCULAR RIGHT COMMON FEMORAL AUGMENT: NORMAL
BH CV LOWER VASCULAR RIGHT COMMON FEMORAL COMPRESS: NORMAL
BH CV LOWER VASCULAR RIGHT COMMON FEMORAL PHASIC: NORMAL
BH CV LOWER VASCULAR RIGHT COMMON FEMORAL SPONT: NORMAL
BH CV LOWER VASCULAR RIGHT DISTAL FEMORAL AUGMENT: NORMAL
BH CV LOWER VASCULAR RIGHT DISTAL FEMORAL COMPRESS: NORMAL
BH CV LOWER VASCULAR RIGHT DISTAL FEMORAL PHASIC: NORMAL
BH CV LOWER VASCULAR RIGHT DISTAL FEMORAL SPONT: NORMAL
BH CV LOWER VASCULAR RIGHT GASTRONEMIUS COMPRESS: NORMAL
BH CV LOWER VASCULAR RIGHT GREATER SAPH AK COMPRESS: NORMAL
BH CV LOWER VASCULAR RIGHT GREATER SAPH BK COMPRESS: NORMAL
BH CV LOWER VASCULAR RIGHT LESSER SAPH COMPRESS: NORMAL
BH CV LOWER VASCULAR RIGHT MID FEMORAL AUGMENT: NORMAL
BH CV LOWER VASCULAR RIGHT MID FEMORAL COMPRESS: NORMAL
BH CV LOWER VASCULAR RIGHT MID FEMORAL PHASIC: NORMAL
BH CV LOWER VASCULAR RIGHT MID FEMORAL SPONT: NORMAL
BH CV LOWER VASCULAR RIGHT PERONEAL COMPRESS: NORMAL
BH CV LOWER VASCULAR RIGHT POPLITEAL AUGMENT: NORMAL
BH CV LOWER VASCULAR RIGHT POPLITEAL COMPRESS: NORMAL
BH CV LOWER VASCULAR RIGHT POPLITEAL PHASIC: NORMAL
BH CV LOWER VASCULAR RIGHT POPLITEAL SPONT: NORMAL
BH CV LOWER VASCULAR RIGHT POSTERIOR TIBIAL COMPRESS: NORMAL
BH CV LOWER VASCULAR RIGHT PROFUNDA FEMORAL AUGMENT: NORMAL
BH CV LOWER VASCULAR RIGHT PROFUNDA FEMORAL COMPRESS: NORMAL
BH CV LOWER VASCULAR RIGHT PROFUNDA FEMORAL PHASIC: NORMAL
BH CV LOWER VASCULAR RIGHT PROFUNDA FEMORAL SPONT: NORMAL
BH CV LOWER VASCULAR RIGHT PROXIMAL FEMORAL AUGMENT: NORMAL
BH CV LOWER VASCULAR RIGHT PROXIMAL FEMORAL COMPRESS: NORMAL
BH CV LOWER VASCULAR RIGHT PROXIMAL FEMORAL PHASIC: NORMAL
BH CV LOWER VASCULAR RIGHT PROXIMAL FEMORAL SPONT: NORMAL
BH CV LOWER VASCULAR RIGHT SAPHENOFEMORAL JUNCTION AUGMENT: NORMAL
BH CV LOWER VASCULAR RIGHT SAPHENOFEMORAL JUNCTION COMPRESS: NORMAL
BH CV LOWER VASCULAR RIGHT SAPHENOFEMORAL JUNCTION PHASIC: NORMAL
BH CV LOWER VASCULAR RIGHT SAPHENOFEMORAL JUNCTION SPONT: NORMAL
BH CV LOWER VASCULAR RIGHT SOLEAL COMPRESS: NORMAL
BILIRUB SERPL-MCNC: 0.5 MG/DL (ref 0–1.2)
BUN SERPL-MCNC: 4 MG/DL (ref 6–20)
BUN/CREAT SERPL: 4.7 (ref 7–25)
CA-I SERPL ISE-MCNC: 1.48 MMOL/L (ref 1.12–1.32)
CALCIUM SPEC-SCNC: 10.4 MG/DL (ref 8.6–10.5)
CHLORIDE SERPL-SCNC: 103 MMOL/L (ref 98–107)
CO2 SERPL-SCNC: 23 MMOL/L (ref 22–29)
CREAT SERPL-MCNC: 0.85 MG/DL (ref 0.57–1)
DEPRECATED RDW RBC AUTO: 38.9 FL (ref 37–54)
EGFRCR SERPLBLD CKD-EPI 2021: 82.6 ML/MIN/1.73
EOSINOPHIL # BLD AUTO: 0.1 10*3/MM3 (ref 0–0.4)
EOSINOPHIL NFR BLD AUTO: 2.2 % (ref 0.3–6.2)
ERYTHROCYTE [DISTWIDTH] IN BLOOD BY AUTOMATED COUNT: 12.7 % (ref 12.3–15.4)
GLOBULIN UR ELPH-MCNC: 3.7 GM/DL
GLUCOSE SERPL-MCNC: 90 MG/DL (ref 65–99)
HCT VFR BLD AUTO: 33.1 % (ref 34–46.6)
HGB BLD-MCNC: 10.9 G/DL (ref 12–15.9)
IMM GRANULOCYTES # BLD AUTO: 0.01 10*3/MM3 (ref 0–0.05)
IMM GRANULOCYTES NFR BLD AUTO: 0.2 % (ref 0–0.5)
LYMPHOCYTES # BLD AUTO: 1.74 10*3/MM3 (ref 0.7–3.1)
LYMPHOCYTES NFR BLD AUTO: 38.1 % (ref 19.6–45.3)
MCH RBC QN AUTO: 27.6 PG (ref 26.6–33)
MCHC RBC AUTO-ENTMCNC: 32.9 G/DL (ref 31.5–35.7)
MCV RBC AUTO: 83.8 FL (ref 79–97)
MONOCYTES # BLD AUTO: 0.54 10*3/MM3 (ref 0.1–0.9)
MONOCYTES NFR BLD AUTO: 11.8 % (ref 5–12)
NEUTROPHILS NFR BLD AUTO: 2.15 10*3/MM3 (ref 1.7–7)
NEUTROPHILS NFR BLD AUTO: 47 % (ref 42.7–76)
NRBC BLD AUTO-RTO: 0 /100 WBC (ref 0–0.2)
PLATELET # BLD AUTO: 376 10*3/MM3 (ref 140–450)
PMV BLD AUTO: 9.1 FL (ref 6–12)
POTASSIUM SERPL-SCNC: 3.6 MMOL/L (ref 3.5–5.2)
PROT SERPL-MCNC: 7.6 G/DL (ref 6–8.5)
RBC # BLD AUTO: 3.95 10*6/MM3 (ref 3.77–5.28)
SODIUM SERPL-SCNC: 137 MMOL/L (ref 136–145)
WBC NRBC COR # BLD: 4.57 10*3/MM3 (ref 3.4–10.8)

## 2022-03-03 PROCEDURE — 36415 COLL VENOUS BLD VENIPUNCTURE: CPT

## 2022-03-03 PROCEDURE — 99283 EMERGENCY DEPT VISIT LOW MDM: CPT

## 2022-03-03 PROCEDURE — 85025 COMPLETE CBC W/AUTO DIFF WBC: CPT | Performed by: PHYSICIAN ASSISTANT

## 2022-03-03 PROCEDURE — 82330 ASSAY OF CALCIUM: CPT | Performed by: PHYSICIAN ASSISTANT

## 2022-03-03 PROCEDURE — 93970 EXTREMITY STUDY: CPT | Performed by: INTERNAL MEDICINE

## 2022-03-03 PROCEDURE — 80053 COMPREHEN METABOLIC PANEL: CPT | Performed by: PHYSICIAN ASSISTANT

## 2022-03-03 PROCEDURE — 93970 EXTREMITY STUDY: CPT

## 2022-03-03 RX ORDER — HYDROCODONE BITARTRATE AND ACETAMINOPHEN 7.5; 325 MG/1; MG/1
1 TABLET ORAL EVERY 6 HOURS PRN
Qty: 12 TABLET | Refills: 0 | Status: SHIPPED | OUTPATIENT
Start: 2022-03-03 | End: 2022-03-09

## 2022-03-03 RX ORDER — HYDROCODONE BITARTRATE AND ACETAMINOPHEN 7.5; 325 MG/1; MG/1
1 TABLET ORAL ONCE
Status: COMPLETED | OUTPATIENT
Start: 2022-03-03 | End: 2022-03-03

## 2022-03-03 RX ADMIN — HYDROCODONE BITARTRATE AND ACETAMINOPHEN 1 TABLET: 7.5; 325 TABLET ORAL at 13:42

## 2022-03-03 NOTE — ED PROVIDER NOTES
EMERGENCY DEPARTMENT ENCOUNTER    Pt Name: Davina Delgado  MRN: [id number]  Pt :   1970  Room Number:  RW6/R6  Date of encounter:  3/3/2022  PCP: Nils Spence MD  ED Provider: Yoni Olivera PA-C    Historian: Patient      HPI:  Chief Complaint: Bilateral leg pain        Context: Davina Delgado is a 51 y.o. female who presents to the ED c/o bilateral leg pain that began yesterday evening.  She scribes the pain as squeezing pain in bilateral legs, states she was unable to stand last night.  States she was up all night with pain, she underwent parathyroidectomy proximally 4 days ago.  She denies any tetany, but does complain of bilateral lower extremity cramping.  No cough chest congestion sputum hemoptysis or prior history of DVT/PE.      PAST MEDICAL HISTORY  Past Medical History:   Diagnosis Date   • Hematuria 2017   • Hyperlipidemia 2016   • Hypertension    • Iron deficiency anemia 2017   • Obesity    • Vitamin D deficiency          PAST SURGICAL HISTORY  Past Surgical History:   Procedure Laterality Date   • PARATHYROIDECTOMY N/A 2022    Procedure: PARATHYROIDECTOMY;  Surgeon: Dung Brar MD;  Location: [facility];  Service: General;  Laterality: N/A;   • TUBAL ABDOMINAL LIGATION     • WISDOM TOOTH EXTRACTION           FAMILY HISTORY  Family History   Problem Relation Age of Onset   • Hypertension Mother    • Colon polyps Father    • Lung cancer Father    • No Known Problems Sister    • No Known Problems Daughter    • No Known Problems Son    • Diabetes Maternal Grandmother    • Alzheimer's disease Paternal Grandmother    • Other Maternal Grandfather         unknown   • Other Paternal Grandfather         unknown   • No Known Problems Son    • Other Son         spinal meningitis   • Other Son         meningitis, PNA   • BRCA 1/2 Neg Hx    • Breast cancer Neg Hx    • Colon cancer Neg Hx    • Endometrial cancer Neg Hx    • Ovarian cancer Neg Hx    • Uterine  cancer Neg Hx          SOCIAL HISTORY  Social History     Socioeconomic History   • Marital status:    Tobacco Use   • Smoking status: Never Smoker   • Smokeless tobacco: Never Used   Vaping Use   • Vaping Use: Never used   Substance and Sexual Activity   • Alcohol use: Not Currently   • Drug use: No   • Sexual activity: Defer         ALLERGIES  Patient has no known allergies.        REVIEW OF SYSTEMS  Review of Systems   Constitutional: Positive for activity change. Negative for appetite change, diaphoresis, fatigue and fever.   HENT: Negative for congestion, rhinorrhea and sore throat.    Eyes: Negative for photophobia and visual disturbance.   Respiratory: Negative for cough, chest tightness and shortness of breath.    Cardiovascular: Negative for chest pain and palpitations.   Gastrointestinal: Negative for abdominal pain, nausea and vomiting.   Genitourinary: Negative for flank pain and hematuria.   Musculoskeletal: Positive for gait problem and myalgias. Negative for arthralgias, back pain, neck pain and neck stiffness.   Neurological: Negative for dizziness, seizures, syncope and headaches.   Psychiatric/Behavioral: Suicidal ideas: .cep.               PHYSICAL EXAM    I have reviewed the triage vital signs and nursing notes.    ED Triage Vitals [03/03/22 0956]   Temp Heart Rate Resp BP SpO2   98.2 °F (36.8 °C) 92 18 130/89 100 %      Temp src Heart Rate Source Patient Position BP Location FiO2 (%)   -- Monitor Sitting Right arm --       Physical Exam  GENERAL:   Appears to be in a significant mild discomfort, nontoxic appearing and afebrile.  HENT: Nares patent.  EYES: No scleral icterus.  CV: Regular rhythm, regular rate.  RESPIRATORY: Normal effort.  No audible wheezes, rales or rhonchi.  ABDOMEN: Soft, nontender  MUSCULOSKELETAL: No deformities.  No obvious edema.  Homans' sign absent.  Pulses are intact distally, sensation is intact distally.  NEURO: Alert, moves all extremities, follows  commands.  SKIN: Warm, dry, no rash visualized.        LAB RESULTS  Recent Results (from the past 24 hour(s))   Duplex Venous Lower Extremity - Bilateral    Collection Time: 03/03/22 11:32 AM   Result Value Ref Range    BSA 2.2 m^2     CV ECHO ABDIAZIZ - BZI_BMI 38.0 kilograms/m^2     CV ECHO ABDIAZIZ - BSA([facility]) 2.4 m^2     CV ECHO ABDIAZIZ - BZI_METRIC_WEIGHT 113.4 kg     CV ECHO ABDIAZIZ - BZI_METRIC_HEIGHT 172.7 cm    Right Common Femoral Spont Y     Right Common Femoral Phasic Y     Right Common Femoral Augment Y     Right Common Femoral Compress C     Right Saphenofemoral Junction Spont Y     Right Saphenofemoral Junction Phasic Y     Right Saphenofemoral Junction Augment Y     Right Saphenofemoral Junction Compress C     Right Profunda Femoral Spont Y     Right Profunda Femoral Phasic Y     Right Profunda Femoral Augment Y     Right Profunda Femoral Compress C     Right Proximal Femoral Spont Y     Right Proximal Femoral Phasic Y     Right Proximal Femoral Augment Y     Right Proximal Femoral Compress C     Right Mid Femoral Spont Y     Right Mid Femoral Phasic Y     Right Mid Femoral Augment Y     Right Mid Femoral Compress C     Right Distal Femoral Spont Y     Right Distal Femoral Phasic Y     Right Distal Femoral Augment Y     Right Distal Femoral Compress C     Right Popliteal Spont Y     Right Popliteal Phasic Y     Right Popliteal Augment Y     Right Popliteal Compress C     Right Posterior Tibial Compress C     Right Peroneal Compress C     Right Gastronemius Compress C     Right Greater Saph AK Compress C     Right Greater Saph BK Compress C     Right Lesser Saph Compress C     Left Common Femoral Spont Y     Left Common Femoral Phasic Y     Left Common Femoral Augment Y     Left Common Femoral Compress C     Left Saphenofemoral Junction Spont Y     Left Saphenofemoral Junction Phasic Y     Left Saphenofemoral Junction Augment Y     Left Saphenofemoral Junction Compress C     Left Profunda Femoral Spont Y      Left Profunda Femoral Phasic Y     Left Profunda Femoral Augment Y     Left Profunda Femoral Compress C     Left Proximal Femoral Spont Y     Left Proximal Femoral Phasic Y     Left Proximal Femoral Augment Y     Left Proximal Femoral Compress C     Left Mid Femoral Spont Y     Left Mid Femoral Phasic Y     Left Mid Femoral Augment Y     Left Mid Femoral Compress C     Left Distal Femoral Spont Y     Left Distal Femoral Phasic Y     Left Distal Femoral Augment Y     Left Distal Femoral Compress C     Left Popliteal Spont Y     Left Popliteal Phasic Y     Left Popliteal Augment Y     Left Popliteal Compress C     Left Posterior Tibial Compress C     Left Peroneal Compress C     Left Gastronemius Compress C     Left Greater Saph AK Compress C     Left Greater Saph BK Compress C     Left Lesser Saph Compress C     BH CV LOWER VASCULAR RIGHT SOLEAL COMPRESS C     Lt soleal compress C    Comprehensive Metabolic Panel    Collection Time: 03/03/22  1:43 PM    Specimen: Blood   Result Value Ref Range    Glucose 90 65 - 99 mg/dL    BUN 4 (L) 6 - 20 mg/dL    Creatinine 0.85 0.57 - 1.00 mg/dL    Sodium 137 136 - 145 mmol/L    Potassium 3.6 3.5 - 5.2 mmol/L    Chloride 103 98 - 107 mmol/L    CO2 23.0 22.0 - 29.0 mmol/L    Calcium 10.4 8.6 - 10.5 mg/dL    Total Protein 7.6 6.0 - 8.5 g/dL    Albumin 3.90 3.50 - 5.20 g/dL    ALT (SGPT) 15 1 - 33 U/L    AST (SGOT) 25 1 - 32 U/L    Alkaline Phosphatase 223 (H) 39 - 117 U/L    Total Bilirubin 0.5 0.0 - 1.2 mg/dL    Globulin 3.7 gm/dL    A/G Ratio 1.1 g/dL    BUN/Creatinine Ratio 4.7 (L) 7.0 - 25.0    Anion Gap 11.0 5.0 - 15.0 mmol/L    eGFR 82.6 >60.0 mL/min/1.73   Calcium, Ionized    Collection Time: 03/03/22  1:43 PM    Specimen: Blood   Result Value Ref Range    Ionized Calcium 1.48 (H) 1.12 - 1.32 mmol/L   CBC Auto Differential    Collection Time: 03/03/22  1:43 PM    Specimen: Blood   Result Value Ref Range    WBC 4.57 3.40 - 10.80 10*3/mm3    RBC 3.95 3.77 - 5.28 10*6/mm3     Hemoglobin 10.9 (L) 12.0 - 15.9 g/dL    Hematocrit 33.1 (L) 34.0 - 46.6 %    MCV 83.8 79.0 - 97.0 fL    MCH 27.6 26.6 - 33.0 pg    MCHC 32.9 31.5 - 35.7 g/dL    RDW 12.7 12.3 - 15.4 %    RDW-SD 38.9 37.0 - 54.0 fl    MPV 9.1 6.0 - 12.0 fL    Platelets 376 140 - 450 10*3/mm3    Neutrophil % 47.0 42.7 - 76.0 %    Lymphocyte % 38.1 19.6 - 45.3 %    Monocyte % 11.8 5.0 - 12.0 %    Eosinophil % 2.2 0.3 - 6.2 %    Basophil % 0.7 0.0 - 1.5 %    Immature Grans % 0.2 0.0 - 0.5 %    Neutrophils, Absolute 2.15 1.70 - 7.00 10*3/mm3    Lymphocytes, Absolute 1.74 0.70 - 3.10 10*3/mm3    Monocytes, Absolute 0.54 0.10 - 0.90 10*3/mm3    Eosinophils, Absolute 0.10 0.00 - 0.40 10*3/mm3    Basophils, Absolute 0.03 0.00 - 0.20 10*3/mm3    Immature Grans, Absolute 0.01 0.00 - 0.05 10*3/mm3    nRBC 0.0 0.0 - 0.2 /100 WBC       If labs were ordered, I independently reviewed the results.        RADIOLOGY  Duplex Venous Lower Extremity - Bilateral    Result Date: 3/3/2022  · Normal bilateral lower extremity venous duplex scan.               PROCEDURES    Procedures    No orders to display       MEDICATIONS GIVEN IN ER    Medications   HYDROcodone-acetaminophen (NORCO) 7.5-325 MG per tablet 1 tablet (1 tablet Oral Given 3/3/22 1342)         PROGRESS, DATA ANALYSIS, CONSULTS, AND MEDICAL DECISION MAKING    All labs have been independently reviewed by me.  All radiology studies have been reviewed by me and the radiologist dictating the report.   EKG's have been independently viewed and interpreted by me.            ED Course as of 03/03/22 2056   Thu Mar 03, 2022   1431 Calcium: 10.4 [TG]   1431 Ionized Calcium(!): 1.48 [TG]      ED Course User Index  [TG] Yoni Olivera PA-C       Negative for DVTs in the lower extremities bilaterally.  Her calcium and ionized calcium are essentially normal.  She was given a single dose of hydrocodone 5 mg here in the emergency department which offered significant relief.  Reviewed with Dr. Schaefer.   Will discharge to home with symptomatic care and follow-up with primary care provider tomorrow.  Patient verbalizes understanding and is agreeable to plan.      AS OF 20:56 EST VITALS:    BP - 130/89  HR - 92  TEMP - 98.2 °F (36.8 °C)  O2 SATS - 100%      MARY reviewed by Lizeth Schaefer MD           DIAGNOSIS  Final diagnoses:   Bilateral leg pain         DISPOSITION  DISCHARGE    Patient discharged in stable condition.    Reviewed implications of results, diagnosis, meds, responsibility to follow up, warning signs and symptoms of possible worsening, potential complications and reasons to return to ER.    Patient/Family voiced understanding of above instructions.    Discussed plan for discharge, as there is no emergent indication for admission.  Pt/family is agreeable and understands need for follow up and possible repeat testing.  Pt/family is aware that discharge does not mean that nothing is wrong but that it indicates no emergency is currently present that requires admission and they must continue care with follow-up as given below or with a physician of their choice.     FOLLOW-UP  Nils Spence MD  [address] [place], [place] [id number] [phone]    Go in 1 day  Recheck tomorrow with Dr. Spence         Medication List      New Prescriptions    HYDROcodone-acetaminophen 7.5-325 MG per tablet  Commonly known as: NORCO  Take 1 tablet by mouth Every 6 (Six) Hours As Needed for Moderate Pain .           Where to Get Your Medications      These medications were sent to [id number] - [place], [place] - [address] AT [facility] OF [address] [facility] - [phone]  - [phone]   [address] [facility] [address]    Phone: [phone]   · HYDROcodone-acetaminophen 7.5-325 MG per tablet                  Yoni Olivera PA-C  03/03/22 1779

## 2022-03-03 NOTE — OUTREACH NOTE
Call Center TCM Note      Responses   [facility] patient discharged from? [place]   Does the patient have one of the following disease processes/diagnoses(primary or secondary)? Other   TCM attempt successful? Yes   Call start time 1707   Call end time 1712   Discharge diagnosis Hypercalcemia,   Left parathyroid adenoma    Meds reviewed with patient/caregiver? Yes   Is the patient having any side effects they believe may be caused by any medication additions or changes? No   Does the patient have all medications ordered at discharge? Yes   Is the patient taking all medications as directed (includes completed medication regime)? Yes   Does the patient have a primary care provider?  Yes   Does the patient have an appointment with their PCP within 7 days of discharge? Yes   Comments regarding PCP HOSP DC FU 3/9/22 @ 2:30 pm.    Has the patient kept scheduled appointments due by today? N/A   Psychosocial issues? No   Did the patient receive a copy of their discharge instructions? Yes   Nursing interventions Reviewed instructions with patient   What is the patient's perception of their health status since discharge? New symptoms unrelated to diagnosis  [Pt with muscle cramps in legs went to ER 3/3/22 ]   Is the patient/caregiver able to teach back signs and symptoms related to disease process for when to call PCP? Yes   Is the patient/caregiver able to teach back signs and symptoms related to disease process for when to call 911? Yes   Is the patient/caregiver able to teach back the hierarchy of who to call/visit for symptoms/problems? PCP, Specialist, Home health nurse, Urgent Care, ED, 911 Yes   TCM call completed? Yes   Wrap up additional comments Pt was seen in ER today for muscle cramps/ spasms. States was ordered meds.           Chapis Rao RN    3/3/2022, 17:13 EST

## 2022-03-03 NOTE — DISCHARGE INSTRUCTIONS
Follow-up with your primary care provider tomorrow.  Return if any change or worsening of symptoms.

## 2022-03-03 NOTE — OUTREACH NOTE
Call Center TCM Note      Responses   [facility] patient discharged from? [place]   Does the patient have one of the following disease processes/diagnoses(primary or secondary)? Other   TCM attempt successful? No   Unsuccessful attempts Attempt 1   Does the patient have a primary care provider?  Yes   Comments regarding PCP HOSP DC FU 3/9/22 @ 2:30 pm.           Chapis Rao RN    3/3/2022, 10:50 EST

## 2022-03-03 NOTE — OUTREACH NOTE
Prep Survey      Responses   [facility] patient discharged from? [place]   Is LACE score < 7 ? No   Emergency Room discharge w/ pulse ox? No   Eligibility [facility]   Date of Admission 02/24/22   Date of Discharge 03/02/22   Discharge Disposition Home or Self Care   Discharge diagnosis Hypercalcemia,   Left parathyroid adenoma    Does the patient have one of the following disease processes/diagnoses(primary or secondary)? Other   Does the patient have Home health ordered? No   Is there a DME ordered? No   Prep survey completed? Yes          Elizabet Looney RN

## 2022-03-05 ENCOUNTER — HOSPITAL ENCOUNTER (OUTPATIENT)
Facility: HOSPITAL | Age: 52
Setting detail: OBSERVATION
Discharge: HOME | End: 2022-03-07
Attending: EMERGENCY MEDICINE | Admitting: INTERNAL MEDICINE

## 2022-03-05 DIAGNOSIS — E83.52 HYPERCALCEMIA: ICD-10-CM

## 2022-03-05 DIAGNOSIS — E83.39 HYPOPHOSPHATEMIA: ICD-10-CM

## 2022-03-05 DIAGNOSIS — E83.81 HUNGRY BONE SYNDROME: Primary | ICD-10-CM

## 2022-03-05 DIAGNOSIS — Z98.890 STATUS POST THYROID SURGERY: ICD-10-CM

## 2022-03-05 LAB
ALBUMIN SERPL-MCNC: 3.2 G/DL (ref 3.5–5.2)
ALBUMIN/GLOB SERPL: 0.9 G/DL
ALP SERPL-CCNC: 195 U/L (ref 39–117)
ALT SERPL W P-5'-P-CCNC: 10 U/L (ref 1–33)
ANION GAP SERPL CALCULATED.3IONS-SCNC: 10 MMOL/L (ref 5–15)
AST SERPL-CCNC: 21 U/L (ref 1–32)
BASOPHILS # BLD AUTO: 0.03 10*3/MM3 (ref 0–0.2)
BASOPHILS NFR BLD AUTO: 0.5 % (ref 0–1.5)
BILIRUB SERPL-MCNC: 0.5 MG/DL (ref 0–1.2)
BUN SERPL-MCNC: 4 MG/DL (ref 6–20)
BUN/CREAT SERPL: 5.1 (ref 7–25)
CA-I SERPL ISE-MCNC: 1.41 MMOL/L (ref 1.12–1.32)
CALCIUM SPEC-SCNC: 9.8 MG/DL (ref 8.6–10.5)
CHLORIDE SERPL-SCNC: 103 MMOL/L (ref 98–107)
CO2 SERPL-SCNC: 22 MMOL/L (ref 22–29)
CREAT SERPL-MCNC: 0.78 MG/DL (ref 0.57–1)
DEPRECATED RDW RBC AUTO: 39.1 FL (ref 37–54)
EGFRCR SERPLBLD CKD-EPI 2021: 91.5 ML/MIN/1.73
EOSINOPHIL # BLD AUTO: 0.19 10*3/MM3 (ref 0–0.4)
EOSINOPHIL NFR BLD AUTO: 3.4 % (ref 0.3–6.2)
ERYTHROCYTE [DISTWIDTH] IN BLOOD BY AUTOMATED COUNT: 12.9 % (ref 12.3–15.4)
GLOBULIN UR ELPH-MCNC: 3.4 GM/DL
GLUCOSE SERPL-MCNC: 105 MG/DL (ref 65–99)
HCT VFR BLD AUTO: 28.8 % (ref 34–46.6)
HGB BLD-MCNC: 9.3 G/DL (ref 12–15.9)
IMM GRANULOCYTES # BLD AUTO: 0.01 10*3/MM3 (ref 0–0.05)
IMM GRANULOCYTES NFR BLD AUTO: 0.2 % (ref 0–0.5)
LYMPHOCYTES # BLD AUTO: 2.31 10*3/MM3 (ref 0.7–3.1)
LYMPHOCYTES NFR BLD AUTO: 40.7 % (ref 19.6–45.3)
MAGNESIUM SERPL-MCNC: 1.4 MG/DL (ref 1.6–2.6)
MCH RBC QN AUTO: 27 PG (ref 26.6–33)
MCHC RBC AUTO-ENTMCNC: 32.3 G/DL (ref 31.5–35.7)
MCV RBC AUTO: 83.7 FL (ref 79–97)
MONOCYTES # BLD AUTO: 0.63 10*3/MM3 (ref 0.1–0.9)
MONOCYTES NFR BLD AUTO: 11.1 % (ref 5–12)
NEUTROPHILS NFR BLD AUTO: 2.5 10*3/MM3 (ref 1.7–7)
NEUTROPHILS NFR BLD AUTO: 44.1 % (ref 42.7–76)
NRBC BLD AUTO-RTO: 0 /100 WBC (ref 0–0.2)
PHOSPHATE SERPL-MCNC: 1.8 MG/DL (ref 2.5–4.5)
PLAT MORPH BLD: NORMAL
PLATELET # BLD AUTO: 374 10*3/MM3 (ref 140–450)
PMV BLD AUTO: 9.5 FL (ref 6–12)
POTASSIUM SERPL-SCNC: 3.6 MMOL/L (ref 3.5–5.2)
PROT SERPL-MCNC: 6.6 G/DL (ref 6–8.5)
PTH-INTACT SERPL-MCNC: 15.1 PG/ML (ref 15–65)
RBC # BLD AUTO: 3.44 10*6/MM3 (ref 3.77–5.28)
RBC MORPH BLD: NORMAL
SODIUM SERPL-SCNC: 135 MMOL/L (ref 136–145)
WBC MORPH BLD: NORMAL
WBC NRBC COR # BLD: 5.67 10*3/MM3 (ref 3.4–10.8)

## 2022-03-05 PROCEDURE — 99284 EMERGENCY DEPT VISIT MOD MDM: CPT

## 2022-03-05 PROCEDURE — 83970 ASSAY OF PARATHORMONE: CPT | Performed by: EMERGENCY MEDICINE

## 2022-03-05 PROCEDURE — 82330 ASSAY OF CALCIUM: CPT | Performed by: EMERGENCY MEDICINE

## 2022-03-05 PROCEDURE — 83735 ASSAY OF MAGNESIUM: CPT | Performed by: EMERGENCY MEDICINE

## 2022-03-05 PROCEDURE — 93005 ELECTROCARDIOGRAM TRACING: CPT | Performed by: EMERGENCY MEDICINE

## 2022-03-05 PROCEDURE — 96365 THER/PROPH/DIAG IV INF INIT: CPT

## 2022-03-05 PROCEDURE — 96368 THER/DIAG CONCURRENT INF: CPT

## 2022-03-05 PROCEDURE — 80050 GENERAL HEALTH PANEL: CPT | Performed by: EMERGENCY MEDICINE

## 2022-03-05 PROCEDURE — 36415 COLL VENOUS BLD VENIPUNCTURE: CPT

## 2022-03-05 PROCEDURE — 84439 ASSAY OF FREE THYROXINE: CPT | Performed by: EMERGENCY MEDICINE

## 2022-03-05 PROCEDURE — 96366 THER/PROPH/DIAG IV INF ADDON: CPT

## 2022-03-05 PROCEDURE — 84100 ASSAY OF PHOSPHORUS: CPT | Performed by: EMERGENCY MEDICINE

## 2022-03-05 PROCEDURE — 85007 BL SMEAR W/DIFF WBC COUNT: CPT | Performed by: EMERGENCY MEDICINE

## 2022-03-05 PROCEDURE — 82550 ASSAY OF CK (CPK): CPT | Performed by: INTERNAL MEDICINE

## 2022-03-06 ENCOUNTER — READMISSION MANAGEMENT (OUTPATIENT)
Dept: CALL CENTER | Facility: HOSPITAL | Age: 52
End: 2022-03-06

## 2022-03-06 PROBLEM — E83.81 HUNGRY BONE SYNDROME: Status: ACTIVE | Noted: 2022-03-06

## 2022-03-06 PROBLEM — D63.8 ANEMIA, CHRONIC DISEASE: Status: ACTIVE | Noted: 2022-03-06

## 2022-03-06 LAB
ALBUMIN SERPL-MCNC: 3.1 G/DL (ref 3.5–5.2)
ALBUMIN/GLOB SERPL: 0.9 G/DL
ALP SERPL-CCNC: 203 U/L (ref 39–117)
ALT SERPL W P-5'-P-CCNC: 10 U/L (ref 1–33)
ANION GAP SERPL CALCULATED.3IONS-SCNC: 10 MMOL/L (ref 5–15)
AST SERPL-CCNC: 15 U/L (ref 1–32)
BILIRUB SERPL-MCNC: 0.5 MG/DL (ref 0–1.2)
BUN SERPL-MCNC: 4 MG/DL (ref 6–20)
BUN/CREAT SERPL: 5.1 (ref 7–25)
CA-I SERPL ISE-MCNC: 1.29 MMOL/L (ref 1.12–1.32)
CALCIUM SPEC-SCNC: 8.7 MG/DL (ref 8.6–10.5)
CHLORIDE SERPL-SCNC: 105 MMOL/L (ref 98–107)
CK SERPL-CCNC: 47 U/L (ref 20–180)
CO2 SERPL-SCNC: 23 MMOL/L (ref 22–29)
CREAT SERPL-MCNC: 0.79 MG/DL (ref 0.57–1)
CRP SERPL-MCNC: 0.51 MG/DL (ref 0–0.5)
EGFRCR SERPLBLD CKD-EPI 2021: 90.1 ML/MIN/1.73
ERYTHROCYTE [SEDIMENTATION RATE] IN BLOOD: 50 MM/HR (ref 0–30)
FERRITIN SERPL-MCNC: 179 NG/ML (ref 13–150)
FLUAV RNA RESP QL NAA+PROBE: NOT DETECTED
FLUBV RNA RESP QL NAA+PROBE: NOT DETECTED
FOLATE SERPL-MCNC: 5.84 NG/ML (ref 4.78–24.2)
GLOBULIN UR ELPH-MCNC: 3.3 GM/DL
GLUCOSE SERPL-MCNC: 97 MG/DL (ref 65–99)
MAGNESIUM SERPL-MCNC: 1.6 MG/DL (ref 1.6–2.6)
PHOSPHATE SERPL-MCNC: 3.2 MG/DL (ref 2.5–4.5)
POTASSIUM SERPL-SCNC: 4.3 MMOL/L (ref 3.5–5.2)
PROT SERPL-MCNC: 6.4 G/DL (ref 6–8.5)
QT INTERVAL: 358 MS
QTC INTERVAL: 426 MS
RSV RNA NPH QL NAA+NON-PROBE: NOT DETECTED
SARS-COV-2 RNA RESP QL NAA+PROBE: NOT DETECTED
SODIUM SERPL-SCNC: 138 MMOL/L (ref 136–145)
T4 FREE SERPL-MCNC: 1.33 NG/DL (ref 0.93–1.7)
TSH SERPL DL<=0.05 MIU/L-ACNC: 0.97 UIU/ML (ref 0.27–4.2)

## 2022-03-06 PROCEDURE — 86140 C-REACTIVE PROTEIN: CPT | Performed by: INTERNAL MEDICINE

## 2022-03-06 PROCEDURE — 83735 ASSAY OF MAGNESIUM: CPT | Performed by: INTERNAL MEDICINE

## 2022-03-06 PROCEDURE — 87637 SARSCOV2&INF A&B&RSV AMP PRB: CPT | Performed by: INTERNAL MEDICINE

## 2022-03-06 PROCEDURE — 82746 ASSAY OF FOLIC ACID SERUM: CPT | Performed by: INTERNAL MEDICINE

## 2022-03-06 PROCEDURE — 0 MAGNESIUM SULFATE 4 GM/100ML SOLUTION: Performed by: INTERNAL MEDICINE

## 2022-03-06 PROCEDURE — 85652 RBC SED RATE AUTOMATED: CPT | Performed by: INTERNAL MEDICINE

## 2022-03-06 PROCEDURE — 96366 THER/PROPH/DIAG IV INF ADDON: CPT

## 2022-03-06 PROCEDURE — G0378 HOSPITAL OBSERVATION PER HR: HCPCS

## 2022-03-06 PROCEDURE — 80053 COMPREHEN METABOLIC PANEL: CPT | Performed by: INTERNAL MEDICINE

## 2022-03-06 PROCEDURE — 84100 ASSAY OF PHOSPHORUS: CPT | Performed by: INTERNAL MEDICINE

## 2022-03-06 PROCEDURE — 25010000002 MAGNESIUM SULFATE 2 GM/50ML SOLUTION: Performed by: INTERNAL MEDICINE

## 2022-03-06 PROCEDURE — 99220 PR INITIAL OBSERVATION CARE/DAY 70 MINUTES: CPT | Performed by: INTERNAL MEDICINE

## 2022-03-06 PROCEDURE — 82330 ASSAY OF CALCIUM: CPT | Performed by: INTERNAL MEDICINE

## 2022-03-06 PROCEDURE — 82728 ASSAY OF FERRITIN: CPT | Performed by: INTERNAL MEDICINE

## 2022-03-06 RX ORDER — POTASSIUM CHLORIDE 7.45 MG/ML
10 INJECTION INTRAVENOUS
Status: DISCONTINUED | OUTPATIENT
Start: 2022-03-06 | End: 2022-03-07 | Stop reason: HOSPADM

## 2022-03-06 RX ORDER — MAGNESIUM SULFATE HEPTAHYDRATE 40 MG/ML
2 INJECTION, SOLUTION INTRAVENOUS AS NEEDED
Status: DISCONTINUED | OUTPATIENT
Start: 2022-03-06 | End: 2022-03-07 | Stop reason: HOSPADM

## 2022-03-06 RX ORDER — GABAPENTIN 100 MG/1
200 CAPSULE ORAL ONCE
Status: COMPLETED | OUTPATIENT
Start: 2022-03-06 | End: 2022-03-06

## 2022-03-06 RX ORDER — DIPHENOXYLATE HYDROCHLORIDE AND ATROPINE SULFATE 2.5; .025 MG/1; MG/1
1 TABLET ORAL DAILY
Status: DISCONTINUED | OUTPATIENT
Start: 2022-03-06 | End: 2022-03-07 | Stop reason: HOSPADM

## 2022-03-06 RX ORDER — ACETAMINOPHEN 325 MG/1
650 TABLET ORAL EVERY 4 HOURS PRN
Status: DISCONTINUED | OUTPATIENT
Start: 2022-03-06 | End: 2022-03-07 | Stop reason: HOSPADM

## 2022-03-06 RX ORDER — FAMOTIDINE 20 MG/1
40 TABLET, FILM COATED ORAL DAILY
Status: DISCONTINUED | OUTPATIENT
Start: 2022-03-06 | End: 2022-03-07 | Stop reason: HOSPADM

## 2022-03-06 RX ORDER — POTASSIUM CHLORIDE 1.5 G/1.77G
40 POWDER, FOR SOLUTION ORAL AS NEEDED
Status: DISCONTINUED | OUTPATIENT
Start: 2022-03-06 | End: 2022-03-07 | Stop reason: HOSPADM

## 2022-03-06 RX ORDER — MAGNESIUM SULFATE HEPTAHYDRATE 40 MG/ML
2 INJECTION, SOLUTION INTRAVENOUS ONCE
Status: COMPLETED | OUTPATIENT
Start: 2022-03-06 | End: 2022-03-06

## 2022-03-06 RX ORDER — ONDANSETRON 2 MG/ML
4 INJECTION INTRAMUSCULAR; INTRAVENOUS EVERY 6 HOURS PRN
Status: DISCONTINUED | OUTPATIENT
Start: 2022-03-06 | End: 2022-03-07 | Stop reason: HOSPADM

## 2022-03-06 RX ORDER — SODIUM CHLORIDE 0.9 % (FLUSH) 0.9 %
10 SYRINGE (ML) INJECTION EVERY 12 HOURS SCHEDULED
Status: DISCONTINUED | OUTPATIENT
Start: 2022-03-06 | End: 2022-03-07 | Stop reason: HOSPADM

## 2022-03-06 RX ORDER — ACETAMINOPHEN 160 MG/5ML
650 SOLUTION ORAL EVERY 4 HOURS PRN
Status: DISCONTINUED | OUTPATIENT
Start: 2022-03-06 | End: 2022-03-07 | Stop reason: HOSPADM

## 2022-03-06 RX ORDER — POTASSIUM CHLORIDE 750 MG/1
40 CAPSULE, EXTENDED RELEASE ORAL AS NEEDED
Status: DISCONTINUED | OUTPATIENT
Start: 2022-03-06 | End: 2022-03-07 | Stop reason: HOSPADM

## 2022-03-06 RX ORDER — MAGNESIUM SULFATE HEPTAHYDRATE 40 MG/ML
4 INJECTION, SOLUTION INTRAVENOUS AS NEEDED
Status: DISCONTINUED | OUTPATIENT
Start: 2022-03-06 | End: 2022-03-07 | Stop reason: HOSPADM

## 2022-03-06 RX ORDER — ONDANSETRON 4 MG/1
4 TABLET, FILM COATED ORAL EVERY 6 HOURS PRN
Status: DISCONTINUED | OUTPATIENT
Start: 2022-03-06 | End: 2022-03-07 | Stop reason: HOSPADM

## 2022-03-06 RX ORDER — ACETAMINOPHEN 650 MG/1
650 SUPPOSITORY RECTAL EVERY 4 HOURS PRN
Status: DISCONTINUED | OUTPATIENT
Start: 2022-03-06 | End: 2022-03-07 | Stop reason: HOSPADM

## 2022-03-06 RX ORDER — SODIUM CHLORIDE 0.9 % (FLUSH) 0.9 %
1-10 SYRINGE (ML) INJECTION AS NEEDED
Status: DISCONTINUED | OUTPATIENT
Start: 2022-03-06 | End: 2022-03-07 | Stop reason: HOSPADM

## 2022-03-06 RX ADMIN — FAMOTIDINE 40 MG: 20 TABLET, FILM COATED ORAL at 09:08

## 2022-03-06 RX ADMIN — GABAPENTIN 200 MG: 100 CAPSULE ORAL at 02:53

## 2022-03-06 RX ADMIN — Medication 10 ML: at 03:34

## 2022-03-06 RX ADMIN — Medication 10 ML: at 09:09

## 2022-03-06 RX ADMIN — POTASSIUM PHOSPHATE, MONOBASIC POTASSIUM PHOSPHATE, DIBASIC 40 MMOL: 224; 236 INJECTION, SOLUTION, CONCENTRATE INTRAVENOUS at 01:24

## 2022-03-06 RX ADMIN — MULTIVITAMIN TABLET 1 TABLET: TABLET at 09:08

## 2022-03-06 RX ADMIN — MAGNESIUM SULFATE HEPTAHYDRATE 4 G: 40 INJECTION, SOLUTION INTRAVENOUS at 14:51

## 2022-03-06 RX ADMIN — MAGNESIUM SULFATE HEPTAHYDRATE 2 G: 2 INJECTION, SOLUTION INTRAVENOUS at 03:34

## 2022-03-06 NOTE — OUTREACH NOTE
Medical Week 2 Survey    Flowsheet Row Responses   [facility] patient discharged from? [place]   Does the patient have one of the following disease processes/diagnoses(primary or secondary)? Other   Week 2 attempt successful? No   Revoke Readmitted          YASH AGUERO - Registered Nurse

## 2022-03-06 NOTE — ED PROVIDER NOTES
[facility]    EMERGENCY DEPARTMENT ENCOUNTER      Pt Name: Davina Delgado  MRN: [id number]  YOB: 1970  Date of evaluation: 3/5/2022  Provider: Marvin Walter MD    CHIEF COMPLAINT       Chief Complaint   Patient presents with   • Extremity Issue         HISTORY OF PRESENT ILLNESS  (Location/Symptom, Timing/Onset, Context/Setting, Quality, Duration, Modifying Factors, Severity.)   Davina Delgado is a 51 y.o. female who presents to the emergency department w/ 2-3 days of progressively worsening distal extremity paresthesias w/o any weakness or other associated symptoms. She was recently admitted here w/ hypercalcemia, found to have hyperparathyroidism, and had removal of parathyroid adenoma w/ Dr. Brar.       Nursing notes were reviewed.    REVIEW OF SYSTEMS    (2-9 systems for level 4, 10 or more for level 5)   ROS:  General:  No fevers, no chills, no weakness  Cardiovascular:  No chest pain, no palpitations  Respiratory:  No shortness of breath, no cough, no wheezing  Gastrointestinal:  No pain, no nausea, no vomiting, no diarrhea  Musculoskeletal:  No muscle pain, no joint pain  Skin:  No rash  Neurologic:  Paresthesias  Psychiatric:  No anxiety  Genitourinary:  No dysuria, no hematuria    Except as noted above the remainder of the review of systems was reviewed and negative.       PAST MEDICAL HISTORY     Past Medical History:   Diagnosis Date   • Hematuria 11/22/2017   • Hyperlipidemia 9/19/2016   • Hypertension    • Iron deficiency anemia 11/22/2017   • Obesity    • Vitamin D deficiency          SURGICAL HISTORY       Past Surgical History:   Procedure Laterality Date   • PARATHYROIDECTOMY N/A 2/28/2022    Procedure: PARATHYROIDECTOMY;  Surgeon: Dung Brar MD;  Location: [facility];  Service: General;  Laterality: N/A;   • TUBAL ABDOMINAL LIGATION     • WISDOM TOOTH EXTRACTION           CURRENT MEDICATIONS     No current facility-administered medications for this  encounter.    Current Outpatient Medications:   •  calcium carbonate (TUMS) 500 MG chewable tablet, Chew 2 tablets 3 (Three) Times a Day for 30 days., Disp: 180 tablet, Rfl: 0  •  HYDROcodone-acetaminophen (NORCO) 7.5-325 MG per tablet, Take 1 tablet by mouth Every 6 (Six) Hours As Needed for Moderate Pain ., Disp: 12 tablet, Rfl: 0  •  multivitamin (Multiple Vitamin Essential) tablet tablet, Take 1 tablet by mouth Daily. OTC, Disp: , Rfl:   •  ondansetron (Zofran) 4 MG tablet, Take 1 tablet by mouth Every 8 (Eight) Hours As Needed for Nausea or Vomiting., Disp: 30 tablet, Rfl: 2  •  Vitamin D, Ergocalciferol, 2000 units capsule, Take 2,000 Units by mouth Daily. OTC, Disp: , Rfl:     ALLERGIES     Lisinopril    FAMILY HISTORY       Family History   Problem Relation Age of Onset   • Hypertension Mother    • Colon polyps Father    • Lung cancer Father    • No Known Problems Sister    • No Known Problems Daughter    • No Known Problems Son    • Diabetes Maternal Grandmother    • Alzheimer's disease Paternal Grandmother    • Other Maternal Grandfather         unknown   • Other Paternal Grandfather         unknown   • No Known Problems Son    • Other Son         spinal meningitis   • Other Son         meningitis, PNA   • BRCA 1/2 Neg Hx    • Breast cancer Neg Hx    • Colon cancer Neg Hx    • Endometrial cancer Neg Hx    • Ovarian cancer Neg Hx    • Uterine cancer Neg Hx           SOCIAL HISTORY       Social History     Socioeconomic History   • Marital status:    Tobacco Use   • Smoking status: Never Smoker   • Smokeless tobacco: Never Used   Vaping Use   • Vaping Use: Never used   Substance and Sexual Activity   • Alcohol use: Not Currently   • Drug use: No   • Sexual activity: Defer         PHYSICAL EXAM    (up to 7 for level 4, 8 or more for level 5)     Vitals:    03/06/22 1904 03/07/22 0355 03/07/22 0600 03/07/22 0754   BP:  118/80  120/84   BP Location:  Right arm  Right arm   Patient Position:  Lying  Lying    Pulse:  88  91   Resp: 16 16  16   Temp: 99.1 °F (37.3 °C) 98.6 °F (37 °C)  98.3 °F (36.8 °C)   TempSrc: Oral Oral  Oral   SpO2:       Weight:   122 kg (268 lb 14.4 oz)    Height:           Physical Exam  General: Awake, alert, no acute distress.  HEENT: Conjunctivae normal.  Neck: Trachea midline.  Cardiac: Heart regular rate, rhythm, no murmurs, rubs, or gallops  Lungs: Lungs are clear to auscultation, there is no wheezing, rhonchi, or rales. There is no use of accessory muscles.  Chest wall: There is no tenderness to palpation over the chest wall or over ribs  Abdomen: Abdomen is soft, nontender, nondistended. There are no firm or pulsatile masses, no rebound rigidity or guarding.   Musculoskeletal: No deformity.  Neuro: Alert and oriented x 4.  Dermatology: Skin is warm and dry  Psych: Mentation is grossly normal, cognition is grossly normal. Affect is appropriate.      DIAGNOSTIC RESULTS     EKG: All EKGs are interpreted by the Emergency Department Physician who either signs or Co-signs this chart in the absence of a cardiologist.    ECG 12 Lead   Final Result   Test Reason : electroyte abnormality   Blood Pressure :   */*   mmHG   Vent. Rate :  85 BPM     Atrial Rate :  85 BPM      P-R Int : 154 ms          QRS Dur :  88 ms       QT Int : 358 ms       P-R-T Axes :  23 -28  72 degrees      QTc Int : 426 ms      Normal sinus rhythm   Minimal voltage criteria for LVH, may be normal variant   Septal infarct (cited on or before 24-FEB-2022)   Abnormal ECG   When compared with ECG of 24-FEB-2022 09:49,   QRS duration has decreased   Confirmed by ARGENTINA SHETTY ([id number]) on 3/6/2022 6:44:07 AM      Referred By: EDMD           Confirmed By: ARGENTINA SHETTY      SCANNED - TELEMETRY     Final Result             ED BEDSIDE ULTRASOUND:   Performed by ED Physician - none    LABS:    I have reviewed and interpreted all of the currently available lab results from this visit (if applicable):  Results for orders placed or  performed during the hospital encounter of 03/05/22   COVID-19, FLU A/B, RSV PCR - Swab, Nasopharynx    Specimen: Nasopharynx; Swab   Result Value Ref Range    COVID19 Not Detected Not Detected - Ref. Range    Influenza A PCR Not Detected Not Detected    Influenza B PCR Not Detected Not Detected    RSV, PCR Not Detected Not Detected   Comprehensive Metabolic Panel    Specimen: Blood   Result Value Ref Range    Glucose 105 (H) 65 - 99 mg/dL    BUN 4 (L) 6 - 20 mg/dL    Creatinine 0.78 0.57 - 1.00 mg/dL    Sodium 135 (L) 136 - 145 mmol/L    Potassium 3.6 3.5 - 5.2 mmol/L    Chloride 103 98 - 107 mmol/L    CO2 22.0 22.0 - 29.0 mmol/L    Calcium 9.8 8.6 - 10.5 mg/dL    Total Protein 6.6 6.0 - 8.5 g/dL    Albumin 3.20 (L) 3.50 - 5.20 g/dL    ALT (SGPT) 10 1 - 33 U/L    AST (SGOT) 21 1 - 32 U/L    Alkaline Phosphatase 195 (H) 39 - 117 U/L    Total Bilirubin 0.5 0.0 - 1.2 mg/dL    Globulin 3.4 gm/dL    A/G Ratio 0.9 g/dL    BUN/Creatinine Ratio 5.1 (L) 7.0 - 25.0    Anion Gap 10.0 5.0 - 15.0 mmol/L    eGFR 91.5 >60.0 mL/min/1.73   TSH    Specimen: Blood   Result Value Ref Range    TSH 0.968 0.270 - 4.200 uIU/mL   T4, Free    Specimen: Blood   Result Value Ref Range    Free T4 1.33 0.93 - 1.70 ng/dL   Phosphorus    Specimen: Blood   Result Value Ref Range    Phosphorus 1.8 (C) 2.5 - 4.5 mg/dL   Magnesium    Specimen: Blood   Result Value Ref Range    Magnesium 1.4 (L) 1.6 - 2.6 mg/dL   Calcium, Ionized    Specimen: Blood   Result Value Ref Range    Ionized Calcium 1.41 (H) 1.12 - 1.32 mmol/L   CBC Auto Differential    Specimen: Blood   Result Value Ref Range    WBC 5.67 3.40 - 10.80 10*3/mm3    RBC 3.44 (L) 3.77 - 5.28 10*6/mm3    Hemoglobin 9.3 (L) 12.0 - 15.9 g/dL    Hematocrit 28.8 (L) 34.0 - 46.6 %    MCV 83.7 79.0 - 97.0 fL    MCH 27.0 26.6 - 33.0 pg    MCHC 32.3 31.5 - 35.7 g/dL    RDW 12.9 12.3 - 15.4 %    RDW-SD 39.1 37.0 - 54.0 fl    MPV 9.5 6.0 - 12.0 fL    Platelets 374 140 - 450 10*3/mm3    Neutrophil % 44.1  42.7 - 76.0 %    Lymphocyte % 40.7 19.6 - 45.3 %    Monocyte % 11.1 5.0 - 12.0 %    Eosinophil % 3.4 0.3 - 6.2 %    Basophil % 0.5 0.0 - 1.5 %    Immature Grans % 0.2 0.0 - 0.5 %    Neutrophils, Absolute 2.50 1.70 - 7.00 10*3/mm3    Lymphocytes, Absolute 2.31 0.70 - 3.10 10*3/mm3    Monocytes, Absolute 0.63 0.10 - 0.90 10*3/mm3    Eosinophils, Absolute 0.19 0.00 - 0.40 10*3/mm3    Basophils, Absolute 0.03 0.00 - 0.20 10*3/mm3    Immature Grans, Absolute 0.01 0.00 - 0.05 10*3/mm3    nRBC 0.0 0.0 - 0.2 /100 WBC   PTH, Intact    Specimen: Blood   Result Value Ref Range    PTH, Intact 15.1 15.0 - 65.0 pg/mL   Scan Slide    Specimen: Blood   Result Value Ref Range    RBC Morphology Normal Normal    WBC Morphology Normal Normal    Platelet Morphology Normal Normal   CK    Specimen: Blood   Result Value Ref Range    Creatine Kinase 47 20 - 180 U/L   Sedimentation Rate    Specimen: Blood   Result Value Ref Range    Sed Rate 50 (H) 0 - 30 mm/hr   C-reactive Protein    Specimen: Blood   Result Value Ref Range    C-Reactive Protein 0.51 (H) 0.00 - 0.50 mg/dL   Folate    Specimen: Blood   Result Value Ref Range    Folate 5.84 4.78 - 24.20 ng/mL   Ferritin    Specimen: Blood   Result Value Ref Range    Ferritin 179.00 (H) 13.00 - 150.00 ng/mL   Comprehensive Metabolic Panel    Specimen: Blood   Result Value Ref Range    Glucose 97 65 - 99 mg/dL    BUN 4 (L) 6 - 20 mg/dL    Creatinine 0.79 0.57 - 1.00 mg/dL    Sodium 138 136 - 145 mmol/L    Potassium 4.3 3.5 - 5.2 mmol/L    Chloride 105 98 - 107 mmol/L    CO2 23.0 22.0 - 29.0 mmol/L    Calcium 8.7 8.6 - 10.5 mg/dL    Total Protein 6.4 6.0 - 8.5 g/dL    Albumin 3.10 (L) 3.50 - 5.20 g/dL    ALT (SGPT) 10 1 - 33 U/L    AST (SGOT) 15 1 - 32 U/L    Alkaline Phosphatase 203 (H) 39 - 117 U/L    Total Bilirubin 0.5 0.0 - 1.2 mg/dL    Globulin 3.3 gm/dL    A/G Ratio 0.9 g/dL    BUN/Creatinine Ratio 5.1 (L) 7.0 - 25.0    Anion Gap 10.0 5.0 - 15.0 mmol/L    eGFR 90.1 >60.0 mL/min/1.73    Calcium, Ionized    Specimen: Blood   Result Value Ref Range    Ionized Calcium 1.29 1.12 - 1.32 mmol/L   Magnesium    Specimen: Blood   Result Value Ref Range    Magnesium 1.6 1.6 - 2.6 mg/dL   Phosphorus    Specimen: Blood   Result Value Ref Range    Phosphorus 3.2 2.5 - 4.5 mg/dL   Vitamin B12    Specimen: Blood   Result Value Ref Range    Vitamin B-12 404 211 - 946 pg/mL   Basic Metabolic Panel    Specimen: Blood   Result Value Ref Range    Glucose 101 (H) 65 - 99 mg/dL    BUN 4 (L) 6 - 20 mg/dL    Creatinine 0.79 0.57 - 1.00 mg/dL    Sodium 139 136 - 145 mmol/L    Potassium 4.1 3.5 - 5.2 mmol/L    Chloride 106 98 - 107 mmol/L    CO2 22.0 22.0 - 29.0 mmol/L    Calcium 8.2 (L) 8.6 - 10.5 mg/dL    BUN/Creatinine Ratio 5.1 (L) 7.0 - 25.0    Anion Gap 11.0 5.0 - 15.0 mmol/L    eGFR 90.1 >60.0 mL/min/1.73   Magnesium    Specimen: Blood   Result Value Ref Range    Magnesium 2.0 1.6 - 2.6 mg/dL   Phosphorus    Specimen: Blood   Result Value Ref Range    Phosphorus 3.2 2.5 - 4.5 mg/dL   Calcium, Ionized    Specimen: Blood   Result Value Ref Range    Ionized Calcium 1.23 1.12 - 1.32 mmol/L   Vitamin D 25 Hydroxy    Specimen: Blood   Result Value Ref Range    25 Hydroxy, Vitamin D 14.9 (L) 30.0 - 100.0 ng/ml   ECG 12 Lead   Result Value Ref Range    QT Interval 358 ms    QTC Interval 426 ms        All other labs were within normal range or not returned as of this dictation.      EMERGENCY DEPARTMENT COURSE and DIFFERENTIAL DIAGNOSIS/MDM:   Vitals:    Vitals:    03/06/22 1904 03/07/22 0355 03/07/22 0600 03/07/22 0754   BP:  118/80  120/84   BP Location:  Right arm  Right arm   Patient Position:  Lying  Lying   Pulse:  88  91   Resp: 16 16  16   Temp: 99.1 °F (37.3 °C) 98.6 °F (37 °C)  98.3 °F (36.8 °C)   TempSrc: Oral Oral  Oral   SpO2:       Weight:   122 kg (268 lb 14.4 oz)    Height:           ED Course as of 03/08/22 0559   Sun Mar 06, 2022   0000 Phosphorus(!!): 1.8 [NS]   0004 Ionized Calcium(!): 1.41 [NS]   0011  Spoke w/ Dr. Holm on call for nephrology. Discussed patient recent history, surgery, presentation, symptoms, lab values. Recommends 30-40 mmol IV NaP or KP with repeat PRN until sx resolve. [NS]   0028 Spoke w/ Dr. Kyle who accepts the pt for admission. [NS]      ED Course User Index  [NS] Marvin Walter MD       Patient w/ paresthesia related to hypophosphatemia. This is likely 2/2 hungry bone syndrome related to recent adenoma removal. No evidence of cardiac problems at the time of ED evaluation. Patient also w/ some elevation in ionized Ca. No other deficits on exam. Nephrology consulted in the ED and patient admitted for replacement.         MEDICATIONS ADMINISTERED IN ED:  Medications   potassium phosphate 40 mmol in sodium chloride 0.9 % 500 mL infusion (0 mmol Intravenous Stopped 3/6/22 1000)   magnesium sulfate 2g/50 mL (PREMIX) infusion (0 g Intravenous Stopped 3/6/22 0430)   gabapentin (NEURONTIN) capsule 200 mg (200 mg Oral Given 3/6/22 0253)         CRITICAL CARE TIME    Approximately 35 minutes of discontinuous critical care time was provided to this patient by myself absent of any time spent performing procedures.  Patient presents critically ill with significant hypophosphatemia placing the neuro and CV systems at risk requiring the following interventions: IV phosphorus replacement, interprateion of of labs/ecg, specialist consultation with nephrology, frequent reassessment, coordination of admission. Patient at high risk of deterioration and possibly death without these interventions.        FINAL IMPRESSION      1. Hungry bone syndrome    2. Hypophosphatemia    3. Hypercalcemia    4. Status post thyroid surgery          DISPOSITION/PLAN     ED Disposition     ED Disposition   Decision to Admit    Condition   --    Comment   Level of Care: Telemetry [5]   Diagnosis: Hungry bone syndrome [275.5.ICD-9-CM]   Admitting Physician: MARYCARMEN KYLE [[id number]]   Attending Physician: MARYCARMEN KYLE  [[id number]]                   Marvin Walter MD  Attending Emergency Physician               Marvin Walter MD  03/08/22 0537

## 2022-03-06 NOTE — PLAN OF CARE
Goal Outcome Evaluation:  Plan of Care Reviewed With: patient        Progress: no change  Outcome Evaluation: VSS; NSR on monitor; A&O LS clear on room air denies pain or N/V only complaint tingling in fingers and toes; resting in bed at this time; call light within her reach; instructed pt she is on bedrest and up to BSC only; verbalized understanding; will continue to monitor

## 2022-03-06 NOTE — ED NOTES
Attempt to 5G  RN not available at this time  RN to call this RN back at [phone]     Nasrin Mcgregor, RN  03/06/22 6508

## 2022-03-06 NOTE — CASE MANAGEMENT/SOCIAL WORK
Discharge Planning Assessment  [facility]     Patient Name: Davina Delgado  MRN: [id number]  Today's Date: 3/6/2022    Admit Date: 3/5/2022     Discharge Needs Assessment     Row Name 03/06/22 0905       Living Environment    People in Home spouse    Current Living Arrangements apartment    Primary Care Provided by self    Provides Primary Care For no one    Family Caregiver if Needed spouse    Able to Return to Prior Arrangements yes       Resource/Environmental Concerns    Resource/Environmental Concerns none       Transition Planning    Patient/Family Anticipates Transition to home with family    Patient/Family Anticipated Services at Transition     Transportation Anticipated family or friend will provide       Discharge Needs Assessment    Equipment Currently Used at Home none    Concerns to be Addressed denies needs/concerns at this time    Discharge Coordination/Progress Spoke with patient at bedside.  Patient resides in an apartment in [facility] with her .  Patient denied any DME or history of HH.  Patient states that she does have a POA, however one is not on file.  Patient's PCP is Nils Spence, and she gets her prescriptions filled at [facility] off of [address] Patient denied issues with affording her medications, and family will be able to transport her home at discharge.               Discharge Plan     Row Name 03/06/22 0907       Plan    Plan Home    Patient/Family in Agreement with Plan yes    Plan Comments Spoke with patient at bedside.  Patient resides in an apartment in [facility] with her .  Patient denied any DME or history of HH.  Patient states that she does have a POA, however one is not on file.  Patient's PCP is Nils Spence, and [facility] [address] Patient denied issues with affording her medications, and family will be able to transport her home at discharge.    Received CM consult for placement.  When  CM went to speak with patient, patient was in the restroom.  Patient was able to ambulate out of the restroom safely and using/directing her IV pole.  Patient was also able to plug in IV pump, and then sit down on the bed.  Unsure if patient really needs placement at this time.  PT and OT are not ordered for patient.    Final Discharge Disposition Code 01 - home or self-care              Continued Care and Services - Admitted Since 3/5/2022    Coordination has not been started for this encounter.          Demographic Summary    No documentation.                Functional Status     Row Name 03/06/22 0903       Functional Status    Usual Activity Tolerance good    Current Activity Tolerance good       Functional Status, IADL    Medications independent    Meal Preparation independent    Housekeeping independent    Laundry independent    Shopping independent       Mental Status    General Appearance WDL WDL               Psychosocial    No documentation.                Abuse/Neglect    No documentation.                Legal    No documentation.                Substance Abuse    No documentation.                Patient Forms    No documentation.                   Mara Delarosa RN

## 2022-03-06 NOTE — CASE MANAGEMENT/SOCIAL WORK
Discharge Planning Assessment  [facility]     Patient Name: Davina Delgado  MRN: [id number]  Today's Date: 3/6/2022    Admit Date: 3/5/2022     Discharge Needs Assessment     Row Name 03/06/22 0905       Living Environment    People in Home spouse    Current Living Arrangements apartment    Primary Care Provided by self    Provides Primary Care For no one    Family Caregiver if Needed spouse    Able to Return to Prior Arrangements yes       Resource/Environmental Concerns    Resource/Environmental Concerns none       Transition Planning    Patient/Family Anticipates Transition to home with family    Patient/Family Anticipated Services at Transition     Transportation Anticipated family or friend will provide       Discharge Needs Assessment    Equipment Currently Used at Home none    Concerns to be Addressed denies needs/concerns at this time    Discharge Coordination/Progress Spoke with patient at bedside.  Patient resides in an apartment in [facility] with her .  Patient denied any DME or history of HH.  Patient states that she does have a POA, however one is not on file.  Patient's PCP is Nils Spence, and she gets her prescriptions filled at [facility] off of [address] Patient denied issues with affording her medications, and family will be able to transport her home at discharge.               Discharge Plan     Row Name 03/06/22 0907       Plan    Plan Home    Patient/Family in Agreement with Plan yes    Plan Comments Spoke with patient at bedside.  Patient resides in an apartment in [facility] with her .  Patient denied any DME or history of HH.  Patient states that she does have a POA, however one is not on file.  Patient's PCP is Nils Spence, and she gets her prescriptions filled at [facility] off of [address] Patient denied issues with affording her medications, and family will be able to transport her home at discharge.    Final Discharge Disposition Code 01 - home  or self-care              Continued Care and Services - Admitted Since 3/5/2022    Coordination has not been started for this encounter.          Demographic Summary    No documentation.                Functional Status     Row Name 03/06/22 0903       Functional Status    Usual Activity Tolerance good    Current Activity Tolerance good       Functional Status, IADL    Medications independent    Meal Preparation independent    Housekeeping independent    Laundry independent    Shopping independent       Mental Status    General Appearance WDL WDL               Psychosocial    No documentation.                Abuse/Neglect    No documentation.                Legal    No documentation.                Substance Abuse    No documentation.                Patient Forms    No documentation.                   Mara Delarosa RN

## 2022-03-06 NOTE — PROGRESS NOTES
[facility] Medicine Services  ADMISSION FOLLOW-UP NOTE          Patient admitted after midnight, H&P by my partner performed earlier on today's date reviewed.  Interim findings, labs, and charting also reviewed.        [facility] Problem List has been managed and updated to include any new diagnoses:  Active Hospital Problems    Diagnosis  POA   • **Hungry bone syndrome [E83.81]  Yes   • Anemia, chronic disease [D63.8]  Unknown   • HTN (hypertension) [I10]  Yes      Resolved Hospital Problems   No resolved problems to display.         ADDITIONAL PLAN:  - detailed assessment and plan from admission reviewed  - Ms. Delgado is a 52yo female s/p parathyroidectomy about 1 week ago with c/o of tingling in both hands and feet and LE weakness    Tingling hands/feet  LE weakness  Recent parathyroidectomy by Dr. Brar  Hypophosphatemia.    --felt to be d/t hypophosphatemia, replacing.  Some improvement today but still symptoms so will also check b12 and vitamin D  --did have hypercalcemia but wnl today  --will let Dr. Brar know that patient here  --PT/OT  --cont vitamin D 2000IU daily, calcium carbonate 500mg BID/may need to decrease a little.     Anemia  --c/w anemia of chronic disease.  B12 and folate pending    HTN  --holding lisinopril d/t ?of possible allergic reaction in past    DVT proph  --lovenox    Disposition: if symptoms improving/phos normal tomorrow then plan d/c home in AM    Conor Horner MD  03/06/22

## 2022-03-06 NOTE — H&P
[facility] Medicine Services  HISTORY AND PHYSICAL    Patient Name: Davina Delgado  : 1970  MRN: [id number]  Primary Care Physician: Nils Spence MD  Date of admission: 3/5/2022      Subjective   Subjective     Chief Complaint:  Tingling hands and feet.    HPI:  Davina Delgado is a 51 y.o. female  to ED second visit to ED within past week, first visit was on 3/3/2022-secondary to lower extremity weakness.  Presented today with chief complaint of tingling/burning sensation in both hands and feet, could not sleep, patient did take lisinopril just before the symptoms started, but denies any tingling in the tongue.  Still feels her legs are weak whenever she tries to walk, no focal deficit, denies any complaint of hand spasm, or spasm in her jaw.  Do not complain of any headache, sore throat, fever, chills, abdominal pain, nausea or vomiting.  Denies any complaint of bleeding, patient had a history of menorrhagia a year ago.  Denies any complaint of exertional shortness of breath.  Patient has been scheduled for colonoscopy next week.  No complaint of neck pain, surgical scar has been healing well       Patient admitted from -3/2/2022-secondary to hypercalcemia-had parathyroidectomy secondary to adenoma.  In ED patient get potassium phosphate 40 mEq.  Current COVID Risks are:  [] Fever []  Cough [] Shortness of breath [] Fatigue [] Change in taste or smell    [] Exposure to COVID positive patient  [] High risk facility   []  NONE  COVID VACCINE status    The patient qualifies to receive the vaccine, but they have not yet received it.      Review of Systems   Complete ROS done, which is negative except as above and HPI.  Old records reviewed and summarized in PM hx      Personal History     Past Medical History:   Diagnosis Date   • Hematuria 2017   • Hyperlipidemia 2016   • Hypertension    • Iron deficiency anemia 2017   • Obesity    • Vitamin D  deficiency      Parathyroid adenoma-with resection-on vitamin D 200 units, calcium carbonate 500 mg every 12  Hypertension-lisinopril 20 mg daily  Chronic pain-Norco 7.5 mg daily  Iron deficiency anemia  Obese      Past Surgical History:   Procedure Laterality Date   • PARATHYROIDECTOMY N/A 2/28/2022    Procedure: PARATHYROIDECTOMY;  Surgeon: Dung Brar MD;  [facility]: [facility];  Service: General;  Laterality: N/A;   • TUBAL ABDOMINAL LIGATION     • WISDOM TOOTH EXTRACTION         Family History: family history includes Alzheimer's disease in her paternal grandmother; Colon polyps in her father; Diabetes in her maternal grandmother; Hypertension in her mother; Lung cancer in her father; No Known Problems in her daughter, sister, son, and son; Other in her maternal grandfather, paternal grandfather, son, and son. Otherwise pertinent FHx was reviewed and unremarkable.     Social History:  reports that she has never smoked. She has never used smokeless tobacco. She reports previous alcohol use. She reports that she does not use drugs.      Medications:  Available home medication information reviewed.  (Not in a hospital admission)      Allergies   Allergen Reactions   • Lisinopril Other (See Comments)     Tingling in hands and feet       Objective   Objective     Vital Signs:   Temp:  [97.6 °F (36.4 °C)] 97.6 °F (36.4 °C)  Heart Rate:  [] 91  Resp:  [16-18] 18-99% on room air  BP: (132-144)/(89-90) 132/90        Physical Exam     GENERAL- Not distressed, well nourished.  RS- CTA-BL, ,  No wheezing , no crackles, good effort.  CVS- s1s2 Regular, no murmur.  ABD- soft, nontender, not distended, no organomegaly.  EXT- no edema.  NEURO- AAO-3, power 5/5 in all ext, no gross sensory deficit, cranial nerves intact.  EYES- Conjunctivae are normal. Pupils are equal, round, and reactive to light. No scleral icterus.   ENT- no external ear nose lesions, mucosa moist.  NECK- No JVD present. No tracheal deviation  present. No thyromegaly present,No cervical lymphadenopathy.  JOINTS/MSK- no deformity, no swelling.  SKIN- no rash , warm to touch.  PSYCHIATRIC- Normal mood and affect. Behavior is normal. Thought content normal.     Results Reviewed:  I have personally reviewed current lab and radiology data.    Results from last 7 days   Lab Units 03/05/22  2332   WBC 10*3/mm3 5.67   HEMOGLOBIN g/dL 9.3*   HEMATOCRIT % 28.8*   PLATELETS 10*3/mm3 374     Results from last 7 days   Lab Units 03/05/22  2331   SODIUM mmol/L 135*   POTASSIUM mmol/L 3.6   CHLORIDE mmol/L 103   CO2 mmol/L 22.0   BUN mg/dL 4*   CREATININE mg/dL 0.78   GLUCOSE mg/dL 105*   CALCIUM mg/dL 9.8   ALT (SGPT) U/L 10   AST (SGOT) U/L 21     Estimated Creatinine Clearance: 109.5 mL/min (by C-G formula based on SCr of 0.78 mg/dL).  Brief Urine Lab Results  (Last result in the past 365 days)      Color   Clarity   Blood   Leuk Est   Nitrite   Protein   CREAT   Urine HCG        02/28/22 1442               Negative           Imaging Results (Last 24 Hours)     ** No results found for the last 24 hours. **        Glucose-105, INS calcium 1.4, phosphorus 1.8, PTH 15, magnesium 1.4  WBC 5, hemoglobin 9, MCV of 83, platelet 374,  EKG-sinus rhythm 85 bpm, ,  Anemia work-up-in February-with iron 49, total iron binding capacity 234.      Assessment/Plan   Active Hospital Problems    Diagnosis  POA   • **Hungry bone syndrome [E83.81]  Yes   • Anemia, chronic disease [D63.8]  Unknown   • HTN (hypertension) [I10]  Yes       Assessment & Plan   Tingling sensation in hands and feet-DD-secondary to hypophosphatemia which can cause paresthesias.-Dr. Dunham consulted from ED-lab abnormalities likely secondary to hungry bone syndrome.  -Patient has recently parathyroid adenoma resection death.  -Has been taking calcium supplements calcium levels normal, but phosphorus on lab work-up was found to be low at 1.8.  -We will need phosphate replacement per protocol.  -Monitor for  symptom improvement.    Parathyroid adenoma-with resection-on vitamin D 2000 units, calcium carbonate 500 mg every 12-recheck labs in a.m., continue supplement depending on the levels.    Hypertension-lisinopril 20 mg daily-we will hold it, given possibility of allergic reaction in the past-continue to monitor.    Anemia-denies any acute bleeding, iron indicis consistent with chronic disease anemia-we will add ESR, CRP, ferritin level repeat B12 and folate level.      DVT prophylaxis:    -TEDs/SCDs  -Lovenox    CODE STATUS:    Code Status and Medical Interventions:   Ordered at: 03/06/22 0153     Level Of Support Discussed With:    Patient     Code Status (Patient has no pulse and is not breathing):    CPR (Attempt to Resuscitate)     Medical Interventions (Patient has pulse or is breathing):    Full Support         Admission Status:  I believe this patient meets inpatient status.

## 2022-03-07 ENCOUNTER — READMISSION MANAGEMENT (OUTPATIENT)
Dept: CALL CENTER | Facility: HOSPITAL | Age: 52
End: 2022-03-07

## 2022-03-07 VITALS
OXYGEN SATURATION: 95 % | RESPIRATION RATE: 16 BRPM | BODY MASS INDEX: 42.2 KG/M2 | HEIGHT: 67 IN | SYSTOLIC BLOOD PRESSURE: 120 MMHG | WEIGHT: 268.9 LBS | TEMPERATURE: 98.3 F | DIASTOLIC BLOOD PRESSURE: 84 MMHG | HEART RATE: 91 BPM

## 2022-03-07 LAB
25(OH)D3 SERPL-MCNC: 14.9 NG/ML (ref 30–100)
ANION GAP SERPL CALCULATED.3IONS-SCNC: 11 MMOL/L (ref 5–15)
BUN SERPL-MCNC: 4 MG/DL (ref 6–20)
BUN/CREAT SERPL: 5.1 (ref 7–25)
CA-I SERPL ISE-MCNC: 1.23 MMOL/L (ref 1.12–1.32)
CALCIUM SPEC-SCNC: 8.2 MG/DL (ref 8.6–10.5)
CHLORIDE SERPL-SCNC: 106 MMOL/L (ref 98–107)
CO2 SERPL-SCNC: 22 MMOL/L (ref 22–29)
CREAT SERPL-MCNC: 0.79 MG/DL (ref 0.57–1)
EGFRCR SERPLBLD CKD-EPI 2021: 90.1 ML/MIN/1.73
GLUCOSE SERPL-MCNC: 101 MG/DL (ref 65–99)
MAGNESIUM SERPL-MCNC: 2 MG/DL (ref 1.6–2.6)
PHOSPHATE SERPL-MCNC: 3.2 MG/DL (ref 2.5–4.5)
POTASSIUM SERPL-SCNC: 4.1 MMOL/L (ref 3.5–5.2)
SODIUM SERPL-SCNC: 139 MMOL/L (ref 136–145)
VIT B12 BLD-MCNC: 404 PG/ML (ref 211–946)

## 2022-03-07 PROCEDURE — 83735 ASSAY OF MAGNESIUM: CPT | Performed by: INTERNAL MEDICINE

## 2022-03-07 PROCEDURE — 80048 BASIC METABOLIC PNL TOTAL CA: CPT | Performed by: INTERNAL MEDICINE

## 2022-03-07 PROCEDURE — 84100 ASSAY OF PHOSPHORUS: CPT | Performed by: INTERNAL MEDICINE

## 2022-03-07 PROCEDURE — 82306 VITAMIN D 25 HYDROXY: CPT | Performed by: INTERNAL MEDICINE

## 2022-03-07 PROCEDURE — 82607 VITAMIN B-12: CPT | Performed by: INTERNAL MEDICINE

## 2022-03-07 PROCEDURE — 82330 ASSAY OF CALCIUM: CPT | Performed by: INTERNAL MEDICINE

## 2022-03-07 PROCEDURE — G0378 HOSPITAL OBSERVATION PER HR: HCPCS

## 2022-03-07 PROCEDURE — 99217 PR OBSERVATION CARE DISCHARGE MANAGEMENT: CPT | Performed by: INTERNAL MEDICINE

## 2022-03-07 RX ORDER — CALCIUM CARBONATE 200(500)MG
2 TABLET,CHEWABLE ORAL 3 TIMES DAILY
Qty: 180 TABLET | Refills: 0 | Status: SHIPPED | OUTPATIENT
Start: 2022-03-07 | End: 2022-04-06

## 2022-03-07 RX ORDER — CALCIUM CARBONATE 200(500)MG
2 TABLET,CHEWABLE ORAL 2 TIMES DAILY
Status: DISCONTINUED | OUTPATIENT
Start: 2022-03-07 | End: 2022-03-07 | Stop reason: HOSPADM

## 2022-03-07 RX ADMIN — MULTIVITAMIN TABLET 1 TABLET: TABLET at 08:00

## 2022-03-07 RX ADMIN — FAMOTIDINE 40 MG: 20 TABLET, FILM COATED ORAL at 08:00

## 2022-03-07 RX ADMIN — ANTACID TABLETS 2 TABLET: 500 TABLET, CHEWABLE ORAL at 11:59

## 2022-03-07 NOTE — PROGRESS NOTES
Clinical Nutrition       Patient Name: Davina Delgado  YOB: 1970  MRN: [id number]  Date of Encounter: 22 20:01 EST  Admission date: 3/5/2022      Reason for Visit   Identified at risk by screening criteria, MST score 2+      EMR  Reviewed   Yes  Pt w hungry bone syndrome, anemia; hx parathyroid adenoma w parathyroidectomy    Standing wt 264 lbs rpt on  of wt 256 lbs Good muscle tone in evidence   Reported/Observed/Food/Nutrition Related - Comments     Pt rpt was 300+lbs; lost over 2 1/2 mo w illness but made good effort to maintain lean mass w exercise.      Current Nutrition Prescription     Diet Regular      Average Intake from [employer]% x 3 meals      Actions     Follow treatment progress, Care plan reviewed, Advise alternate selection, Menu provided    Monitor Per Protocol      Faby Berman RD,   Time Spent: 25 min

## 2022-03-07 NOTE — DISCHARGE SUMMARY
[facility]  DISCHARGE SUMMARY    Patient Name: Davina Delgado  : 1970  MRN: [id number]    Date of Admission: 3/5/2022 10:35 PM  Date of Discharge:  3/7/22  Primary Care Physician: Nils Spence MD    Consults     No orders found from 2022 to 3/6/2022.          Hospital Course     Presenting Problem:   Hungry bone syndrome [E83.81]    Active Hospital Problems    Diagnosis  POA   • **Hungry bone syndrome [E83.81]  Yes   • Anemia, chronic disease [D63.8]  Yes   • HTN (hypertension) [I10]  Yes      Resolved Hospital Problems   No resolved problems to display.          Hospital Course:  Davina Delgado is a 51 y.o. female with a history of hypertension, hyperlipidemia, DEREK, obesity who was recently admitted with hypercalcemia (16.7), significant weight loss, loss of appetite.   Elevated PTH indicated hyperparathyroidism, and the patient underwent parathyroidectomy by Dr Brar on 22, and she was discharged home on 3/02/22.  Ms Delgado returned on 3/5/22 with complaints of tingling in her fingers and toes.  Labs showed calcium of 9.8 and phosphorus of 1.8.    Hypocalcemia  Hypophosphatemia  - phosphorus replaced, 3.2 on day of discharge  - calcium 8.2 on day of discharge, discussed with General Surgery Dr Brar -- increase tums from 2 tabs BID to TID dosing.  - follow up PCP later this week with labs  - follow up NAL next week with labs  - follow up with Dr Brar as scheduled.  Patient instructed to call his office if further mild symptoms for additional adjustment of calcium and vit D supplements    HTN  - hold lisinopril (recently started last admit), currently normotensive    Left kidney lesion  - 2.3 cm low density lesion left kidney noted on CT during last hospitalization  - needs further imagining (with contrasted CT or PET) as outpatient.  Notes indicate Dr Brar may help coordinate further workup, but will need PCP oversight as  well.    Uterine mass?  - irregular calcific lesions with cm myometrial mass noted on recent CT as well.   - may need further imaging (MRI) vs referral to Gynecology.           Discharge Follow Up Recommendations for outpatient labs/diagnostics:  CMP, ionized calcium and phos level at PCP followup    Day of Discharge     HPI:   Feels much better today, no tingling in feet, a tiny bit of tingling remains in fingertips but improved since admission.    Review of Systems  Gen- No fevers, chills  CV- No chest pain, palpitations  Resp- No cough, dyspnea  GI- No N/V/D, abd pain        Vital Signs:   Temp:  [98.3 °F (36.8 °C)-99.1 °F (37.3 °C)] 98.3 °F (36.8 °C)  Heart Rate:  [86-91] 91  Resp:  [16-18] 16  BP: (118-129)/(72-84) 120/84      Physical Exam:  Constitutional - no acute distress, nontoxic, in bed  HEENT-NCAT, mucous membranes moist  CV-RRR, S1 S2 normal, no m/r/g  Resp-CTAB, no wheezes, rhonchi or rales  Abd-soft, nontender, nondistended, normoactive bowel sounds, overweight  Ext-No lower extremity cyanosis, clubbing or edema bilaterally  Neuro-alert and oriented, speech clear, moves all extremities   Psych-normal affect   Skin- No rash on exposed UE or LE bilaterally      Pertinent  and/or Most Recent Results     LAB RESULTS:      Lab 03/06/22  1015 03/06/22  1014 03/05/22  2332 03/03/22  1343 03/02/22  0533   WBC  --   --  5.67 4.57 4.60   HEMOGLOBIN  --   --  9.3* 10.9* 10.3*   HEMATOCRIT  --   --  28.8* 33.1* 33.2*   PLATELETS  --   --  374 376 324   NEUTROS ABS  --   --  2.50 2.15  --    IMMATURE GRANS (ABS)  --   --  0.01 0.01  --    LYMPHS ABS  --   --  2.31 1.74  --    MONOS ABS  --   --  0.63 0.54  --    EOS ABS  --   --  0.19 0.10  --    MCV  --   --  83.7 83.8 89.0   SED RATE  --  50*  --   --   --    CRP 0.51*  --   --   --   --          Lab 03/07/22  0332 03/06/22  1623 03/06/22  1015 03/05/22  2342 03/05/22  2331 03/03/22  1343 03/02/22  0533 03/01/22  1453 03/01/22  0530   SODIUM 139  --  138  --   135* 137 136  --   --    POTASSIUM 4.1  --  4.3  --  3.6 3.6 3.9  --   --    CHLORIDE 106  --  105  --  103 103 105  --   --    CO2 22.0  --  23.0  --  22.0 23.0 23.0  --   --    ANION GAP 11.0  --  10.0  --  10.0 11.0 8.0  --   --    BUN 4*  --  4*  --  4* 4* 4*  --   --    CREATININE 0.79  --  0.79  --  0.78 0.85 0.93  --   --    EGFR 90.1  --  90.1  --  91.5 82.6 74.1  --   --    GLUCOSE 101*  --  97  --  105* 90 93  --   --    CALCIUM 8.2*  --  8.7  --  9.8 10.4 11.2*   < > 11.5*   IONIZED CALCIUM 1.23  --  1.29 1.41*  --  1.48*  --   --   --    MAGNESIUM 2.0  --  1.6  --  1.4*  --  1.7  --  1.8   PHOSPHORUS 3.2 3.2  --   --  1.8*  --   --   --   --    TSH  --   --   --   --  0.968  --   --   --   --     < > = values in this interval not displayed.         Lab 03/06/22  1015 03/05/22  2331 03/03/22  1343   TOTAL PROTEIN 6.4 6.6 7.6   ALBUMIN 3.10* 3.20* 3.90   GLOBULIN 3.3 3.4 3.7   ALT (SGPT) 10 10 15   AST (SGOT) 15 21 25   BILIRUBIN 0.5 0.5 0.5   ALK PHOS 203* 195* 223*                 Lab 03/07/22  0332 03/06/22  1015   FERRITIN  --  179.00*   FOLATE  --  5.84   VITAMIN B 12 404  --          Brief Urine Lab Results  (Last result in the past 365 days)      Color   Clarity   Blood   Leuk Est   Nitrite   Protein   CREAT   Urine HCG        02/28/22 1442               Negative           Microbiology Results (last 10 days)     Procedure Component Value - Date/Time    COVID PRE-OP / PRE-PROCEDURE SCREENING ORDER (NO ISOLATION) - Swab, Nasopharynx [[id number]]  (Normal) Collected: 03/06/22 0108    Lab Status: Final result Specimen: Swab from Nasopharynx Updated: 03/06/22 0209    Narrative:      The following orders were created for panel order COVID PRE-OP / PRE-PROCEDURE SCREENING ORDER (NO ISOLATION) - Swab, Nasopharynx.  Procedure                               Abnormality         Status                     ---------                               -----------         ------                     COVID-19, FLU A/B, RSV  P...[[id number]]  Normal              Final result                 Please view results for these tests on the individual orders.    COVID-19, FLU A/B, RSV PCR - Swab, Nasopharynx [[id number]]  (Normal) Collected: 03/06/22 0108    Lab Status: Final result Specimen: Swab from Nasopharynx Updated: 03/06/22 0209     COVID19 Not Detected     Influenza A PCR Not Detected     Influenza B PCR Not Detected     RSV, PCR Not Detected    Narrative:      Fact sheet for providers: https://www.fda.gov/media/282895/download    Fact sheet for patients: https://www.fda.gov/media/535871/download    Test performed by PCR.          CT Abdomen Pelvis Without Contrast    Result Date: 2/24/2022  CT CHEST WO CONTRAST DIAGNOSTIC-, CT ABDOMEN PELVIS WO CONTRAST-  Date of Exam: 2/24/2022 3:05 PM  Indication: weight loss, hypercalcemia.  Comparison: None available.  Technique: Serial and axial CT images of the chest were obtained. Reconstructions in the coronal and sagittal planes were performed. Automated exposure control and iterative reconstruction methods were used.  Radiation audit for number of CT and nuclear cardiology exams performed in the last year:  0.  FINDINGS:  Chest:  Noncalcified 5 mm right lower lobe pulmonary nodule image 50.  Nodule along the major fissure measuring 3 mm on image 50, possibly a lymph node.  3 mm nodule along the right major fissure image 44 likely a lymph node.  Lungs elsewhere are clear. No pleural effusion.  Normal-sized lymph nodes of the mediastinum and elsewhere in the chest, no evidence of adenopathy. No evidence of acute process in the mediastinum on noncontrast evaluation. No pericardial effusion.  No suspicious lytic or blastic bone lesions identified.  Abdomen/pelvis:  Limited evaluation of the abdomen organs due to lack of contrast. 2.3 cm low-density lesion of the left kidney is present. Hounsfield values near 20. No noncontrast CT evidence of malignancy or metastatic disease within the organs  identified elsewhere. No evidence of acute process of the organs elsewhere. No evidence of pancreatitis. No ureteral stone or hydronephrosis either side. No nephrolithiasis of either kidney. No evidence of cholecystitis.  No bowel obstruction or other acute findings of bowel throughout the abdomen and pelvis.  Normal sized lymph nodes throughout the abdomen and pelvis, no evidence of adenopathy. No ascites.  Irregular calcific lesions are present in the posterior uterine body which appear myometrial. There is myometrial mass just inferior to the calcifications shown on sagittal image 91 measuring approximately 3 cm.  No acute or aggressive osseous findings, no findings of osseous metastatic disease.        Chest:  There are a few tiny pulmonary nodules measuring 5 mm and less, indeterminate. No evidence of lucency or metastatic disease otherwise. No acute abnormality of the chest.  Abdomen/pelvis:  2.3 cm low-density lesion of the left kidney is most likely a cyst but incompletely evaluated on noncontrast CT. Recommend pre and postcontrast CT or MRI renal mass protocol examination, especially with the provided history of suspected malignancy.  Findings of the ureters are most likely related to fibroids although the calcified masslike area potentially may relate to an endometrial lesion. In the setting of suspected malignancy further evaluation with MRI of the pelvis is recommended, or surgical consultation.  Overall limited evaluation for malignancy given lack of IV contrast. As part of further workup/follow-up contrast enhanced CT and/or PET/CT is recommended for the above.       This report was finalized on 2/24/2022 4:11 PM by Bogdan Khan.      CT Chest Without Contrast Diagnostic    Result Date: 2/24/2022  CT CHEST WO CONTRAST DIAGNOSTIC-, CT ABDOMEN PELVIS WO CONTRAST-  Date of Exam: 2/24/2022 3:05 PM  Indication: weight loss, hypercalcemia.  Comparison: None available.  Technique: Serial and axial CT images of  the chest were obtained. Reconstructions in the coronal and sagittal planes were performed. Automated exposure control and iterative reconstruction methods were used.  Radiation audit for number of CT and nuclear cardiology exams performed in the last year:  0.  FINDINGS:  Chest:  Noncalcified 5 mm right lower lobe pulmonary nodule image 50.  Nodule along the major fissure measuring 3 mm on image 50, possibly a lymph node.  3 mm nodule along the right major fissure image 44 likely a lymph node.  Lungs elsewhere are clear. No pleural effusion.  Normal-sized lymph nodes of the mediastinum and elsewhere in the chest, no evidence of adenopathy. No evidence of acute process in the mediastinum on noncontrast evaluation. No pericardial effusion.  No suspicious lytic or blastic bone lesions identified.  Abdomen/pelvis:  Limited evaluation of the abdomen organs due to lack of contrast. 2.3 cm low-density lesion of the left kidney is present. Hounsfield values near 20. No noncontrast CT evidence of malignancy or metastatic disease within the organs identified elsewhere. No evidence of acute process of the organs elsewhere. No evidence of pancreatitis. No ureteral stone or hydronephrosis either side. No nephrolithiasis of either kidney. No evidence of cholecystitis.  No bowel obstruction or other acute findings of bowel throughout the abdomen and pelvis.  Normal sized lymph nodes throughout the abdomen and pelvis, no evidence of adenopathy. No ascites.  Irregular calcific lesions are present in the posterior uterine body which appear myometrial. There is myometrial mass just inferior to the calcifications shown on sagittal image 91 measuring approximately 3 cm.  No acute or aggressive osseous findings, no findings of osseous metastatic disease.        Chest:  There are a few tiny pulmonary nodules measuring 5 mm and less, indeterminate. No evidence of lucency or metastatic disease otherwise. No acute abnormality of the chest.   Abdomen/pelvis:  2.3 cm low-density lesion of the left kidney is most likely a cyst but incompletely evaluated on noncontrast CT. Recommend pre and postcontrast CT or MRI renal mass protocol examination, especially with the provided history of suspected malignancy.  Findings of the ureters are most likely related to fibroids although the calcified masslike area potentially may relate to an endometrial lesion. In the setting of suspected malignancy further evaluation with MRI of the pelvis is recommended, or surgical consultation.  Overall limited evaluation for malignancy given lack of IV contrast. As part of further workup/follow-up contrast enhanced CT and/or PET/CT is recommended for the above.       This report was finalized on 2/24/2022 4:11 PM by Bogdan Khna.      NM Parathyroid Scan    Result Date: 2/25/2022  DATE OF EXAM: 02/25/2022 11:05 AM  PROCEDURE: NM PARATHYROID SCAN-  INDICATIONS: Hyperparathyroidism; E83.52-Hypercalcemia; E87.6-Hypokalemia; R53.1-Weakness; R63.4-Abnormal weight loss  COMPARISON: No Comparisons Available.  TECHNIQUE: 21.2 mCi of 99m labeled sestamibi was administered intravenously followed by 20-minute and two hour delayed static scintigraphic imaging of the area of the parathyroid glands  FINDINGS: Initial 20-minute images show a mildly heterogeneous pattern of uptake in the thyroid, a little greater in the left upper thyroid pole than elsewhere. There is usual activity in the submandibular glands but no other focus of increased activity. Delayed images show some washout of activity from the thyroid in general, but persistent activity in the upper-mid left thyroid pole. No other focus of increased activity is seen.      Asymmetrically increased and persistent activity in the left upper thyroid pole, whether thyroid adenoma or parathyroid adenoma.  This report was finalized on 2/25/2022 5:30 PM by Dr. Alexander Haley MD.      US Thyroid    Result Date: 2/25/2022  US THYROID-  Date of Exam:  2/25/2022 6:51 PM  Indication: patient with significant hyperparathyroidism, assess for parathyroid adenomas; E83.52-Hypercalcemia; E87.6-Hypokalemia; R53.1-Weakness; R63.4-Abnormal weight loss.  Comparison: Parathyroid scan performed today  Technique: Grayscale and color Doppler imaging of the thyroid gland was performed.  FINDINGS: The thyroid gland has homogenous echogenicity and echotexture.  Normal blood flow is present.  No significant focal nodules or masses.  No suspicious cervical chain lymph nodes.   The right lobe measures 5.8 x 1.8 x 2.4 cm.  The left lobe measures 5.4 x 1.6 x 2.2 cm.  The isthmus measures 6 mm.   Mild diffuse enlargement of the thyroid gland. Vascularity within normal limits. Within the right upper lobe there is suggestion of a tiny subtle 5 mm x 4 mm x 5 mm hypoechoic nodule although not definitive. There is artifact which shadowing.  No definitive parathyroid adenoma, although limited evaluation due to shadowing, there is poor visualization of portions of the tissues around the thyroid gland due to the shadowing.      Mild diffuse thyroid gland enlargement with normal vascularity.  Possible tiny right-sided thyroid 5 mm nodule although this is suspected to be artifactual.  No abnormality to correspond to the suggested uptake of the left upper thyroid pole region from the parathyroid scan earlier today. There is no thyroid nodule in this location. No definitive evidence of a parathyroid nodule although this ultrasound is technically limited for detection of parathyroid adenoma.  This report was finalized on 2/25/2022 8:06 PM by Bogdan Khan.      Duplex Venous Lower Extremity - Bilateral    Result Date: 3/3/2022  · Normal bilateral lower extremity venous duplex scan.        Results for orders placed during the hospital encounter of 03/03/22    Duplex Venous Lower Extremity - Bilateral    Interpretation Summary  · Normal bilateral lower extremity venous duplex scan.      Results for  orders placed during the hospital encounter of 03/03/22    Duplex Venous Lower Extremity - Bilateral    Interpretation Summary  · Normal bilateral lower extremity venous duplex scan.          Plan for Follow-up of Pending Labs/Results:     Discharge Details        Discharge Medications      Changes to Medications      Instructions Start Date   calcium carbonate 500 MG chewable tablet  Commonly known as: TUMS  What changed: when to take this   2 tablets, Oral, 3 Times Daily         Continue These Medications      Instructions Start Date   HYDROcodone-acetaminophen 7.5-325 MG per tablet  Commonly known as: NORCO   1 tablet, Oral, Every 6 Hours PRN      Multiple Vitamin Essential tablet tablet   1 tablet, Oral, Daily, OTC      ondansetron 4 MG tablet  Commonly known as: Zofran   4 mg, Oral, Every 8 Hours PRN      Vitamin D (Ergocalciferol) 50 MCG (2000 UT) capsule   2,000 Units, Oral, Daily, OTC         Stop These Medications    lisinopril 20 MG tablet  Commonly known as: PRINIVIL,ZESTRIL            Allergies   Allergen Reactions   • Lisinopril Other (See Comments)     Tingling in hands and feet         Discharge Disposition:  Home or Self Care    Diet:  Hospital:  Diet Order   Procedures   • Diet Regular       Activity:      Restrictions or Other Recommendations:         CODE STATUS:    Code Status and Medical Interventions:   Ordered at: 03/06/22 0153     Level Of Support Discussed With:    Patient     Code Status (Patient has no pulse and is not breathing):    CPR (Attempt to Resuscitate)     Medical Interventions (Patient has pulse or is breathing):    Full Support       Future Appointments   Date Time Provider Department Center   3/9/2022  2:30 PM Nils Spence MD [facility]   3/29/2022 11:30 AM Zeus Mejía MD [facility]   5/25/2022  9:30 AM Nils Spence MD [facility]       Additional Instructions for the Follow-ups that You Need to Schedule     Discharge Follow-up with PCP   As  directed       Currently Documented PCP:    Nils Spence MD    PCP Phone Number:    [phone]     Follow Up Details: followup with PCP Wednesday as scheduled         Discharge Follow-up with Specialty: follow up with General Surgery Dr Brar in 1-2 weeks   As directed      Specialty: follow up with General Surgery Dr Brar in 1-2 weeks         Discharge Follow-up with Specialty: follow up with [facility] in one week with labs   As directed      Specialty: follow up with [facility] in one week with labs                     Jc Pride MD  03/07/22      Time Spent on Discharge:  I spent  45  minutes on this discharge activity which included: face-to-face encounter with the patient, reviewing the data in the system, coordination of the care with the nursing staff as well as consultants, documentation, and entering orders.

## 2022-03-07 NOTE — PLAN OF CARE
"Goal Outcome Evaluation:  Plan of Care Reviewed With: patient        Progress: improving  Outcome Evaluation: VSS; NSR on monitor; electrolytes replaced as needed; iv's d/c 'd for now; sites were sore and swollen;ambulating in room; and up to bathroom; states \"tingling of finger tips and toes are so mch improved: denies pain or N/V; sitting side of bed; eating snack; call light within her reach; will continue to monitor  "

## 2022-03-07 NOTE — OUTREACH NOTE
Prep Survey    Flowsheet Row Responses   [facility] facility patient discharged from? [place]   Is LACE score < 7 ? Yes   Emergency Room discharge w/ pulse ox? No   Eligibility [facility]   Date of Admission 03/05/22   Date of Discharge 03/07/22   Discharge Disposition Home or Self Care   Discharge diagnosis Hypocalcemia, Hypophosphatemia d/t hungry bone syndrome, Parathyroidectomy on 2/28/22   Does the patient have one of the following disease processes/diagnoses(primary or secondary)? Other   Does the patient have Home health ordered? No   Is there a DME ordered? No   Prep survey completed? Yes          EMILY BRANNON - Registered Nurse

## 2022-03-07 NOTE — PLAN OF CARE
Goal Outcome Evaluation:  Plan of Care Reviewed With: patient        Progress: improving  Outcome Evaluation: VSS. No complaints of pain. Pt being discharged today.

## 2022-03-07 NOTE — EXTERNAL PATIENT INSTRUCTIONS
Patient Education   Table of Contents       Hypercalcemia       Hyperphosphatemia     To view videos and all your education online visit,   [email or web address]   or scan this QR code with your smartphone.                  Hypercalcemia     Hypercalcemia is when the level of calcium in a person's blood is above normal. The body needs calcium to make bones and keep them strong. Calcium also helps the muscles, nerves, brain, and heart work the way they should.   Most of the calcium in the body is in the bones. There is also some calcium in the blood. Hypercalcemia can happen when calcium comes out of the bones, or when the kidneys are not able to remove calcium from the blood. Hypercalcemia can be mild or severe.   What are the causes?    There are many possible causes of hypercalcemia. Common causes of this condition include:       Hyperparathyroidism. This is a condition in which the body produces too much parathyroid hormone. There are four parathyroid glands in your neck. These glands produce a chemical messenger (hormone) that helps the body absorb calcium from foods and helps your bones release calcium.       Certain kinds of cancer.      Less common causes of hypercalcemia include:       Getting too much calcium or vitamin D from your diet.       Kidney failure.       Hyperthyroidism.       Severe dehydration.       Being on bed rest or being inactive for a long time.       Certain medicines.       Infections.     What increases the risk?    You are more likely to develop this condition if you:       Are female.       Are 60 years of age or older.       Have a family history of hypercalcemia.     What are the signs or symptoms?    Mild hypercalcemia that starts slowly may not cause symptoms. Severe, sudden hypercalcemia is more likely to cause symptoms, such as:       Being more thirsty than usual.       Needing to urinate more often than usual.       Abdominal pain.       Nausea and vomiting.        Constipation.       Muscle pain, twitching, or weakness.       Feeling very tired.     How is this diagnosed?      Hypercalcemia is usually diagnosed with a blood test. You may also have tests to help determine what is causing this condition, such as imaging tests and more blood tests.     How is this treated?    Treatment for hypercalcemia depends on the cause. Treatment may include:       Receiving fluids through an IV.      Medicines that:       Keep calcium levels steady after receiving fluids (loop diuretics).       Keep calcium in your bones (bisphosphonates).       Lower the calcium level in your blood.       Surgery to remove overactive parathyroid glands.       A procedure that filters your blood to correct calcium levels (hemodialysis).     Follow these instructions at home:            Take over-the-counter and prescription medicines only as told by your health care provider.       Follow instructions from your health care provider about eating or drinking restrictions.       Drink enough fluid to keep your urine pale yellow.       Stay active. Weight-bearing exercise helps to keep calcium in your bones. Follow instructions from your health care provider about what type and level of exercise is safe for you.       Keep all follow-up visits as told by your health care provider. This is important.       Contact a health care provider if you have:         A fever.       A heartbeat that is irregular or very fast.       Changes in mood, memory, or personality.     Get help right away if you:         Have severe abdominal pain.       Have chest pain.       Have trouble breathing.       Become very confused and sleepy.       Lose consciousness.     Summary         Hypercalcemia is when the level of calcium in a person's blood is above normal. The body needs calcium to make bones and keep them strong. Calcium also helps the muscles, nerves, brain, and heart work the way they should.       There are many possible  causes of hypercalcemia, and treatment depends on the cause.       Take over-the-counter and prescription medicines only as told by your health care provider.       Follow instructions from your health care provider about eating or drinking restrictions.     This information is not intended to replace advice given to you by your health care provider. Make sure you discuss any questions you have with your health care provider.     Document Released: 03/03/2006Document Revised: 01/14/2020Document Reviewed: 09/23/2019     Elsevier Patient Education ? 2021 [employer]         Hyperphosphatemia     Hyperphosphatemia is the condition of having too much phosphate in your blood. Phosphate forms when a mineral called phosphorus binds with oxygen. Phosphate is important because it binds with calcium to form and support your teeth and bones. However, if too much phosphate builds up in your blood, it binds to calcium in your blood and causes low calcium levels (hypocalcemia). Too much phosphate binding to calcium can also cause stones (calcifications) to build up in your body. This can damage your kidneys, heart, and blood vessels.   What are the causes?    The most common cause of this condition is kidney failure. A diseased kidney may not filter enough phosphate out of your blood. Other causes of hyperphosphatemia include:       Taking in too much phosphate. This can happen from taking too many laxatives or using enemas that contain phosphate. It can also result from receiving too much phosphate in IV fluids.       Increased absorption of phosphate. This can happen if you take too much vitamin D. Vitamin D causes more phosphate to be absorbed in the digestive tract.      Conditions that release phosphate from inside the cells of the body (redistribution). This can happen from:       Severe muscle injury, such as a crushing injury, or any condition that causes muscle cells to break down quickly.       Cancer cells that die  quickly.       Severe infection.       Poorly controlled diabetes.       Low parathyroid hormone. This hormone stimulates excretion of phosphate in the kidney. If it gets too low, phosphate can start to build up in the blood.     What are the signs or symptoms?    In most cases, there are no symptoms of this condition. Symptoms can develop if the condition leads to hypocalcemia or calcifications, or if it occurs along with kidney failure.      Signs of hypocalcemia include:       Muscle cramps or spasms.       Numbness and tingling around the mouth.       Abnormal heart rhythms.       Seizures.      Signs of calcifications include:       Hard bumps (nodules) under the skin. The nodules may cause an itchy skin rash.       Joint pain.      Signs and symptoms of kidney failure include:       Fatigue.       Shortness of breath.       Appetite loss.       Nausea and vomiting.     How is this diagnosed?   This condition may be diagnosed based on a blood test to check whether you have a high level of phosphate. Your health care provider may suspect this condition based on your symptoms and medical history, especially if you have kidney disease or another condition that can cause hyperphosphatemia. The condition is sometimes found during a routine blood test.    You may also have other tests, including:       Blood tests for calcium, parathyroid hormone, and kidney function.       Urine tests for phosphate.       Imaging studies to look for calcifications in skin, blood vessels, kidneys, or the heart.       A test to check for physical signs of hypocalcemia. These include increased reflexes and abnormal facial muscle contractions when tapping on the face (Chvostek's sign).     How is this treated?    Hyperphosphatemia can be reversed if the underlying condition is found and treated. If there is no kidney failure, the condition may improve without treatment. If there are symptoms of kidney failure, treatment may include:        IV fluids and medicines that increase urine flow (diuretics) to flush out the phosphate.       Filtering phosphate out of the blood with a procedure called dialysis.       Medicines that bind to phosphate to pull it out of the blood.       Limiting phosphorus or phosphate in the diet.     Follow these instructions at home:            Take over-the-counter and prescription medicines only as told by your health care provider.       Follow instructions from your health care provider about eating or drinking restrictions.       Check food labels for phosphate or phosphorus.      You may need to work with a dietitian to lower the amount of phosphorus in your diet.       Foods that are low in phosphorus include fruits, vegetables, fresh or frozen meat, white rice, and white bread.       Foods that are high in phosphorus include beer, chocolate, milk, cheese, liver, and oysters. Phosphorus is also used as a food additive and in many bottled beverages.       Drink enough fluid to keep your urine pale yellow.      Do not  take vitamin D or phosphorus supplements.      Do not  take laxatives or use enemas that contain phosphorus or phosphate.       Contact a health care provider if:         You have signs of hypocalcemia.       You have itchy bumps under your skin.       You have joint pain.       You have signs or symptoms of kidney failure.     Get help right away if:         You have a seizure.       You have chest pain.       You have difficulty breathing.     Summary         Hyperphosphatemia is the condition of having too much phosphate in your blood.       Kidney failure is the most common cause of this condition.       Most people do not have signs or symptoms of hyperphosphatemia unless it leads to another condition.       Hyperphosphatemia can lead to hypocalcemia and calcifications in the body.       Treatment for hyperphosphatemia may include IV fluids, filtering the blood with dialysis, medicine to bind  phosphate, and limiting phosphorus in your diet.     This information is not intended to replace advice given to you by your health care provider. Make sure you discuss any questions you have with your health care provider.     Document Released: 09/17/2018Document Revised: 05/20/2021Document Reviewed: 05/20/2021     Elsevier Patient Education ? 2021 Elsevier Inc.

## 2022-03-08 ENCOUNTER — TRANSITIONAL CARE MANAGEMENT TELEPHONE ENCOUNTER (OUTPATIENT)
Dept: CALL CENTER | Facility: HOSPITAL | Age: 52
End: 2022-03-08

## 2022-03-08 DIAGNOSIS — Z12.11 SCREENING FOR COLON CANCER: Primary | ICD-10-CM

## 2022-03-08 NOTE — OUTREACH NOTE
Call Center TCM Note    Flowsheet Row Responses   [facility] patient discharged from? [facility]   Does the patient have one of the following disease processes/diagnoses(primary or secondary)? Other   TCM attempt successful? Yes   Discharge diagnosis Hypocalcemia, Hypophosphatemia d/t hungry bone syndrome, Parathyroidectomy on 2/28/22   Meds reviewed with patient/caregiver? Yes   Is the patient having any side effects they believe may be caused by any medication additions or changes? No   Does the patient have all medications ordered at discharge? Yes   Is the patient taking all medications as directed (includes completed medication regime)? Yes   Does the patient have a primary care provider?  Yes   Does the patient have an appointment with their PCP within 7 days of discharge? Yes   Comments regarding PCP HOSP DC FU 3/9/22 @ 2:30 pm.    Has the patient kept scheduled appointments due by today? N/A   Has home health visited the patient within 72 hours of discharge? N/A   Psychosocial issues? No   Did the patient receive a copy of their discharge instructions? Yes   Nursing interventions Reviewed instructions with patient   What is the patient's perception of their health status since discharge? Improving   Is the patient/caregiver able to teach back signs and symptoms related to disease process for when to call PCP? Yes   Is the patient/caregiver able to teach back signs and symptoms related to disease process for when to call 911? Yes   Is the patient/caregiver able to teach back the hierarchy of who to call/visit for symptoms/problems? PCP, Specialist, Home health nurse, Urgent Care, ED, 911 Yes   If the patient is a current smoker, are they able to teach back resources for cessation? Not a smoker   TCM call completed? Yes   Wrap up additional comments Pt is starting to feel better, extremities tingling is improving. All medications in place. No questions at this time. TCM FWP with PCP Dr Spence is tomorrow  03/09/2022.          Pennie Pulido MA    3/8/2022, 12:46 EST

## 2022-03-09 ENCOUNTER — OFFICE VISIT (OUTPATIENT)
Dept: FAMILY MEDICINE CLINIC | Facility: CLINIC | Age: 52
End: 2022-03-09

## 2022-03-09 ENCOUNTER — LAB (OUTPATIENT)
Dept: LAB | Facility: HOSPITAL | Age: 52
End: 2022-03-09

## 2022-03-09 VITALS
TEMPERATURE: 98 F | HEIGHT: 67 IN | BODY MASS INDEX: 42.06 KG/M2 | WEIGHT: 268 LBS | SYSTOLIC BLOOD PRESSURE: 130 MMHG | DIASTOLIC BLOOD PRESSURE: 90 MMHG | OXYGEN SATURATION: 99 % | HEART RATE: 91 BPM

## 2022-03-09 DIAGNOSIS — E83.81 HUNGRY BONE SYNDROME: ICD-10-CM

## 2022-03-09 DIAGNOSIS — E21.3 HYPERPARATHYROIDISM: Primary | ICD-10-CM

## 2022-03-09 DIAGNOSIS — E55.9 VITAMIN D DEFICIENCY: ICD-10-CM

## 2022-03-09 DIAGNOSIS — N28.1 CYST OF LEFT KIDNEY: ICD-10-CM

## 2022-03-09 DIAGNOSIS — E83.52 HYPERCALCEMIA: ICD-10-CM

## 2022-03-09 PROCEDURE — 82330 ASSAY OF CALCIUM: CPT | Performed by: FAMILY MEDICINE

## 2022-03-09 PROCEDURE — 80053 COMPREHEN METABOLIC PANEL: CPT | Performed by: FAMILY MEDICINE

## 2022-03-09 PROCEDURE — 85025 COMPLETE CBC W/AUTO DIFF WBC: CPT | Performed by: FAMILY MEDICINE

## 2022-03-09 PROCEDURE — 82746 ASSAY OF FOLIC ACID SERUM: CPT | Performed by: FAMILY MEDICINE

## 2022-03-09 PROCEDURE — 82306 VITAMIN D 25 HYDROXY: CPT | Performed by: FAMILY MEDICINE

## 2022-03-09 PROCEDURE — 83735 ASSAY OF MAGNESIUM: CPT | Performed by: FAMILY MEDICINE

## 2022-03-09 PROCEDURE — 84100 ASSAY OF PHOSPHORUS: CPT | Performed by: FAMILY MEDICINE

## 2022-03-09 PROCEDURE — 99495 TRANSJ CARE MGMT MOD F2F 14D: CPT | Performed by: FAMILY MEDICINE

## 2022-03-09 NOTE — PROGRESS NOTES
Transitional Care Follow Up Visit  Subjective     Davina Delgado is a 51 y.o. female who presents for a transitional care management visit.    Within 48 business hours after discharge our office contacted her via telephone to coordinate her care and needs.      I reviewed and discussed the details of that call along with the discharge summary, hospital problems, inpatient lab results, inpatient diagnostic studies, and consultation reports with Davina.     Current outpatient and discharge medications have been reconciled for the patient.  Reviewed by: Nils Spence MD      Date of TCM Phone Call 3/7/2022   [facility]   Date of Admission 3/5/2022   Date of Discharge 3/7/2022   Discharge Disposition Home or Self Care     Risk for Readmission (LACE) Score: 4 (3/7/2022  6:01 AM)      Ms. Delgado is a 51 year old for TCM visit. She was hospitalized from 2/24/22-3/2/22 for hypercalcemia and hyperparathyroidism s/p parathyroidectomy. She had another admission from 3/5/22-3/7/22 for hypocalcemia and hypophosphatemia. She is doing good overall. She states she is most concerned about the numbness and tingling in her bilateral distal fingers and toes, breasts, and abdomen. She was told this will slowly get better over time. She feels the numbness resolving in her toes but no where else. She states her strength and ROM is still intact. She states her scar from surgery is healing well and no signs of infection. She says her appetite and fatigue have resolved since surgery.  She denies any abdominal pain or bone pain. No mood issues    She states that she no longer has the urge for micturition. She is making herself 3-5 times a day. She has good fluid intake. She denies incontinence, dysuria, difficulty urinating. She denies catheter placement while in the hospital. She states her bowel movements are better than before her admissions.     She is having issues getting a hold of Nephrology, Dr Paredes, to schedule  her hospital follow up. She is supposed to follow up with OB/GYN in June.       Course During Hospital Stay:  Patient presented to ED on 2/24 with significant weight loss and loss of appetite and was found to have hypercalemcia at 16.7 and potassium of 2.8. She was admitted and PTH was found to the markedly elevated she underwent a sesambti scan that showed a parathyroid adenoma and she underwent parathyroidectomy with path pending and her calcium levels stablized. She had a CT that revealed a left kidney mass and possible uterine fibroids. Nephrology was consulted and outpatient constast CT v. PET recommended. She was discharged on 3/2/22.    She had second admission on 3/5/22 for numbness and tingling in hands and found to have hypocalemia and hypophosphatemia. She underwent phosphorus replacement and increased Tums from 2 tabs BID to TID     The following portions of the patient's history were reviewed and updated as appropriate: allergies, current medications, past family history, past medical history, past social history, past surgical history and problem list.    Review of Systems   Constitutional: Negative for chills, fatigue and fever.   Respiratory: Negative for shortness of breath.    Cardiovascular: Negative for chest pain.   Gastrointestinal: Negative for abdominal pain, constipation, diarrhea, nausea and vomiting.   Genitourinary: Negative for dysuria and hematuria.   Neurological: Positive for numbness (bilateral distal digits, breasts and abdomen). Negative for dizziness, syncope, light-headedness and headaches.   Psychiatric/Behavioral: Negative for dysphoric mood and self-injury.       Objective   Physical Exam  Constitutional:       Appearance: She is obese.   Neck:      Comments: Well healed scar over thyroid  Cardiovascular:      Rate and Rhythm: Normal rate and regular rhythm.      Heart sounds: Normal heart sounds. No murmur heard.    No friction rub. No gallop.   Pulmonary:      Effort:  Pulmonary effort is normal.      Breath sounds: Normal breath sounds. No wheezing, rhonchi or rales.   Abdominal:      General: Bowel sounds are normal. There is no distension.      Palpations: Abdomen is soft. There is no mass.      Tenderness: There is no abdominal tenderness.   Musculoskeletal:         General: Normal range of motion.   Neurological:      Mental Status: She is alert and oriented to person, place, and time.   Psychiatric:         Mood and Affect: Mood normal.         Behavior: Behavior normal.         Assessment/Plan   Problems Addressed this Visit        Endocrine and Metabolic    Vitamin D deficiency    Relevant Orders    CBC & Differential (Completed)    Comprehensive Metabolic Panel (Completed)    Vitamin D 25 Hydroxy (Completed)    Calcium, Ionized (Completed)    Phosphorus (Completed)    Magnesium (Completed)    Folate (Completed)    Hyperparathyroidism (HCC) - Primary     We will recheck labs today to look for stability and improvement.  Spoke with referral coordinator to help coordinate appointment with nephrology.  She will keep follow-up appointment with surgery scheduled.  Patient also has upcoming appointments with OB/GYN and gastroenterology for further evaluation.  Patient will continue supplementation with vitamin D and calcium as directed.  RTC/ED precautions given.           Relevant Orders    CBC & Differential (Completed)    Comprehensive Metabolic Panel (Completed)    Vitamin D 25 Hydroxy (Completed)    Calcium, Ionized (Completed)    Phosphorus (Completed)    Magnesium (Completed)    Folate (Completed)    Ambulatory Referral to Nephrology       Genitourinary and Reproductive     Hypercalcemia    Relevant Orders    Comprehensive Metabolic Panel (Completed)    Calcium, Ionized (Completed)    Hungry bone syndrome    Relevant Orders    Ambulatory Referral to Nephrology      Other Visit Diagnoses     Cyst of left kidney        Relevant Orders    Ambulatory Referral to Nephrology       Diagnoses       Codes Comments    Hyperparathyroidism (HCC)    -  Primary ICD-10-CM: E21.3  ICD-9-CM: 252.00     Vitamin D deficiency     ICD-10-CM: E55.9  ICD-9-CM: 268.9     Hypercalcemia     ICD-10-CM: E83.52  ICD-9-CM: 275.42     Hungry bone syndrome     ICD-10-CM: E83.81  ICD-9-CM: 275.5     Cyst of left kidney     ICD-10-CM: N28.1  ICD-9-CM: 753.10         I attest that I have reviewed the student note and that the components of the history of the present illness, the physical exam, and the assessment and plan documented were performed by me or were performed in my presence by the student and verified by me.

## 2022-03-10 LAB
25(OH)D3 SERPL-MCNC: 18 NG/ML
ALBUMIN SERPL-MCNC: 4 G/DL (ref 3.5–5.2)
ALBUMIN/GLOB SERPL: 1.1 G/DL
ALP SERPL-CCNC: 205 U/L (ref 39–117)
ALT SERPL W P-5'-P-CCNC: 10 U/L (ref 1–33)
ANION GAP SERPL CALCULATED.3IONS-SCNC: 7 MMOL/L (ref 5–15)
AST SERPL-CCNC: 18 U/L (ref 1–32)
BASOPHILS # BLD AUTO: 0.04 10*3/MM3 (ref 0–0.2)
BASOPHILS NFR BLD AUTO: 0.6 % (ref 0–1.5)
BILIRUB SERPL-MCNC: 0.4 MG/DL (ref 0–1.2)
BUN SERPL-MCNC: 6 MG/DL (ref 6–20)
BUN/CREAT SERPL: 6.8 (ref 7–25)
CA-I BLD-MCNC: 6 MG/DL
CA-I SERPL ISE-MCNC: 1.5 MMOL/L (ref 1.1–1.35)
CALCIUM SPEC-SCNC: 10.5 MG/DL (ref 8.6–10.5)
CHLORIDE SERPL-SCNC: 102 MMOL/L (ref 98–107)
CO2 SERPL-SCNC: 27 MMOL/L (ref 22–29)
CREAT SERPL-MCNC: 0.88 MG/DL (ref 0.57–1)
DEPRECATED RDW RBC AUTO: 38.7 FL (ref 37–54)
EGFRCR SERPLBLD CKD-EPI 2021: 79.2 ML/MIN/1.73
EOSINOPHIL # BLD AUTO: 0.15 10*3/MM3 (ref 0–0.4)
EOSINOPHIL NFR BLD AUTO: 2.2 % (ref 0.3–6.2)
ERYTHROCYTE [DISTWIDTH] IN BLOOD BY AUTOMATED COUNT: 12.1 % (ref 12.3–15.4)
FOLATE SERPL-MCNC: 3.95 NG/ML (ref 4.78–24.2)
GLOBULIN UR ELPH-MCNC: 3.5 GM/DL
GLUCOSE SERPL-MCNC: 85 MG/DL (ref 65–99)
HCT VFR BLD AUTO: 30.6 % (ref 34–46.6)
HGB BLD-MCNC: 9.6 G/DL (ref 12–15.9)
IMM GRANULOCYTES # BLD AUTO: 0.02 10*3/MM3 (ref 0–0.05)
IMM GRANULOCYTES NFR BLD AUTO: 0.3 % (ref 0–0.5)
LYMPHOCYTES # BLD AUTO: 1.7 10*3/MM3 (ref 0.7–3.1)
LYMPHOCYTES NFR BLD AUTO: 25 % (ref 19.6–45.3)
MAGNESIUM SERPL-MCNC: 1.7 MG/DL (ref 1.6–2.6)
MCH RBC QN AUTO: 27.7 PG (ref 26.6–33)
MCHC RBC AUTO-ENTMCNC: 31.4 G/DL (ref 31.5–35.7)
MCV RBC AUTO: 88.4 FL (ref 79–97)
MONOCYTES # BLD AUTO: 0.54 10*3/MM3 (ref 0.1–0.9)
MONOCYTES NFR BLD AUTO: 7.9 % (ref 5–12)
NEUTROPHILS NFR BLD AUTO: 4.36 10*3/MM3 (ref 1.7–7)
NEUTROPHILS NFR BLD AUTO: 64 % (ref 42.7–76)
NRBC BLD AUTO-RTO: 0 /100 WBC (ref 0–0.2)
PHOSPHATE SERPL-MCNC: 4 MG/DL (ref 2.5–4.5)
PLATELET # BLD AUTO: 518 10*3/MM3 (ref 140–450)
PMV BLD AUTO: 10 FL (ref 6–12)
POTASSIUM SERPL-SCNC: 4.7 MMOL/L (ref 3.5–5.2)
PROT SERPL-MCNC: 7.5 G/DL (ref 6–8.5)
RBC # BLD AUTO: 3.46 10*6/MM3 (ref 3.77–5.28)
SODIUM SERPL-SCNC: 136 MMOL/L (ref 136–145)
WBC NRBC COR # BLD: 6.81 10*3/MM3 (ref 3.4–10.8)

## 2022-03-10 NOTE — ASSESSMENT & PLAN NOTE
We will recheck labs today to look for stability and improvement.  Spoke with referral coordinator to help coordinate appointment with nephrology.  She will keep follow-up appointment with surgery scheduled.  Patient also has upcoming appointments with OB/GYN and gastroenterology for further evaluation.  Patient will continue supplementation with vitamin D and calcium as directed.  RTC/ED precautions given.

## 2022-03-16 ENCOUNTER — LAB (OUTPATIENT)
Dept: LAB | Facility: HOSPITAL | Age: 52
End: 2022-03-16

## 2022-03-16 ENCOUNTER — TRANSCRIBE ORDERS (OUTPATIENT)
Dept: LAB | Facility: HOSPITAL | Age: 52
End: 2022-03-16

## 2022-03-16 DIAGNOSIS — E21.0 PRIMARY HYPERPARATHYROIDISM: ICD-10-CM

## 2022-03-16 DIAGNOSIS — E21.0 PRIMARY HYPERPARATHYROIDISM: Primary | ICD-10-CM

## 2022-03-16 LAB
25(OH)D3 SERPL-MCNC: 16.6 NG/ML (ref 30–100)
CALCIUM SPEC-SCNC: 9.6 MG/DL (ref 8.6–10.5)
PTH-INTACT SERPL-MCNC: 59.9 PG/ML (ref 15–65)

## 2022-03-16 PROCEDURE — 82306 VITAMIN D 25 HYDROXY: CPT

## 2022-03-16 PROCEDURE — 36415 COLL VENOUS BLD VENIPUNCTURE: CPT

## 2022-03-16 PROCEDURE — 82310 ASSAY OF CALCIUM: CPT

## 2022-03-16 PROCEDURE — 83970 ASSAY OF PARATHORMONE: CPT

## 2022-03-24 PROBLEM — G62.9 NEUROPATHY: Status: ACTIVE | Noted: 2022-03-24

## 2022-03-25 ENCOUNTER — TELEPHONE (OUTPATIENT)
Dept: FAMILY MEDICINE CLINIC | Facility: CLINIC | Age: 52
End: 2022-03-25

## 2022-03-25 NOTE — TELEPHONE ENCOUNTER
Caller: Davina Delgado    Relationship: Self    Best call back number: [phone]    What form or medical record are you requesting: [employer]    Who is requesting this form or medical record from you: PATIENT    Timeframe paperwork needed: ASAP    Additional notes: PATIENT DROPPED OFF PAPERWORK LAST WEEK AND IT WAS SUPPOSE TO HAVE BEEN FILLED OUT AND SHE HAS YET TO HEAR FROM ANYONE SO SHE CAN COME AND  A COPY. PATIENT WAS TOLD DR PAZ WOULD NOT BE IN UNTIL 03/21/2022. PLEASE ADVISE AND CALL PATIENT BACK.

## 2022-03-28 ENCOUNTER — TELEPHONE (OUTPATIENT)
Dept: FAMILY MEDICINE CLINIC | Facility: CLINIC | Age: 52
End: 2022-03-28

## 2022-03-28 NOTE — TELEPHONE ENCOUNTER
Hub staff attempted to follow warm transfer process and was unsuccessful     Caller: Davina Delgado    Relationship to patient: Self    Best call back number: [phone]    Patient is needing: PATIENT MADE AN APPOINTMENT FOR DR PAZ ON Thursday 3/31/22 FOR RIB PAIN, BUT IS WANTING TO KNOW IF SHE COULD BE SEEN EARLIER.

## 2022-03-28 NOTE — TELEPHONE ENCOUNTER
I'm not sure what the feeling would be from. If she would like to be evaluated for this problem please have her schedule and appointment to be seen in the office.

## 2022-03-28 NOTE — TELEPHONE ENCOUNTER
"Patient had surgery recently on her thyroid and is having a sensation of \"ribs moving\" ever since she is not sure about this feeling and would like to know if you would have any idea what this is? She has been doing stretches to help but its not going away, please advise  "

## 2022-03-31 ENCOUNTER — TRANSCRIBE ORDERS (OUTPATIENT)
Dept: ADMINISTRATIVE | Facility: HOSPITAL | Age: 52
End: 2022-03-31

## 2022-03-31 DIAGNOSIS — Z12.31 SCREENING MAMMOGRAM FOR BREAST CANCER: Primary | ICD-10-CM

## 2022-04-06 ENCOUNTER — LAB (OUTPATIENT)
Dept: LAB | Facility: HOSPITAL | Age: 52
End: 2022-04-06

## 2022-04-06 ENCOUNTER — TRANSCRIBE ORDERS (OUTPATIENT)
Dept: LAB | Facility: HOSPITAL | Age: 52
End: 2022-04-06

## 2022-04-06 DIAGNOSIS — E21.0 PRIMARY HYPERPARATHYROIDISM: Primary | ICD-10-CM

## 2022-04-06 DIAGNOSIS — E21.0 PRIMARY HYPERPARATHYROIDISM: ICD-10-CM

## 2022-04-06 LAB
CALCIUM SPEC-SCNC: 8.7 MG/DL (ref 8.6–10.5)
PTH-INTACT SERPL-MCNC: 228 PG/ML (ref 15–65)

## 2022-04-06 PROCEDURE — 83970 ASSAY OF PARATHORMONE: CPT

## 2022-04-06 PROCEDURE — 36415 COLL VENOUS BLD VENIPUNCTURE: CPT

## 2022-04-06 PROCEDURE — 82310 ASSAY OF CALCIUM: CPT

## 2022-04-23 ENCOUNTER — HOSPITAL ENCOUNTER (OUTPATIENT)
Dept: MAMMOGRAPHY | Facility: HOSPITAL | Age: 52
Discharge: HOME | End: 2022-04-23
Admitting: FAMILY MEDICINE

## 2022-04-23 DIAGNOSIS — Z12.31 SCREENING MAMMOGRAM FOR BREAST CANCER: ICD-10-CM

## 2022-04-23 PROCEDURE — 77063 BREAST TOMOSYNTHESIS BI: CPT

## 2022-04-23 PROCEDURE — 77063 BREAST TOMOSYNTHESIS BI: CPT | Performed by: RADIOLOGY

## 2022-04-23 PROCEDURE — 77067 SCR MAMMO BI INCL CAD: CPT | Performed by: RADIOLOGY

## 2022-04-23 PROCEDURE — 77067 SCR MAMMO BI INCL CAD: CPT

## 2022-04-27 ENCOUNTER — TELEPHONE (OUTPATIENT)
Dept: FAMILY MEDICINE CLINIC | Facility: CLINIC | Age: 52
End: 2022-04-27

## 2022-05-06 ENCOUNTER — LAB (OUTPATIENT)
Dept: LAB | Facility: HOSPITAL | Age: 52
End: 2022-05-06

## 2022-05-06 ENCOUNTER — TRANSCRIBE ORDERS (OUTPATIENT)
Dept: LAB | Facility: HOSPITAL | Age: 52
End: 2022-05-06

## 2022-05-06 DIAGNOSIS — E21.0 PRIMARY HYPERPARATHYROIDISM: Primary | ICD-10-CM

## 2022-05-06 LAB
CALCIUM SPEC-SCNC: 9.9 MG/DL (ref 8.6–10.5)
PTH-INTACT SERPL-MCNC: 87.2 PG/ML (ref 15–65)

## 2022-05-06 PROCEDURE — 36415 COLL VENOUS BLD VENIPUNCTURE: CPT | Performed by: SURGERY

## 2022-05-06 PROCEDURE — 82310 ASSAY OF CALCIUM: CPT | Performed by: SURGERY

## 2022-05-06 PROCEDURE — 83970 ASSAY OF PARATHORMONE: CPT | Performed by: SURGERY

## 2022-05-25 ENCOUNTER — OFFICE VISIT (OUTPATIENT)
Dept: FAMILY MEDICINE CLINIC | Facility: CLINIC | Age: 52
End: 2022-05-25

## 2022-05-25 VITALS
DIASTOLIC BLOOD PRESSURE: 90 MMHG | HEART RATE: 82 BPM | SYSTOLIC BLOOD PRESSURE: 140 MMHG | TEMPERATURE: 98 F | OXYGEN SATURATION: 98 % | WEIGHT: 239 LBS | HEIGHT: 67 IN | BODY MASS INDEX: 37.51 KG/M2

## 2022-05-25 DIAGNOSIS — G62.9 NEUROPATHY: ICD-10-CM

## 2022-05-25 DIAGNOSIS — I10 PRIMARY HYPERTENSION: Primary | ICD-10-CM

## 2022-05-25 PROCEDURE — 99213 OFFICE O/P EST LOW 20 MIN: CPT | Performed by: FAMILY MEDICINE

## 2022-05-25 RX ORDER — ERGOCALCIFEROL 1.25 MG/1
50000 CAPSULE ORAL WEEKLY
COMMUNITY
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
## 2022-05-26 NOTE — ASSESSMENT & PLAN NOTE
Hypertension is mildly elevated today which she feels is due to stress.  Gets well-controlled blood pressures at home.  .  Patient will continue to work on diet and exercise.  She will also continue to monitor blood pressure at home.  If it continues to be elevated at follow-up visit we will discuss starting antihypertensive medication.  Blood pressure will be reassessed in 3 months.

## 2022-05-26 NOTE — ASSESSMENT & PLAN NOTE
Peripheral neuropathy slowly improving.  Patient was advised begin folic acid supplementation as her folic acid levels were slightly decreased.  I suspect that his neuropathy is partially secondary to hyperparathyriodism and will continue to slowly improve as her vitamin levels returned to normal.
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
## 2022-06-27 ENCOUNTER — OFFICE VISIT (OUTPATIENT)
Dept: OBSTETRICS AND GYNECOLOGY | Facility: CLINIC | Age: 52
End: 2022-06-27

## 2022-06-27 VITALS
WEIGHT: 234.7 LBS | HEIGHT: 67 IN | SYSTOLIC BLOOD PRESSURE: 142 MMHG | DIASTOLIC BLOOD PRESSURE: 100 MMHG | BODY MASS INDEX: 36.84 KG/M2 | RESPIRATION RATE: 14 BRPM

## 2022-06-27 DIAGNOSIS — I10 PRIMARY HYPERTENSION: Primary | ICD-10-CM

## 2022-06-27 DIAGNOSIS — Z01.419 WOMEN'S ANNUAL ROUTINE GYNECOLOGICAL EXAMINATION: ICD-10-CM

## 2022-06-27 PROCEDURE — 99396 PREV VISIT EST AGE 40-64: CPT | Performed by: STUDENT IN AN ORGANIZED HEALTH CARE EDUCATION/TRAINING PROGRAM

## 2022-06-27 RX ORDER — PHENOL 1.4 %
600 AEROSOL, SPRAY (ML) MUCOUS MEMBRANE DAILY
COMMUNITY

## 2022-07-29 ENCOUNTER — LAB (OUTPATIENT)
Dept: LAB | Facility: HOSPITAL | Age: 52
End: 2022-07-29

## 2022-07-29 ENCOUNTER — TRANSCRIBE ORDERS (OUTPATIENT)
Dept: LAB | Facility: HOSPITAL | Age: 52
End: 2022-07-29

## 2022-07-29 DIAGNOSIS — E55.9 AVITAMINOSIS D: ICD-10-CM

## 2022-07-29 DIAGNOSIS — E55.9 AVITAMINOSIS D: Primary | ICD-10-CM

## 2022-07-29 LAB
25(OH)D3 SERPL-MCNC: 41.4 NG/ML (ref 30–100)
ALBUMIN SERPL-MCNC: 4.6 G/DL (ref 3.5–5.2)
ANION GAP SERPL CALCULATED.3IONS-SCNC: 11.4 MMOL/L (ref 5–15)
BUN SERPL-MCNC: 9 MG/DL (ref 6–20)
BUN/CREAT SERPL: 9 (ref 7–25)
CALCIUM SPEC-SCNC: 9.9 MG/DL (ref 8.6–10.5)
CHLORIDE SERPL-SCNC: 103 MMOL/L (ref 98–107)
CO2 SERPL-SCNC: 24.6 MMOL/L (ref 22–29)
CREAT SERPL-MCNC: 1 MG/DL (ref 0.57–1)
EGFRCR SERPLBLD CKD-EPI 2021: 67.5 ML/MIN/1.73
GLUCOSE SERPL-MCNC: 85 MG/DL (ref 65–99)
PHOSPHATE SERPL-MCNC: 4.3 MG/DL (ref 2.5–4.5)
POTASSIUM SERPL-SCNC: 4.1 MMOL/L (ref 3.5–5.2)
SODIUM SERPL-SCNC: 139 MMOL/L (ref 136–145)

## 2022-07-29 PROCEDURE — 36415 COLL VENOUS BLD VENIPUNCTURE: CPT

## 2022-07-29 PROCEDURE — 82306 VITAMIN D 25 HYDROXY: CPT

## 2022-07-29 PROCEDURE — 80069 RENAL FUNCTION PANEL: CPT

## 2022-08-25 ENCOUNTER — OFFICE VISIT (OUTPATIENT)
Dept: FAMILY MEDICINE CLINIC | Facility: CLINIC | Age: 52
End: 2022-08-25

## 2022-08-25 ENCOUNTER — LAB (OUTPATIENT)
Dept: LAB | Facility: HOSPITAL | Age: 52
End: 2022-08-25

## 2022-08-25 VITALS
WEIGHT: 245 LBS | BODY MASS INDEX: 38.45 KG/M2 | HEIGHT: 67 IN | OXYGEN SATURATION: 97 % | DIASTOLIC BLOOD PRESSURE: 90 MMHG | HEART RATE: 71 BPM | TEMPERATURE: 97 F | SYSTOLIC BLOOD PRESSURE: 140 MMHG

## 2022-08-25 DIAGNOSIS — E21.3 HYPERPARATHYROIDISM: ICD-10-CM

## 2022-08-25 DIAGNOSIS — E83.52 HYPERCALCEMIA: ICD-10-CM

## 2022-08-25 DIAGNOSIS — I10 PRIMARY HYPERTENSION: ICD-10-CM

## 2022-08-25 DIAGNOSIS — G62.9 NEUROPATHY: ICD-10-CM

## 2022-08-25 DIAGNOSIS — D63.8 ANEMIA, CHRONIC DISEASE: ICD-10-CM

## 2022-08-25 DIAGNOSIS — E55.9 VITAMIN D DEFICIENCY: ICD-10-CM

## 2022-08-25 DIAGNOSIS — E21.3 HYPERPARATHYROIDISM: Primary | ICD-10-CM

## 2022-08-25 LAB
ALBUMIN SERPL-MCNC: 4.2 G/DL (ref 3.5–5.2)
ALBUMIN/GLOB SERPL: 1.3 G/DL
ALP SERPL-CCNC: 78 U/L (ref 39–117)
ALT SERPL W P-5'-P-CCNC: 7 U/L (ref 1–33)
ANION GAP SERPL CALCULATED.3IONS-SCNC: 9 MMOL/L (ref 5–15)
AST SERPL-CCNC: 18 U/L (ref 1–32)
BASOPHILS # BLD AUTO: 0.04 10*3/MM3 (ref 0–0.2)
BASOPHILS NFR BLD AUTO: 0.7 % (ref 0–1.5)
BILIRUB SERPL-MCNC: 0.5 MG/DL (ref 0–1.2)
BUN SERPL-MCNC: 9 MG/DL (ref 6–20)
BUN/CREAT SERPL: 11.7 (ref 7–25)
CALCIUM SPEC-SCNC: 10 MG/DL (ref 8.6–10.5)
CALCIUM SPEC-SCNC: 9.8 MG/DL (ref 8.6–10.5)
CHLORIDE SERPL-SCNC: 104 MMOL/L (ref 98–107)
CO2 SERPL-SCNC: 26 MMOL/L (ref 22–29)
CREAT SERPL-MCNC: 0.77 MG/DL (ref 0.57–1)
DEPRECATED RDW RBC AUTO: 35.6 FL (ref 37–54)
EGFRCR SERPLBLD CKD-EPI 2021: 92.4 ML/MIN/1.73
EOSINOPHIL # BLD AUTO: 0.19 10*3/MM3 (ref 0–0.4)
EOSINOPHIL NFR BLD AUTO: 3.6 % (ref 0.3–6.2)
ERYTHROCYTE [DISTWIDTH] IN BLOOD BY AUTOMATED COUNT: 11.6 % (ref 12.3–15.4)
FOLATE SERPL-MCNC: 7.5 NG/ML (ref 4.78–24.2)
GLOBULIN UR ELPH-MCNC: 3.3 GM/DL
GLUCOSE SERPL-MCNC: 76 MG/DL (ref 65–99)
HCT VFR BLD AUTO: 34 % (ref 34–46.6)
HGB BLD-MCNC: 10.9 G/DL (ref 12–15.9)
IMM GRANULOCYTES # BLD AUTO: 0.01 10*3/MM3 (ref 0–0.05)
IMM GRANULOCYTES NFR BLD AUTO: 0.2 % (ref 0–0.5)
LYMPHOCYTES # BLD AUTO: 2.05 10*3/MM3 (ref 0.7–3.1)
LYMPHOCYTES NFR BLD AUTO: 38.3 % (ref 19.6–45.3)
MCH RBC QN AUTO: 26.7 PG (ref 26.6–33)
MCHC RBC AUTO-ENTMCNC: 32.1 G/DL (ref 31.5–35.7)
MCV RBC AUTO: 83.3 FL (ref 79–97)
MONOCYTES # BLD AUTO: 0.37 10*3/MM3 (ref 0.1–0.9)
MONOCYTES NFR BLD AUTO: 6.9 % (ref 5–12)
NEUTROPHILS NFR BLD AUTO: 2.69 10*3/MM3 (ref 1.7–7)
NEUTROPHILS NFR BLD AUTO: 50.3 % (ref 42.7–76)
NRBC BLD AUTO-RTO: 0 /100 WBC (ref 0–0.2)
PLATELET # BLD AUTO: 336 10*3/MM3 (ref 140–450)
PMV BLD AUTO: 10.1 FL (ref 6–12)
POTASSIUM SERPL-SCNC: 4.2 MMOL/L (ref 3.5–5.2)
PROT SERPL-MCNC: 7.5 G/DL (ref 6–8.5)
PTH-INTACT SERPL-MCNC: 43.6 PG/ML (ref 15–65)
RBC # BLD AUTO: 4.08 10*6/MM3 (ref 3.77–5.28)
SODIUM SERPL-SCNC: 139 MMOL/L (ref 136–145)
WBC NRBC COR # BLD: 5.35 10*3/MM3 (ref 3.4–10.8)

## 2022-08-25 PROCEDURE — 85025 COMPLETE CBC W/AUTO DIFF WBC: CPT

## 2022-08-25 PROCEDURE — 83970 ASSAY OF PARATHORMONE: CPT

## 2022-08-25 PROCEDURE — 80053 COMPREHEN METABOLIC PANEL: CPT

## 2022-08-25 PROCEDURE — 99214 OFFICE O/P EST MOD 30 MIN: CPT | Performed by: FAMILY MEDICINE

## 2022-08-25 PROCEDURE — 82746 ASSAY OF FOLIC ACID SERUM: CPT

## 2022-08-25 RX ORDER — LANOLIN ALCOHOL/MO/W.PET/CERES
50 CREAM (GRAM) TOPICAL DAILY
COMMUNITY

## 2022-08-25 RX ORDER — FOLIC ACID 1 MG/1
1 TABLET ORAL DAILY
COMMUNITY

## 2022-08-25 NOTE — ASSESSMENT & PLAN NOTE
Patient currently follows with nephrology.  We will check labs today.  Patient was advised to contact nephrology for next follow-up appointment.  She was also asked to have her nephrologist fax us their notes as we have not received anything from them.

## 2022-08-25 NOTE — PROGRESS NOTES
Davina Delgado is a 52 y.o. female who presents today for Numbness      Patient continues to have tingling and numbness in fingers toes and around the waist. It continues to slowly improve. She was seen by nephrology on July 29th and states that they told her that her calcium and vitamin d were improving. She has been taking vit d supplement, a calcium supplement, vitamin B, and folic acid supplement. She has not been checking her blood pressure at home. He blood pressure remains mildly elevated. She does not currently take any blood pressure medications. She took lisinopril in the past but it caused itching. She has been walking more for exercise and has been working on diet.        Review of Systems   Constitutional: Negative for fever and unexpected weight loss.   HENT: Negative for congestion, ear pain and sore throat.    Eyes: Negative for visual disturbance.   Respiratory: Negative for cough, shortness of breath and wheezing.    Cardiovascular: Negative for chest pain and palpitations.   Gastrointestinal: Negative for abdominal pain, blood in stool, constipation, diarrhea, nausea, vomiting and GERD.   Endocrine: Negative for polydipsia and polyuria.   Genitourinary: Negative for difficulty urinating.   Musculoskeletal: Negative for joint swelling.   Skin: Negative for rash and skin lesions.   Allergic/Immunologic: Negative for environmental allergies.   Neurological: Positive for numbness (chronic and improving). Negative for seizures and syncope.   Hematological: Does not bruise/bleed easily.   Psychiatric/Behavioral: Negative for suicidal ideas.        The following portions of the patient's history were reviewed and updated as appropriate: allergies, current medications, past family history, past medical history, past social history, past surgical history and problem list.    Current Outpatient Medications on File Prior to Visit   Medication Sig Dispense Refill   • Blood Pressure Monitoring (Blood  "Pressure Kit) device 1 kit 2 (Two) Times a Day. 1 each 0   • calcium carbonate (OS-VAMSI) 600 MG tablet Take 600 mg by mouth Daily.     • folic acid (FOLVITE) 1 MG tablet Take 1 mg by mouth Daily.     • multivitamin (THERAGRAN) tablet tablet Take 1 tablet by mouth Daily. OTC     • thiamine (VITAMIN B-1) 100 MG tablet  tablet Take 100 mg by mouth Daily.     • vitamin B-6 (PYRIDOXINE) 50 MG tablet Take 50 mg by mouth Daily.     • vitamin D (ERGOCALCIFEROL) 1.25 MG (60909 UT) capsule capsule Take 50,000 Units by mouth 1 (One) Time Per Week.     • [DISCONTINUED] ondansetron (Zofran) 4 MG tablet Take 1 tablet by mouth Every 8 (Eight) Hours As Needed for Nausea or Vomiting. 30 tablet 2     No current facility-administered medications on file prior to visit.       Allergies   Allergen Reactions   • Lisinopril Other (See Comments)     Tingling in hands and feet        Visit Vitals  /90   Pulse 71   Temp 97 °F (36.1 °C)   Ht 170.2 cm (67\")   Wt 111 kg (245 lb)   SpO2 97%   BMI 38.37 kg/m²        Physical Exam  Constitutional:       General: She is not in acute distress.     Appearance: She is well-developed. She is not diaphoretic.   HENT:      Head: Atraumatic.   Cardiovascular:      Rate and Rhythm: Normal rate and regular rhythm.      Heart sounds: Normal heart sounds. No murmur heard.    No friction rub. No gallop.   Pulmonary:      Effort: Pulmonary effort is normal. No respiratory distress.      Breath sounds: Normal breath sounds. No stridor. No wheezing, rhonchi or rales.   Musculoskeletal:      Cervical back: Normal range of motion and neck supple.   Skin:     General: Skin is warm and dry.   Neurological:      Mental Status: She is alert and oriented to person, place, and time.   Psychiatric:         Behavior: Behavior normal.           Problems Addressed this Visit        Cardiac and Vasculature    HTN (hypertension)     Hypertension is unchanged.  Dietary sodium restriction.  Weight loss.  Regular aerobic " exercise.  Ambulatory blood pressure monitoring.  Patient will obtain blood pressure cuff and begin monitoring blood pressure at home.  She will bring blood pressure log and blood pressure cuff into her follow-up visit.  Her blood pressure remains elevated at that time and is elevated in the outpatient setting we will discuss starting a hypertensive medication.  She would like to avoid medication if possible.  Blood pressure will be reassessed in 3 months.            Endocrine and Metabolic    Vitamin D deficiency    Hyperparathyroidism (HCC) - Primary     Patient currently follows with nephrology.  We will check labs today.  Patient was advised to contact nephrology for next follow-up appointment.  She was also asked to have her nephrologist fax us their notes as we have not received anything from them.         Relevant Orders    PTH, Intact & Calcium    Folate    CBC & Differential    Comprehensive Metabolic Panel       Genitourinary and Reproductive     Hypercalcemia    Relevant Orders    PTH, Intact & Calcium    Comprehensive Metabolic Panel       Hematology and Neoplasia    Anemia, chronic disease    Relevant Medications    folic acid (FOLVITE) 1 MG tablet    Other Relevant Orders    CBC & Differential    Comprehensive Metabolic Panel       Neuro    Neuropathy     Nephropathy continues to slowly improve.  She will continue vitamin supplementation.           Diagnoses       Codes Comments    Hyperparathyroidism (HCC)    -  Primary ICD-10-CM: E21.3  ICD-9-CM: 252.00     Vitamin D deficiency     ICD-10-CM: E55.9  ICD-9-CM: 268.9     Hypercalcemia     ICD-10-CM: E83.52  ICD-9-CM: 275.42     Anemia, chronic disease     ICD-10-CM: D63.8  ICD-9-CM: 285.29     Primary hypertension     ICD-10-CM: I10  ICD-9-CM: 401.9     Neuropathy     ICD-10-CM: G62.9  ICD-9-CM: 355.9           Return in about 3 months (around 11/25/2022) for Follow-up hypertension and neuropathy.    Nils Spence MD   8/25/2022

## 2022-08-25 NOTE — ASSESSMENT & PLAN NOTE
Hypertension is unchanged.  Dietary sodium restriction.  Weight loss.  Regular aerobic exercise.  Ambulatory blood pressure monitoring.  Patient will obtain blood pressure cuff and begin monitoring blood pressure at home.  She will bring blood pressure log and blood pressure cuff into her follow-up visit.  Her blood pressure remains elevated at that time and is elevated in the outpatient setting we will discuss starting a hypertensive medication.  She would like to avoid medication if possible.  Blood pressure will be reassessed in 3 months.

## 2022-09-12 NOTE — PROGRESS NOTES
"Subjective   Chief Complaint   Patient presents with   • Vaginal Bleeding     Started spotting pinkish a week ago, turned bright red 3 days ago. As of today it's very heavy to where she is wearing a tampon and a pad.      Davina Delgado is a 52 y.o. year old .  Patient's last menstrual period was 2021 (exact date).  She reports about a week ago she had light pink spotting that progressively became heavier. Today she has gone through 1 tampon/pad so far, and on her heaviest days, she had to go through 2. She endorses dime sized clots today, but no abdominal pain or cramping. She has had some nausea, but denies chest pain, shortness of breath or dizziness; endorses fatigue.     The following portions of the patient's history were reviewed and updated as appropriate:current medications, allergies, past medical history and past surgical history    Social History    Tobacco Use      Smoking status: Never Smoker      Smokeless tobacco: Never Used         Objective   /100 (BP Location: Right arm, Patient Position: Sitting, Cuff Size: Large Adult)   Resp 14   Ht 170.2 cm (67\")   Wt 113 kg (249 lb)   LMP 2021 (Exact Date)   Breastfeeding No   BMI 39.00 kg/m²     General:  well developed; well nourished  no acute distress   Lungs:  breathing is unlabored   Heart:  Not performed.   Pelvis: Clinical staff was present for exam  External genitalia:  normal appearance of the external genitalia including Bartholin's and [place]'s glands.  :  urethral meatus normal;  Vaginal:  normal pink mucosa without prolapse or lesions. blood present -  moderate amount;  Cervix:  normal appearance.     Lab Review   CBC and CMP    Imaging   Pelvic ultrasound report        Assessment   1. PMB  2. Fatigue     Plan   1. TVUS today demonstrating endometrial echo measuring 8mm with multiple fibroids, the largest being pedunculated and measuring approximately 3x3x3 cm. Official read pending.  2. EMBx performed today " as well for PMB, see note below for details.   3. Prescription for Provera 10 mg p.o. twice daily for 1 week then daily sent to patient pharmacy for bleeding control.  Discussed next steps pending pathology results.  For example if endometrial polyp identified, would recommend hysteroscopy D&C, etc. And continue Provera in the interim. Patient voiced understanding and all questions answered to best my ability.  Patient in agreement with plan of care.  4. CBC and CMP ordered for fatigue, overall both wnl with no evidence of acute severe anemia.   5. The importance of keeping all planned follow-up and taking all medications as prescribed was emphasized.  6. Follow up pending pathology results and bleeding control     New Medications Ordered This Visit   Medications   • medroxyPROGESTERone (Provera) 10 MG tablet     Sig: Take 1 tablet by mouth 2 (Two) Times a Day. BID for 7 days then back down to daily use afterwards     Dispense:  60 tablet     Refill:  3          This note was electronically signed.    Cate Diop MD   September 14, 2022    Endometrial Biopsy    Date of procedure:  9/14/2022    Procedure documentation:    The cervix was grasped anterior at the 12 o'clock position.  The cavity sounded to 9 centimeters.  An endometrial biopsy specimen was obtained after 1 pass.  The tissue was sent for permanent histopathologic evaluation.  Tenaculum was removed from the cervix with scant bleeding.    Post procedure instructions: She was instructed to call us in 1 week's time if she has not heard from us otherwise.  If there is any significant fever or excessive bleeding or pain she is to call immediately.    This note was electronically signed.    Cate Diop MD  September 14, 2022

## 2022-09-13 ENCOUNTER — OFFICE VISIT (OUTPATIENT)
Dept: OBSTETRICS AND GYNECOLOGY | Facility: CLINIC | Age: 52
End: 2022-09-13

## 2022-09-13 ENCOUNTER — LAB (OUTPATIENT)
Dept: LAB | Facility: HOSPITAL | Age: 52
End: 2022-09-13

## 2022-09-13 VITALS
WEIGHT: 249 LBS | DIASTOLIC BLOOD PRESSURE: 100 MMHG | RESPIRATION RATE: 14 BRPM | HEIGHT: 67 IN | SYSTOLIC BLOOD PRESSURE: 142 MMHG | BODY MASS INDEX: 39.08 KG/M2

## 2022-09-13 DIAGNOSIS — R53.83 FATIGUE, UNSPECIFIED TYPE: ICD-10-CM

## 2022-09-13 DIAGNOSIS — N93.9 ABNORMAL UTERINE BLEEDING (AUB): ICD-10-CM

## 2022-09-13 DIAGNOSIS — N95.0 PMB (POSTMENOPAUSAL BLEEDING): Primary | ICD-10-CM

## 2022-09-13 LAB
ALBUMIN SERPL-MCNC: 4.5 G/DL (ref 3.5–5.2)
ALBUMIN/GLOB SERPL: 1.4 G/DL
ALP SERPL-CCNC: 81 U/L (ref 39–117)
ALT SERPL W P-5'-P-CCNC: 10 U/L (ref 1–33)
ANION GAP SERPL CALCULATED.3IONS-SCNC: 9.9 MMOL/L (ref 5–15)
AST SERPL-CCNC: 12 U/L (ref 1–32)
BILIRUB SERPL-MCNC: 0.4 MG/DL (ref 0–1.2)
BUN SERPL-MCNC: 8 MG/DL (ref 6–20)
BUN/CREAT SERPL: 11 (ref 7–25)
CALCIUM SPEC-SCNC: 9.6 MG/DL (ref 8.6–10.5)
CHLORIDE SERPL-SCNC: 103 MMOL/L (ref 98–107)
CO2 SERPL-SCNC: 25.1 MMOL/L (ref 22–29)
CREAT SERPL-MCNC: 0.73 MG/DL (ref 0.57–1)
EGFRCR SERPLBLD CKD-EPI 2021: 98.5 ML/MIN/1.73
GLOBULIN UR ELPH-MCNC: 3.2 GM/DL
GLUCOSE SERPL-MCNC: 76 MG/DL (ref 65–99)
POTASSIUM SERPL-SCNC: 4.4 MMOL/L (ref 3.5–5.2)
PROT SERPL-MCNC: 7.7 G/DL (ref 6–8.5)
SODIUM SERPL-SCNC: 138 MMOL/L (ref 136–145)

## 2022-09-13 PROCEDURE — 99213 OFFICE O/P EST LOW 20 MIN: CPT | Performed by: STUDENT IN AN ORGANIZED HEALTH CARE EDUCATION/TRAINING PROGRAM

## 2022-09-13 PROCEDURE — 85025 COMPLETE CBC W/AUTO DIFF WBC: CPT

## 2022-09-13 PROCEDURE — 80053 COMPREHEN METABOLIC PANEL: CPT

## 2022-09-13 RX ORDER — MEDROXYPROGESTERONE ACETATE 10 MG/1
10 TABLET ORAL 2 TIMES DAILY
Qty: 60 TABLET | Refills: 3 | Status: SHIPPED | OUTPATIENT
Start: 2022-09-13

## 2022-09-14 LAB
BASOPHILS # BLD AUTO: 0.04 10*3/MM3 (ref 0–0.2)
BASOPHILS NFR BLD AUTO: 0.8 % (ref 0–1.5)
DEPRECATED RDW RBC AUTO: 37.5 FL (ref 37–54)
EOSINOPHIL # BLD AUTO: 0.16 10*3/MM3 (ref 0–0.4)
EOSINOPHIL NFR BLD AUTO: 3.1 % (ref 0.3–6.2)
ERYTHROCYTE [DISTWIDTH] IN BLOOD BY AUTOMATED COUNT: 12.2 % (ref 12.3–15.4)
HCT VFR BLD AUTO: 35.5 % (ref 34–46.6)
HGB BLD-MCNC: 11.6 G/DL (ref 12–15.9)
IMM GRANULOCYTES # BLD AUTO: 0.01 10*3/MM3 (ref 0–0.05)
IMM GRANULOCYTES NFR BLD AUTO: 0.2 % (ref 0–0.5)
LYMPHOCYTES # BLD AUTO: 2.46 10*3/MM3 (ref 0.7–3.1)
LYMPHOCYTES NFR BLD AUTO: 47.4 % (ref 19.6–45.3)
MCH RBC QN AUTO: 27.2 PG (ref 26.6–33)
MCHC RBC AUTO-ENTMCNC: 32.7 G/DL (ref 31.5–35.7)
MCV RBC AUTO: 83.3 FL (ref 79–97)
MONOCYTES # BLD AUTO: 0.3 10*3/MM3 (ref 0.1–0.9)
MONOCYTES NFR BLD AUTO: 5.8 % (ref 5–12)
NEUTROPHILS NFR BLD AUTO: 2.22 10*3/MM3 (ref 1.7–7)
NEUTROPHILS NFR BLD AUTO: 42.7 % (ref 42.7–76)
NRBC BLD AUTO-RTO: 0 /100 WBC (ref 0–0.2)
PLATELET # BLD AUTO: 285 10*3/MM3 (ref 140–450)
PMV BLD AUTO: 10 FL (ref 6–12)
RBC # BLD AUTO: 4.26 10*6/MM3 (ref 3.77–5.28)
REF LAB TEST METHOD: NORMAL
WBC NRBC COR # BLD: 5.19 10*3/MM3 (ref 3.4–10.8)

## 2022-09-27 ENCOUNTER — OFFICE VISIT (OUTPATIENT)
Dept: OBSTETRICS AND GYNECOLOGY | Facility: CLINIC | Age: 52
End: 2022-09-27

## 2022-09-27 VITALS
DIASTOLIC BLOOD PRESSURE: 110 MMHG | SYSTOLIC BLOOD PRESSURE: 140 MMHG | HEIGHT: 67 IN | BODY MASS INDEX: 38.31 KG/M2 | WEIGHT: 244.1 LBS | RESPIRATION RATE: 14 BRPM

## 2022-09-27 DIAGNOSIS — N95.0 PMB (POSTMENOPAUSAL BLEEDING): Primary | ICD-10-CM

## 2022-09-27 PROCEDURE — 99213 OFFICE O/P EST LOW 20 MIN: CPT | Performed by: STUDENT IN AN ORGANIZED HEALTH CARE EDUCATION/TRAINING PROGRAM

## 2022-09-27 NOTE — PROGRESS NOTES
"Subjective   Chief Complaint   Patient presents with   • Follow-up     Patient started bleeding last night     Davina Delgado is a 52 y.o. year old .  Patient's last menstrual period was 2022.  She presents for follow up PMB.  She was taking the Provera 10 mg twice daily, and then switch to once daily dosing.  However she started bleeding again last night after switching to the 1 time daily dosing.     The following portions of the patient's history were reviewed and updated as appropriate:current medications, allergies and past medical history    Social History    Tobacco Use      Smoking status: Never Smoker      Smokeless tobacco: Never Used         Objective   BP (!) 140/110 (BP Location: Right arm, Patient Position: Sitting, Cuff Size: Large Adult)   Resp 14   Ht 170.2 cm (67\")   Wt 111 kg (244 lb 1.6 oz)   LMP 2022   Breastfeeding No   BMI 38.23 kg/m²     General:  well developed; well nourished  no acute distress   Lungs:  breathing is unlabored   Heart:  Not performed.     Lab Review   ENDOMETRIUM, BIOPSY:   Fragmented benign proliferative phase endometrium   Extensive stromal breakdown with fibrin thrombi consistent with bleeding   Negative for polyp, hyperplasia and carcinoma     Imaging   Pelvic ultrasound report        Assessment   1. PMB     Plan   1. Due to persistent PMB despite Provera therapy with proliferative endometrium on biopsy, recommend [facility] D&C with Myosure in the [facility] for further endometrial sampling. Will restart Provera at 10mg BID dosing until procedure can be performed, she does not need refills for the medication today.   2. Today I discussed with Davina the risks of her upcoming hysteroscopy with possible Myosure resection of intracavitary pathology. Risks reviewed included intraoperative bleeding, infection at the site of surgery and damage to the adjacent surrounding organs. Additionally, the small risk for reoperation in the event of unanticipated " bleeding or surgical injury was discussed.  Finally we discussed the risks of cerebral and/or pulmonary edema related to excess absorption of the distending fluid and the small chance the procedure could not be completed if there was an excessive mismatch of fluid infused & fluid recovered.  All of her questions were answered fully.  She left with a very clear understanding of the preoperative surgical indications and the nature of the surgery for which she is scheduled.  She understands to be NPO after midnight and to be at the preoperative area ~ 1 hour prior to the scheduled surgical start time.  3. The importance of keeping all planned follow-up and taking all medications as prescribed was emphasized.  4. Follow up for scheduled procedure.     No orders of the defined types were placed in this encounter.         This note was electronically signed.    Cate Diop MD   September 27, 2022

## 2022-09-28 ENCOUNTER — TELEPHONE (OUTPATIENT)
Dept: OBSTETRICS AND GYNECOLOGY | Facility: CLINIC | Age: 52
End: 2022-09-28

## 2022-09-28 NOTE — TELEPHONE ENCOUNTER
I have verified that the patient is taking progesterone twice daily.  I have created a work excuse for the patient and have emailed it to her place of employment.

## 2022-09-28 NOTE — TELEPHONE ENCOUNTER
Called attempting to reach patient; no answer, LVM requesting call back and ask for triage and provided office call back number.

## 2022-09-28 NOTE — TELEPHONE ENCOUNTER
High pt called stating she had to leave work due to heavy bleeding and wants to take the rest of the week off to stay off of her feet. Wants to know if she can get a note for today thru Friday. Please advise

## 2022-09-28 NOTE — TELEPHONE ENCOUNTER
Patient was seen in the office yesterday with post-menopausal bleeding.  It has been recommended that she have hysteroscopy, D&C, Myosure to obtain endometrial sampling.    Today the patient had to leave work due to heavy bleeding.  She states that she has gone home and is in bed.  She is asking for a work excuse that would cover today through Friday.  Is this OK?  Please advise.    I have asked the patient to get a fax number so that if we are able to provide work excuse, we can fax to her employer.

## 2022-10-06 ENCOUNTER — LAB REQUISITION (OUTPATIENT)
Dept: LAB | Facility: HOSPITAL | Age: 52
End: 2022-10-06

## 2022-10-06 ENCOUNTER — OUTSIDE FACILITY SERVICE (OUTPATIENT)
Dept: OBSTETRICS AND GYNECOLOGY | Facility: CLINIC | Age: 52
End: 2022-10-06

## 2022-10-06 DIAGNOSIS — N95.0 POSTMENOPAUSAL BLEEDING: ICD-10-CM

## 2022-10-06 PROCEDURE — 58558 HYSTEROSCOPY BIOPSY: CPT | Performed by: STUDENT IN AN ORGANIZED HEALTH CARE EDUCATION/TRAINING PROGRAM

## 2022-10-06 PROCEDURE — 88305 TISSUE EXAM BY PATHOLOGIST: CPT | Performed by: STUDENT IN AN ORGANIZED HEALTH CARE EDUCATION/TRAINING PROGRAM

## 2022-10-06 RX ORDER — IBUPROFEN 600 MG/1
600 TABLET ORAL EVERY 6 HOURS PRN
Qty: 60 TABLET | Refills: 1 | Status: SHIPPED | OUTPATIENT
Start: 2022-10-06

## 2022-10-06 RX ORDER — DOCUSATE SODIUM 100 MG/1
100 CAPSULE, LIQUID FILLED ORAL 2 TIMES DAILY
Qty: 60 CAPSULE | Refills: 1 | Status: SHIPPED | OUTPATIENT
Start: 2022-10-06

## 2022-10-09 LAB
CYTO UR: NORMAL
LAB AP CASE REPORT: NORMAL
LAB AP CLINICAL INFORMATION: NORMAL
PATH REPORT.FINAL DX SPEC: NORMAL
PATH REPORT.GROSS SPEC: NORMAL

## 2022-10-10 ENCOUNTER — TELEPHONE (OUTPATIENT)
Dept: OBSTETRICS AND GYNECOLOGY | Facility: CLINIC | Age: 52
End: 2022-10-10

## 2022-10-10 NOTE — TELEPHONE ENCOUNTER
Patient called, wanting to know if she should continue taking medroxyPROGESTERone (Provera) 10 MG tablet Please give patient a call

## 2022-10-12 NOTE — TELEPHONE ENCOUNTER
LVM asking for return call to office. Pt can be advised of message from Dr Diop below.     No unless her bleeding returns and is heavy.     Cate Diop MD

## 2022-10-17 NOTE — TELEPHONE ENCOUNTER
Called and spoke with patient, bleeding has tapered off over weekend, will be in office for post op appt.

## 2022-10-20 ENCOUNTER — OFFICE VISIT (OUTPATIENT)
Dept: OBSTETRICS AND GYNECOLOGY | Facility: CLINIC | Age: 52
End: 2022-10-20

## 2022-10-20 VITALS
RESPIRATION RATE: 14 BRPM | WEIGHT: 252.2 LBS | DIASTOLIC BLOOD PRESSURE: 102 MMHG | BODY MASS INDEX: 39.58 KG/M2 | HEIGHT: 67 IN | SYSTOLIC BLOOD PRESSURE: 160 MMHG

## 2022-10-20 DIAGNOSIS — Z98.890 STATUS POST HYSTEROSCOPY: Primary | ICD-10-CM

## 2022-10-20 PROCEDURE — 99212 OFFICE O/P EST SF 10 MIN: CPT | Performed by: STUDENT IN AN ORGANIZED HEALTH CARE EDUCATION/TRAINING PROGRAM

## 2022-10-20 NOTE — PROGRESS NOTES
"Subjective   Chief Complaint   Patient presents with   • Post-op     No complaints     Davina Delgado is a 52 y.o. year old  presenting to be seen for her post-operative visit.  Currently she reports no problems with eating, bowel movements, voiding, and pain is well controlled.  She is having slight brown discharge that has slowed down since her procedure.  She is otherwise doing well, and has no complaints.  She is no longer taking the Provera.    The results were discussed with Davina.    The following portions of the patient's history were reviewed and updated as appropriate:current medications and allergies       Objective   BP (!) 160/102 (BP Location: Right arm, Patient Position: Sitting, Cuff Size: Large Adult)   Resp 14   Ht 170.2 cm (67\")   Wt 114 kg (252 lb 3.2 oz)   LMP 2022   BMI 39.50 kg/m²     General:  well developed; well nourished  no acute distress   Abdomen: Not performed.     Final Diagnosis   ENDOMETRIUM, CURETTINGS:  Predominantly fragments of smooth muscle compatible with submucosal leiomyoma  Background inactive endometrium with focal endometrial stromal breakdown  Negative for atypia and malignancy          Assessment   1. S/P hysteroscopy with Myosure 10/6/22     Plan   1. May return to full activity with no restrictions  2. The importance of keeping all planned follow-up and taking all medications as prescribed was emphasized.  3. Follow up for annual exam in 6 months or sooner PRN.    No orders of the defined types were placed in this encounter.         This note was electronically signed.    Cate Diop MD .  2022    "

## 2022-11-29 ENCOUNTER — OFFICE VISIT (OUTPATIENT)
Dept: FAMILY MEDICINE CLINIC | Facility: CLINIC | Age: 52
End: 2022-11-29

## 2022-11-29 VITALS
DIASTOLIC BLOOD PRESSURE: 90 MMHG | TEMPERATURE: 98.4 F | HEIGHT: 67 IN | BODY MASS INDEX: 40.81 KG/M2 | WEIGHT: 260 LBS | OXYGEN SATURATION: 97 % | SYSTOLIC BLOOD PRESSURE: 140 MMHG | HEART RATE: 81 BPM

## 2022-11-29 DIAGNOSIS — I10 PRIMARY HYPERTENSION: Primary | ICD-10-CM

## 2022-11-29 DIAGNOSIS — Z23 IMMUNIZATION DUE: ICD-10-CM

## 2022-11-29 DIAGNOSIS — G62.9 NEUROPATHY: ICD-10-CM

## 2022-11-29 PROCEDURE — 90471 IMMUNIZATION ADMIN: CPT | Performed by: FAMILY MEDICINE

## 2022-11-29 PROCEDURE — 99213 OFFICE O/P EST LOW 20 MIN: CPT | Performed by: FAMILY MEDICINE

## 2022-11-29 PROCEDURE — 90686 IIV4 VACC NO PRSV 0.5 ML IM: CPT | Performed by: FAMILY MEDICINE

## 2022-11-29 NOTE — ASSESSMENT & PLAN NOTE
Patient's pain overall has improved.  She continues to have tingling and numbness in her fingers and toes.  It is gradually improving.  Patient will continue current supplements and following with nephrology for hyperparathyroidism.

## 2022-11-29 NOTE — ASSESSMENT & PLAN NOTE
Hypertension is improving with diet lifestyle modification but is still elevated.  Patient will continue to work on diet and exercise.  She will continue to monitor blood pressure at home.  She would like to avoid going on medication at this time.  Blood pressure will be reassessed in 3 months.

## 2022-11-29 NOTE — PROGRESS NOTES
Davina Delgado is a 52 y.o. female who presents today for Hypertension      Patient has been checking her blood pressure at home. She sees numbers in the 130-140s/80-90. She has been exercising and working on her diet. She has gained weight over the holiday. She states she no longer has pain but continues to have tingling and numbness in her fingers and toes but it has continued to improve.       Review of Systems   Constitutional: Negative for fever and unexpected weight loss.   HENT: Negative for congestion, ear pain and sore throat.    Eyes: Negative for visual disturbance.   Respiratory: Negative for cough, shortness of breath and wheezing.    Cardiovascular: Negative for chest pain and palpitations.   Gastrointestinal: Negative for abdominal pain, blood in stool, constipation, diarrhea, nausea, vomiting and GERD.   Endocrine: Negative for polydipsia and polyuria.   Genitourinary: Negative for difficulty urinating.   Musculoskeletal: Positive for arthralgias. Negative for joint swelling.   Skin: Negative for rash and skin lesions.   Allergic/Immunologic: Negative for environmental allergies.   Neurological: Positive for numbness. Negative for seizures and syncope.   Hematological: Does not bruise/bleed easily.   Psychiatric/Behavioral: Negative for suicidal ideas.        The following portions of the patient's history were reviewed and updated as appropriate: allergies, current medications, past family history, past medical history, past social history, past surgical history and problem list.    Current Outpatient Medications on File Prior to Visit   Medication Sig Dispense Refill   • Blood Pressure Monitoring (Blood Pressure Kit) device 1 kit 2 (Two) Times a Day. 1 each 0   • calcium carbonate (OS-VAMSI) 600 MG tablet Take 600 mg by mouth Daily.     • docusate sodium (Colace) 100 MG capsule Take 1 capsule by mouth 2 (Two) Times a Day. 60 capsule 1   • folic acid (FOLVITE) 1 MG tablet Take 1 mg by mouth Daily.  "    • ibuprofen (ADVIL,MOTRIN) 600 MG tablet Take 1 tablet by mouth Every 6 (Six) Hours As Needed for Mild Pain. 60 tablet 1   • medroxyPROGESTERone (Provera) 10 MG tablet Take 1 tablet by mouth 2 (Two) Times a Day. BID for 7 days then back down to daily use afterwards 60 tablet 3   • multivitamin (THERAGRAN) tablet tablet Take 1 tablet by mouth Daily. OTC     • thiamine (VITAMIN B-1) 100 MG tablet  tablet Take 100 mg by mouth Daily.     • vitamin B-6 (PYRIDOXINE) 50 MG tablet Take 50 mg by mouth Daily.     • vitamin D (ERGOCALCIFEROL) 1.25 MG (68835 UT) capsule capsule Take 50,000 Units by mouth 1 (One) Time Per Week.       No current facility-administered medications on file prior to visit.       Allergies   Allergen Reactions   • Lisinopril Other (See Comments)     Tingling in hands and feet        Visit Vitals  /90   Pulse 81   Temp 98.4 °F (36.9 °C)   Ht 170.2 cm (67\")   Wt 118 kg (260 lb)   SpO2 97%   BMI 40.72 kg/m²        Physical Exam  Constitutional:       General: She is not in acute distress.     Appearance: She is well-developed. She is not diaphoretic.   HENT:      Head: Atraumatic.   Cardiovascular:      Rate and Rhythm: Normal rate and regular rhythm.      Heart sounds: Normal heart sounds. No murmur heard.    No friction rub. No gallop.   Pulmonary:      Effort: Pulmonary effort is normal. No respiratory distress.      Breath sounds: Normal breath sounds. No stridor. No wheezing, rhonchi or rales.   Musculoskeletal:      Cervical back: Normal range of motion and neck supple.   Skin:     General: Skin is warm and dry.   Neurological:      Mental Status: She is alert and oriented to person, place, and time.   Psychiatric:         Behavior: Behavior normal.               Problems Addressed this Visit        Cardiac and Vasculature    HTN (hypertension) - Primary     Hypertension is improving with diet lifestyle modification but is still elevated.  Patient will continue to work on diet and " exercise.  She will continue to monitor blood pressure at home.  She would like to avoid going on medication at this time.  Blood pressure will be reassessed in 3 months.            Neuro    Neuropathy     Patient's pain overall has improved.  She continues to have tingling and numbness in her fingers and toes.  It is gradually improving.  Patient will continue current supplements and following with nephrology for hyperparathyroidism.        Other Visit Diagnoses     Immunization due        Relevant Orders    FluLaval/Fluzone >6 mos ([id number])      Diagnoses       Codes Comments    Primary hypertension    -  Primary ICD-10-CM: I10  ICD-9-CM: 401.9     Immunization due     ICD-10-CM: Z23  ICD-9-CM: V05.9     Neuropathy     ICD-10-CM: G62.9  ICD-9-CM: 355.9           Return in about 3 months (around 2/28/2023) for Annual.    Nils Spence MD   11/29/2022

## (undated) DEVICE — DISPOSABLE BIPOLAR FORCEPS 4" (10.2CM) JEWELERS, STRAIGHT 0.4MM TIP AND 12 FT. (3.6M) CABLE: Brand: KIRWAN

## (undated) DEVICE — PK MINOR SPLT 10

## (undated) DEVICE — HDRST PAD EA/20PC

## (undated) DEVICE — TOWEL,OR,DSP,ST,BLUE,STD,4/PK,20PK/CS: Brand: MEDLINE

## (undated) DEVICE — APPL CHLORAPREP 26ML CLR

## (undated) DEVICE — HARMONIC FOCUS SHEARS 9CM LENGTH + ADAPTIVE TISSUE TECHNOLOGY FOR USE WITH BLUE HAND PIECE ONLY: Brand: HARMONIC FOCUS

## (undated) DEVICE — TRAP FLD MINIVAC MEGADYNE 100ML

## (undated) DEVICE — GAUZE,SPONGE,4"X4",16PLY,XRAY,STRL,LF: Brand: MEDLINE

## (undated) DEVICE — ANTIBACTERIAL UNDYED BRAIDED (POLYGLACTIN 910), SYNTHETIC ABSORBABLE SUTURE: Brand: COATED VICRYL

## (undated) DEVICE — ELECTRD BLD EZ CLN MOD XLNG 2.75IN

## (undated) DEVICE — UNDERGLV SURG BIOGEL INDICATOR LF PF 7.5

## (undated) DEVICE — GLV SURG BIOGEL LTX PF 7

## (undated) DEVICE — Device

## (undated) DEVICE — PATIENT RETURN ELECTRODE, SINGLE-USE, CONTACT QUALITY MONITORING, ADULT, WITH 9FT CORD, FOR PATIENTS WEIGING OVER 33LBS. (15KG): Brand: MEGADYNE

## (undated) DEVICE — MARKER,SKIN,W/RULER,DUAL,STOP: Brand: MEDLINE

## (undated) DEVICE — ELECTRODE 8227410 PAIRED 2 CH SET ROHS

## (undated) DEVICE — TBG PENCL TELESCP MEGADYNE SMOKE EVAC 10FT

## (undated) DEVICE — PROBE 8225825 3PK INCREMT STD PRASS ROHS

## (undated) DEVICE — SUT SILK 3/0 TIES 18IN A184H

## (undated) DEVICE — SUT SILK 4/0 TIES 18IN A183H

## (undated) DEVICE — ST BLD COL SAFETYLOK LL 23GA 3/4IN 12IN

## (undated) DEVICE — SUT MNCRYL PLS ANTIB UD 4/0 PS2 18IN

## (undated) DEVICE — DISH PETRI 3.5IN MD STRL LF

## (undated) DEVICE — DRSNG SURESITE WNDW 4X4.5

## (undated) DEVICE — INTENDED USE FOR SURGICAL MARKING ON INTACT SKIN, ALSO PROVIDES A PERMANENT METHOD OF IDENTIFYING OBJECTS IN THE OPERATING ROOM: Brand: WRITESITE® REGULAR TIP SKIN MARKER